# Patient Record
Sex: FEMALE | Race: WHITE | Employment: OTHER | URBAN - METROPOLITAN AREA
[De-identification: names, ages, dates, MRNs, and addresses within clinical notes are randomized per-mention and may not be internally consistent; named-entity substitution may affect disease eponyms.]

---

## 2017-03-23 ENCOUNTER — TELEPHONE (OUTPATIENT)
Dept: INTERNAL MEDICINE CLINIC | Facility: CLINIC | Age: 77
End: 2017-03-23

## 2017-03-23 NOTE — TELEPHONE ENCOUNTER
Actions Requested:   Requesting appointment. Situation/Background   Problem:  Sluggish and weak   Onset:   On and off all winter long. Worse since yesterday   Associated Symptoms:  Patient has not been able to sleep .   She wakes up in the morning sluggi

## 2017-03-23 NOTE — TELEPHONE ENCOUNTER
Pt states that her blood pressure is high. Per pt she is not feeling well. Transferred call to [employer].

## 2017-03-24 ENCOUNTER — OFFICE VISIT (OUTPATIENT)
Dept: INTERNAL MEDICINE CLINIC | Facility: CLINIC | Age: 77
End: 2017-03-24

## 2017-03-24 VITALS
HEART RATE: 65 BPM | BODY MASS INDEX: 24.05 KG/M2 | HEIGHT: 61 IN | SYSTOLIC BLOOD PRESSURE: 164 MMHG | DIASTOLIC BLOOD PRESSURE: 76 MMHG | RESPIRATION RATE: 18 BRPM | WEIGHT: 127.38 LBS

## 2017-03-24 DIAGNOSIS — R07.9 CHEST PAIN, UNSPECIFIED TYPE: ICD-10-CM

## 2017-03-24 DIAGNOSIS — G47.00 INSOMNIA, UNSPECIFIED TYPE: ICD-10-CM

## 2017-03-24 DIAGNOSIS — R53.83 MALAISE AND FATIGUE: ICD-10-CM

## 2017-03-24 DIAGNOSIS — I10 ESSENTIAL HYPERTENSION: Primary | ICD-10-CM

## 2017-03-24 DIAGNOSIS — R53.81 MALAISE AND FATIGUE: ICD-10-CM

## 2017-03-24 PROCEDURE — 99213 OFFICE O/P EST LOW 20 MIN: CPT | Performed by: INTERNAL MEDICINE

## 2017-03-24 PROCEDURE — G0463 HOSPITAL OUTPT CLINIC VISIT: HCPCS | Performed by: INTERNAL MEDICINE

## 2017-03-24 RX ORDER — LISINOPRIL AND HYDROCHLOROTHIAZIDE 25; 20 MG/1; MG/1
1 TABLET ORAL DAILY
Qty: 100 TABLET | Refills: 3 | Status: SHIPPED | OUTPATIENT
Start: 2017-03-24 | End: 2017-09-18

## 2017-03-24 RX ORDER — MIRTAZAPINE 7.5 MG/1
7.5 TABLET, FILM COATED ORAL NIGHTLY
Qty: 90 TABLET | Refills: 2 | Status: SHIPPED | OUTPATIENT
Start: 2017-03-24 | End: 2017-04-26

## 2017-03-24 NOTE — PROGRESS NOTES
Old aisha pt  Had stress echo nl 5 y ago  Has htn  On zetril 40mg,norvasc 5 lopressor 50mg  Bps high at home   03/24/17  1117   BP: 164/76   Pulse: 65   Resp: 18   Height: 5' 1\" (1.549 m)   Weight: 127 lb 6.4 oz (57.788 kg)         Body mass index is 24.08

## 2017-03-25 ENCOUNTER — TELEPHONE (OUTPATIENT)
Dept: INTERNAL MEDICINE CLINIC | Facility: CLINIC | Age: 77
End: 2017-03-25

## 2017-03-25 NOTE — TELEPHONE ENCOUNTER
Pharmacy called in stating they received an rx for Lisinopril-Hydrochlorothiazide 20-25, but have noted on file that pt has always taken lisinopril 40 mg?   Pharmacy wanting to clarify if pt should still be taking the lisinopril 40 mg along with this new rx

## 2017-03-25 NOTE — TELEPHONE ENCOUNTER
Per RF OV note from 3/24/17:    4. Chest pain, unspecified type    - Lipid Panel [E];  Future  - EKG 12 Lead to be performed at [facility]; Future  Change zestril to zestoretic 20/25 one/d        Pharmacy contacted and spoke to Camilla [place]

## 2017-03-27 ENCOUNTER — LAB ENCOUNTER (OUTPATIENT)
Dept: LAB | Age: 77
End: 2017-03-27
Attending: INTERNAL MEDICINE
Payer: MEDICARE

## 2017-03-27 ENCOUNTER — APPOINTMENT (OUTPATIENT)
Dept: LAB | Age: 77
End: 2017-03-27
Attending: INTERNAL MEDICINE
Payer: MEDICARE

## 2017-03-27 DIAGNOSIS — I10 ESSENTIAL HYPERTENSION: ICD-10-CM

## 2017-03-27 DIAGNOSIS — R07.9 CHEST PAIN, UNSPECIFIED TYPE: ICD-10-CM

## 2017-03-27 DIAGNOSIS — R53.83 MALAISE AND FATIGUE: ICD-10-CM

## 2017-03-27 DIAGNOSIS — R53.81 MALAISE AND FATIGUE: ICD-10-CM

## 2017-03-27 LAB
ALBUMIN SERPL BCP-MCNC: 3.7 G/DL (ref 3.5–4.8)
ALBUMIN/GLOB SERPL: 1.3 {RATIO} (ref 1–2)
ALP SERPL-CCNC: 63 U/L (ref 32–100)
ALT SERPL-CCNC: 22 U/L (ref 14–54)
ANION GAP SERPL CALC-SCNC: 9 MMOL/L (ref 0–18)
AST SERPL-CCNC: 21 U/L (ref 15–41)
BASOPHILS # BLD: 0.1 K/UL (ref 0–0.2)
BASOPHILS NFR BLD: 1 %
BILIRUB SERPL-MCNC: 0.8 MG/DL (ref 0.3–1.2)
BUN SERPL-MCNC: 11 MG/DL (ref 8–20)
BUN/CREAT SERPL: 12.1 (ref 10–20)
CALCIUM SERPL-MCNC: 9.3 MG/DL (ref 8.5–10.5)
CHLORIDE SERPL-SCNC: 103 MMOL/L (ref 95–110)
CHOLEST SERPL-MCNC: 178 MG/DL (ref 110–200)
CO2 SERPL-SCNC: 28 MMOL/L (ref 22–32)
CREAT SERPL-MCNC: 0.91 MG/DL (ref 0.5–1.5)
EOSINOPHIL # BLD: 0.3 K/UL (ref 0–0.7)
EOSINOPHIL NFR BLD: 5 %
ERYTHROCYTE [DISTWIDTH] IN BLOOD BY AUTOMATED COUNT: 14.2 % (ref 11–15)
GLOBULIN PLAS-MCNC: 2.9 G/DL (ref 2.5–3.7)
GLUCOSE SERPL-MCNC: 94 MG/DL (ref 70–99)
HCT VFR BLD AUTO: 39.7 % (ref 35–48)
HDLC SERPL-MCNC: 52 MG/DL
HGB BLD-MCNC: 13 G/DL (ref 12–16)
LDLC SERPL CALC-MCNC: 108 MG/DL (ref 0–99)
LYMPHOCYTES # BLD: 2.4 K/UL (ref 1–4)
LYMPHOCYTES NFR BLD: 37 %
MCH RBC QN AUTO: 28.3 PG (ref 27–32)
MCHC RBC AUTO-ENTMCNC: 32.7 G/DL (ref 32–37)
MCV RBC AUTO: 86.7 FL (ref 80–100)
MONOCYTES # BLD: 0.4 K/UL (ref 0–1)
MONOCYTES NFR BLD: 6 %
NEUTROPHILS # BLD AUTO: 3.3 K/UL (ref 1.8–7.7)
NEUTROPHILS NFR BLD: 51 %
NONHDLC SERPL-MCNC: 126 MG/DL
OSMOLALITY UR CALC.SUM OF ELEC: 289 MOSM/KG (ref 275–295)
PLATELET # BLD AUTO: 238 K/UL (ref 140–400)
PMV BLD AUTO: 9 FL (ref 7.4–10.3)
POTASSIUM SERPL-SCNC: 3.7 MMOL/L (ref 3.3–5.1)
PROT SERPL-MCNC: 6.6 G/DL (ref 5.9–8.4)
RBC # BLD AUTO: 4.58 M/UL (ref 3.7–5.4)
SODIUM SERPL-SCNC: 140 MMOL/L (ref 136–144)
TRIGL SERPL-MCNC: 88 MG/DL (ref 1–149)
TSH SERPL-ACNC: 1.7 UIU/ML (ref 0.34–5.6)
WBC # BLD AUTO: 6.4 K/UL (ref 4–11)

## 2017-03-27 PROCEDURE — 93005 ELECTROCARDIOGRAM TRACING: CPT

## 2017-03-27 PROCEDURE — 85025 COMPLETE CBC W/AUTO DIFF WBC: CPT

## 2017-03-27 PROCEDURE — 84443 ASSAY THYROID STIM HORMONE: CPT

## 2017-03-27 PROCEDURE — 93010 ELECTROCARDIOGRAM REPORT: CPT

## 2017-03-27 PROCEDURE — 36415 COLL VENOUS BLD VENIPUNCTURE: CPT

## 2017-03-27 PROCEDURE — 80053 COMPREHEN METABOLIC PANEL: CPT

## 2017-03-27 PROCEDURE — 80061 LIPID PANEL: CPT

## 2017-03-28 ENCOUNTER — TELEPHONE (OUTPATIENT)
Dept: INTERNAL MEDICINE CLINIC | Facility: CLINIC | Age: 77
End: 2017-03-28

## 2017-03-28 NOTE — TELEPHONE ENCOUNTER
----- Message from Corrine May MD sent at 3/27/2017  3:39 PM CDT -----  Call patient tell them results were normal

## 2017-03-28 NOTE — TELEPHONE ENCOUNTER
Spoke with patient (identified name and ), results reviewed and agrees with plan. Patient was very happy.

## 2017-04-19 ENCOUNTER — OFFICE VISIT (OUTPATIENT)
Dept: DERMATOLOGY CLINIC | Facility: CLINIC | Age: 77
End: 2017-04-19

## 2017-04-19 DIAGNOSIS — D23.5 BENIGN NEOPLASM OF SKIN OF TRUNK, EXCEPT SCROTUM: ICD-10-CM

## 2017-04-19 DIAGNOSIS — D48.5 NEOPLASM OF UNCERTAIN BEHAVIOR OF SKIN: Primary | ICD-10-CM

## 2017-04-19 DIAGNOSIS — D23.4 BENIGN NEOPLASM OF SCALP AND SKIN OF NECK: ICD-10-CM

## 2017-04-19 DIAGNOSIS — D23.70 BENIGN NEOPLASM OF SKIN OF LOWER LIMB, INCLUDING HIP, UNSPECIFIED LATERALITY: ICD-10-CM

## 2017-04-19 DIAGNOSIS — L82.1 SEBORRHEIC KERATOSES: ICD-10-CM

## 2017-04-19 DIAGNOSIS — D23.60 BENIGN NEOPLASM OF SKIN OF UPPER LIMB, INCLUDING SHOULDER, UNSPECIFIED LATERALITY: ICD-10-CM

## 2017-04-19 PROCEDURE — 11100 BIOPSY OF SKIN LESION: CPT | Performed by: DERMATOLOGY

## 2017-04-19 PROCEDURE — 99202 OFFICE O/P NEW SF 15 MIN: CPT | Performed by: DERMATOLOGY

## 2017-04-19 PROCEDURE — 11101 BIOPSY, EACH ADDED LESION: CPT | Performed by: DERMATOLOGY

## 2017-04-19 PROCEDURE — 88305 TISSUE EXAM BY PATHOLOGIST: CPT | Performed by: DERMATOLOGY

## 2017-04-19 RX ORDER — AMMONIUM LACTATE 12 G/100G
1 LOTION TOPICAL 2 TIMES DAILY
Qty: 500 G | Refills: 11 | Status: SHIPPED | OUTPATIENT
Start: 2017-04-19 | End: 2017-05-19

## 2017-04-19 NOTE — PROGRESS NOTES
Past Medical History   Diagnosis Date   • Lipid screening 6/25/14   • Onychia of toe of left foot 2009     Left great toe/Depbridement of at least 2/3 toenail   • Hypertension          Past Surgical History    APPENDECTOMY         Social History   Marital

## 2017-04-26 ENCOUNTER — OFFICE VISIT (OUTPATIENT)
Dept: INTERNAL MEDICINE CLINIC | Facility: CLINIC | Age: 77
End: 2017-04-26

## 2017-04-26 VITALS
WEIGHT: 129 LBS | BODY MASS INDEX: 24.35 KG/M2 | SYSTOLIC BLOOD PRESSURE: 125 MMHG | HEIGHT: 61 IN | HEART RATE: 56 BPM | DIASTOLIC BLOOD PRESSURE: 71 MMHG

## 2017-04-26 DIAGNOSIS — G47.00 INSOMNIA, UNSPECIFIED TYPE: ICD-10-CM

## 2017-04-26 DIAGNOSIS — R53.83 MALAISE AND FATIGUE: ICD-10-CM

## 2017-04-26 DIAGNOSIS — J34.89 NASAL LESION: ICD-10-CM

## 2017-04-26 DIAGNOSIS — I10 ESSENTIAL HYPERTENSION: Primary | ICD-10-CM

## 2017-04-26 DIAGNOSIS — R53.81 MALAISE AND FATIGUE: ICD-10-CM

## 2017-04-26 PROCEDURE — 99213 OFFICE O/P EST LOW 20 MIN: CPT | Performed by: INTERNAL MEDICINE

## 2017-04-26 PROCEDURE — G0463 HOSPITAL OUTPT CLINIC VISIT: HCPCS | Performed by: INTERNAL MEDICINE

## 2017-04-26 RX ORDER — ALPRAZOLAM 0.25 MG/1
TABLET ORAL
Qty: 30 TABLET | Refills: 1 | Status: SHIPPED | OUTPATIENT
Start: 2017-04-26 | End: 2020-12-14

## 2017-04-26 NOTE — PROGRESS NOTES
Still tired didn't get mirtazepinr dueto price  Had nasal lesion removed  beging  Results given to pt    Blood pressure 125/71, pulse 56, height 5' 1\" (1.549 m), weight 129 lb (58.514 kg).     HEENT-NC/AT PEERLA, eom intact, sclerae non icteric, oral exam

## 2017-04-26 NOTE — PROGRESS NOTES
Quick Note:    The pathology report from last visit showed benign inflamed keratosis at nose, Left cheek , sk . Please log in test results, send biopsy results letter.  Pt to rtc 1 year or prn.  ______

## 2017-05-08 NOTE — PROGRESS NOTES
Betito Alvarenga is a 68year old female. HPI:     CC:  Patient presents with:  Derm Problem: new patient. Pt here with concerns of multiple lesions all over body and is requesting a full body skin exam.  Pt denies personal and family hx of skin cancer. least 2/3 toenail   • Hypertension          Past Surgical History    APPENDECTOMY         Social History   Marital Status:    Spouse Name: N/A    Years of Education: N/A  Number of Children: N/A     Occupational History  None on file     Social Histo keratotic papules scattered, cherry-red vascular papules scattered. See map today's date for lesions noted . 10 m crusted plaque over nasal dorsum.   Erythematous scaling keratotic papules left upper lip    Dark brown black papule left lower cheek    Ot carolyn

## 2017-06-20 RX ORDER — METOPROLOL TARTRATE 50 MG/1
TABLET, FILM COATED ORAL
Qty: 90 TABLET | Refills: 0 | Status: SHIPPED | OUTPATIENT
Start: 2017-06-20 | End: 2017-09-15

## 2017-06-20 RX ORDER — AMLODIPINE BESYLATE 5 MG/1
TABLET ORAL
Qty: 90 TABLET | Refills: 0 | Status: SHIPPED | OUTPATIENT
Start: 2017-06-20 | End: 2017-09-15

## 2017-09-12 RX ORDER — METOPROLOL TARTRATE 50 MG/1
TABLET, FILM COATED ORAL
Qty: 90 TABLET | Refills: 0 | OUTPATIENT
Start: 2017-09-12

## 2017-09-12 RX ORDER — AMLODIPINE BESYLATE 5 MG/1
TABLET ORAL
Qty: 90 TABLET | Refills: 0 | OUTPATIENT
Start: 2017-09-12

## 2017-09-15 NOTE — TELEPHONE ENCOUNTER
Patient is calling regarding the status of the refills. Current Outpatient Prescriptions:  Lisinopril-Hydrochlorothiazide (ZESTORETIC) 20-25 MG Oral Tab Take 1 tablet by mouth daily.  Disp: 100 tablet Rfl: 3

## 2017-09-18 RX ORDER — LISINOPRIL AND HYDROCHLOROTHIAZIDE 25; 20 MG/1; MG/1
1 TABLET ORAL DAILY
Qty: 90 TABLET | Refills: 0 | Status: SHIPPED | OUTPATIENT
Start: 2017-09-18 | End: 2017-12-07

## 2017-09-18 RX ORDER — METOPROLOL TARTRATE 50 MG/1
TABLET, FILM COATED ORAL
Qty: 90 TABLET | Refills: 0 | Status: SHIPPED | OUTPATIENT
Start: 2017-09-18 | End: 2017-12-07

## 2017-09-18 RX ORDER — AMLODIPINE BESYLATE 5 MG/1
TABLET ORAL
Qty: 90 TABLET | Refills: 0 | Status: SHIPPED | OUTPATIENT
Start: 2017-09-18 | End: 2017-12-07

## 2017-09-18 NOTE — TELEPHONE ENCOUNTER
Patient contacted, will make f/u appt with Dr Kierra Martinez in Dec 2017  Medication refilled for 90 days per protocol.     Hypertensive Medications  Protocol Criteria:  · Appointment scheduled in the past 6 months or in the next 3 months  · BMP or CMP in the

## 2017-12-06 ENCOUNTER — TELEPHONE (OUTPATIENT)
Dept: INTERNAL MEDICINE CLINIC | Facility: CLINIC | Age: 77
End: 2017-12-06

## 2017-12-07 ENCOUNTER — OFFICE VISIT (OUTPATIENT)
Dept: INTERNAL MEDICINE CLINIC | Facility: CLINIC | Age: 77
End: 2017-12-07

## 2017-12-07 VITALS
HEART RATE: 66 BPM | DIASTOLIC BLOOD PRESSURE: 73 MMHG | HEIGHT: 61 IN | WEIGHT: 129 LBS | SYSTOLIC BLOOD PRESSURE: 133 MMHG | BODY MASS INDEX: 24.35 KG/M2 | TEMPERATURE: 97 F | RESPIRATION RATE: 18 BRPM

## 2017-12-07 DIAGNOSIS — E53.8 VITAMIN B12 DEFICIENCY: ICD-10-CM

## 2017-12-07 DIAGNOSIS — I10 ESSENTIAL HYPERTENSION: Primary | ICD-10-CM

## 2017-12-07 DIAGNOSIS — G25.81 RESTLESS LEGS: ICD-10-CM

## 2017-12-07 DIAGNOSIS — D64.9 ANEMIA, UNSPECIFIED TYPE: ICD-10-CM

## 2017-12-07 PROCEDURE — 99214 OFFICE O/P EST MOD 30 MIN: CPT | Performed by: INTERNAL MEDICINE

## 2017-12-07 PROCEDURE — G0463 HOSPITAL OUTPT CLINIC VISIT: HCPCS | Performed by: INTERNAL MEDICINE

## 2017-12-07 RX ORDER — METOPROLOL TARTRATE 50 MG/1
TABLET, FILM COATED ORAL
Qty: 90 TABLET | Refills: 1 | Status: SHIPPED | OUTPATIENT
Start: 2017-12-07 | End: 2018-06-11

## 2017-12-07 RX ORDER — AMLODIPINE BESYLATE 5 MG/1
TABLET ORAL
Qty: 90 TABLET | Refills: 1 | Status: SHIPPED | OUTPATIENT
Start: 2017-12-07 | End: 2018-06-11

## 2017-12-07 RX ORDER — LISINOPRIL AND HYDROCHLOROTHIAZIDE 25; 20 MG/1; MG/1
1 TABLET ORAL DAILY
Qty: 90 TABLET | Refills: 1 | Status: SHIPPED | OUTPATIENT
Start: 2017-12-07 | End: 2018-06-11

## 2017-12-07 NOTE — PROGRESS NOTES
HPI:    Patient ID: Henry Ragland is a 68year old female. Hypertension   This is a chronic (pt  state  feels   good  and  need  refills   ,  no  complains   except   sometimes restless legs ) problem. The current episode started more than 1 year ago.  The mouth daily.  Disp: 90 tablet Rfl: 1   AmLODIPine Besylate 5 MG Oral Tab TAKE ONE TABLET BY MOUTH ONCE DAILY Disp: 90 tablet Rfl: 1   ALPRAZolam 0.25 MG Oral Tab One at night as needed for sleep Disp: 30 tablet Rfl: 1   aspirin 81 MG Oral Tab Take 81 mg by temperature source Oral, resp. rate 18, height 5' 1\" (1.549 m), weight 129 lb (58.5 kg).            ASSESSMENT/PLAN:   Essential hypertension  (primary encounter diagnosis)  Take high blood pressure medication as perscribed   Low- sodium diet (2grams per d

## 2018-06-11 DIAGNOSIS — I10 ESSENTIAL HYPERTENSION: ICD-10-CM

## 2018-06-11 NOTE — TELEPHONE ENCOUNTER
Pending Prescriptions Disp Refills    Metoprolol Tartrate 50 MG Oral Tab 90 tablet 1     Sig: TAKE ONE TABLET BY MOUTH ONCE DAILY      Lisinopril-Hydrochlorothiazide (ZESTORETIC) 20-25 MG Oral Tab 90 tablet 1     Sig: Take 1 tablet by mouth daily.       A 03/27/2017   GLOBULIN 2.9 03/27/2017   AGRATIO 1.4 06/25/2015   ANIONGAP 9 03/27/2017   OSMOCALC 289 03/27/2017

## 2018-06-11 NOTE — TELEPHONE ENCOUNTER
Current Outpatient Prescriptions:   •  Metoprolol Tartrate 50 MG Oral Tab, TAKE ONE TABLET BY MOUTH ONCE DAILY, Disp: 90 tablet, Rfl: 1  •  Lisinopril-Hydrochlorothiazide (ZESTORETIC) 20-25 MG Oral Tab, Take 1 tablet by mouth daily. , Disp: 90 tablet, Rfl

## 2018-06-12 RX ORDER — LISINOPRIL AND HYDROCHLOROTHIAZIDE 25; 20 MG/1; MG/1
1 TABLET ORAL DAILY
Qty: 30 TABLET | Refills: 0 | Status: SHIPPED | OUTPATIENT
Start: 2018-06-12 | End: 2018-07-26

## 2018-06-12 RX ORDER — METOPROLOL TARTRATE 50 MG/1
TABLET, FILM COATED ORAL
Qty: 90 TABLET | Refills: 0 | Status: SHIPPED | OUTPATIENT
Start: 2018-06-12 | End: 2018-07-26

## 2018-06-12 RX ORDER — AMLODIPINE BESYLATE 5 MG/1
TABLET ORAL
Qty: 90 TABLET | Refills: 0 | Status: SHIPPED | OUTPATIENT
Start: 2018-06-12 | End: 2018-07-26

## 2018-07-26 ENCOUNTER — OFFICE VISIT (OUTPATIENT)
Dept: INTERNAL MEDICINE CLINIC | Facility: CLINIC | Age: 78
End: 2018-07-26
Payer: MEDICARE

## 2018-07-26 VITALS
DIASTOLIC BLOOD PRESSURE: 68 MMHG | HEART RATE: 61 BPM | SYSTOLIC BLOOD PRESSURE: 122 MMHG | WEIGHT: 123.81 LBS | RESPIRATION RATE: 18 BRPM | TEMPERATURE: 98 F | BODY MASS INDEX: 23.38 KG/M2 | HEIGHT: 61 IN

## 2018-07-26 DIAGNOSIS — Z23 NEED FOR VACCINATION: ICD-10-CM

## 2018-07-26 DIAGNOSIS — I10 ESSENTIAL HYPERTENSION: ICD-10-CM

## 2018-07-26 DIAGNOSIS — G25.81 RESTLESS LEGS SYNDROME: Primary | ICD-10-CM

## 2018-07-26 DIAGNOSIS — N81.10 BLADDER PROLAPSE, FEMALE, ACQUIRED: ICD-10-CM

## 2018-07-26 PROCEDURE — 99214 OFFICE O/P EST MOD 30 MIN: CPT | Performed by: INTERNAL MEDICINE

## 2018-07-26 PROCEDURE — G0463 HOSPITAL OUTPT CLINIC VISIT: HCPCS | Performed by: INTERNAL MEDICINE

## 2018-07-26 PROCEDURE — 90670 PCV13 VACCINE IM: CPT | Performed by: INTERNAL MEDICINE

## 2018-07-26 PROCEDURE — G0009 ADMIN PNEUMOCOCCAL VACCINE: HCPCS | Performed by: INTERNAL MEDICINE

## 2018-07-26 RX ORDER — METOPROLOL TARTRATE 50 MG/1
TABLET, FILM COATED ORAL
Qty: 90 TABLET | Refills: 1 | Status: SHIPPED | OUTPATIENT
Start: 2018-07-26 | End: 2019-01-28 | Stop reason: DRUGHIGH

## 2018-07-26 RX ORDER — AMLODIPINE BESYLATE 5 MG/1
TABLET ORAL
Qty: 90 TABLET | Refills: 1 | Status: SHIPPED | OUTPATIENT
Start: 2018-07-26 | End: 2019-01-07

## 2018-07-26 RX ORDER — LISINOPRIL AND HYDROCHLOROTHIAZIDE 25; 20 MG/1; MG/1
1 TABLET ORAL DAILY
Qty: 90 TABLET | Refills: 1 | Status: SHIPPED | OUTPATIENT
Start: 2018-07-26 | End: 2019-01-07

## 2018-07-26 NOTE — PROGRESS NOTES
HPI:    Patient ID: Federica Fine is a 66year old female. Patient in office today for follow up  bp check visit and exam. States feeling well. Denies chest pain, shortnesss of breath, palpitations, or abdominal pain, denies Uti symptoms fever or chills.   Dandy Samples nervous/anxious. Current Outpatient Prescriptions:  Metoprolol Tartrate 50 MG Oral Tab TAKE ONE TABLET BY MOUTH ONCE DAILY Disp: 90 tablet Rfl: 1   Lisinopril-Hydrochlorothiazide (ZESTORETIC) 20-25 MG Oral Tab Take 1 tablet by mouth daily.  Vinny Montague time.   Skin: Skin is warm and dry. Psychiatric: She has a normal mood and affect. Her behavior is normal.   Nursing note and vitals reviewed. Blood pressure 122/68, pulse 61, temperature 97.9 °F (36.6 °C), temperature source Oral, resp.  rate 18, heig

## 2018-07-27 ENCOUNTER — LAB ENCOUNTER (OUTPATIENT)
Dept: LAB | Age: 78
End: 2018-07-27
Attending: INTERNAL MEDICINE
Payer: MEDICARE

## 2018-07-27 DIAGNOSIS — I10 ESSENTIAL HYPERTENSION: ICD-10-CM

## 2018-07-27 DIAGNOSIS — G25.81 RESTLESS LEGS: ICD-10-CM

## 2018-07-27 DIAGNOSIS — E53.8 VITAMIN B12 DEFICIENCY: ICD-10-CM

## 2018-07-27 DIAGNOSIS — D64.9 ANEMIA, UNSPECIFIED TYPE: ICD-10-CM

## 2018-07-27 LAB
ALBUMIN SERPL BCP-MCNC: 3.6 G/DL (ref 3.5–4.8)
ALBUMIN/GLOB SERPL: 1.3 {RATIO} (ref 1–2)
ALP SERPL-CCNC: 61 U/L (ref 32–100)
ALT SERPL-CCNC: 22 U/L (ref 14–54)
ANION GAP SERPL CALC-SCNC: 10 MMOL/L (ref 0–18)
AST SERPL-CCNC: 25 U/L (ref 15–41)
BASOPHILS # BLD: 0.1 K/UL (ref 0–0.2)
BASOPHILS NFR BLD: 1 %
BILIRUB SERPL-MCNC: 0.7 MG/DL (ref 0.3–1.2)
BUN SERPL-MCNC: 28 MG/DL (ref 8–20)
BUN/CREAT SERPL: 28.6 (ref 10–20)
CALCIUM SERPL-MCNC: 9.1 MG/DL (ref 8.5–10.5)
CHLORIDE SERPL-SCNC: 103 MMOL/L (ref 95–110)
CHOLEST SERPL-MCNC: 162 MG/DL (ref 110–200)
CO2 SERPL-SCNC: 26 MMOL/L (ref 22–32)
CREAT SERPL-MCNC: 0.98 MG/DL (ref 0.5–1.5)
EOSINOPHIL # BLD: 0.6 K/UL (ref 0–0.7)
EOSINOPHIL NFR BLD: 8 %
ERYTHROCYTE [DISTWIDTH] IN BLOOD BY AUTOMATED COUNT: 13.7 % (ref 11–15)
FERRITIN SERPL IA-MCNC: 63 NG/ML (ref 11–307)
GLOBULIN PLAS-MCNC: 2.7 G/DL (ref 2.5–3.7)
GLUCOSE SERPL-MCNC: 88 MG/DL (ref 70–99)
HCT VFR BLD AUTO: 34.5 % (ref 35–48)
HDLC SERPL-MCNC: 51 MG/DL
HGB BLD-MCNC: 11.5 G/DL (ref 12–16)
LDLC SERPL CALC-MCNC: 99 MG/DL (ref 0–99)
LYMPHOCYTES # BLD: 2.5 K/UL (ref 1–4)
LYMPHOCYTES NFR BLD: 33 %
MCH RBC QN AUTO: 29.1 PG (ref 27–32)
MCHC RBC AUTO-ENTMCNC: 33.5 G/DL (ref 32–37)
MCV RBC AUTO: 87 FL (ref 80–100)
MONOCYTES # BLD: 0.6 K/UL (ref 0–1)
MONOCYTES NFR BLD: 8 %
NEUTROPHILS # BLD AUTO: 3.9 K/UL (ref 1.8–7.7)
NEUTROPHILS NFR BLD: 50 %
NONHDLC SERPL-MCNC: 111 MG/DL
OSMOLALITY UR CALC.SUM OF ELEC: 293 MOSM/KG (ref 275–295)
PATIENT FASTING: YES
PLATELET # BLD AUTO: 262 K/UL (ref 140–400)
PMV BLD AUTO: 9.1 FL (ref 7.4–10.3)
POTASSIUM SERPL-SCNC: 3.5 MMOL/L (ref 3.3–5.1)
PROT SERPL-MCNC: 6.3 G/DL (ref 5.9–8.4)
RBC # BLD AUTO: 3.96 M/UL (ref 3.7–5.4)
SODIUM SERPL-SCNC: 139 MMOL/L (ref 136–144)
TRIGL SERPL-MCNC: 62 MG/DL (ref 1–149)
TSH SERPL-ACNC: 1.64 UIU/ML (ref 0.45–5.33)
VIT B12 SERPL-MCNC: 526 PG/ML (ref 181–914)
WBC # BLD AUTO: 7.7 K/UL (ref 4–11)

## 2018-07-27 PROCEDURE — 85025 COMPLETE CBC W/AUTO DIFF WBC: CPT

## 2018-07-27 PROCEDURE — 80053 COMPREHEN METABOLIC PANEL: CPT

## 2018-07-27 PROCEDURE — 82728 ASSAY OF FERRITIN: CPT

## 2018-07-27 PROCEDURE — 80061 LIPID PANEL: CPT

## 2018-07-27 PROCEDURE — 84443 ASSAY THYROID STIM HORMONE: CPT

## 2018-07-27 PROCEDURE — 82607 VITAMIN B-12: CPT

## 2018-07-27 PROCEDURE — 36415 COLL VENOUS BLD VENIPUNCTURE: CPT

## 2018-07-28 ENCOUNTER — APPOINTMENT (OUTPATIENT)
Dept: LAB | Age: 78
End: 2018-07-28
Attending: INTERNAL MEDICINE
Payer: MEDICARE

## 2018-07-28 DIAGNOSIS — I10 ESSENTIAL HYPERTENSION: ICD-10-CM

## 2018-07-28 LAB
BACTERIA UR QL AUTO: NEGATIVE /HPF
RBC #/AREA URNS AUTO: 0 /HPF
WBC #/AREA URNS AUTO: 1 /HPF

## 2018-07-28 PROCEDURE — 81015 MICROSCOPIC EXAM OF URINE: CPT

## 2018-08-01 ENCOUNTER — TELEPHONE (OUTPATIENT)
Dept: OTHER | Age: 78
End: 2018-08-01

## 2018-08-01 NOTE — TELEPHONE ENCOUNTER
Advised patient on Dr. Damir Malagon information and recommendations. Patient verbalized understanding. Scheduled for follow-up 8/16/18 at 1 pm. Advised to call back if needed.       Notes recorded by Jamila Lowery MD on 7/30/2018 at 8:07 AM AMANDAT  Joshua

## 2018-08-16 ENCOUNTER — OFFICE VISIT (OUTPATIENT)
Dept: INTERNAL MEDICINE CLINIC | Facility: CLINIC | Age: 78
End: 2018-08-16
Payer: MEDICARE

## 2018-08-16 VITALS
HEART RATE: 59 BPM | RESPIRATION RATE: 18 BRPM | BODY MASS INDEX: 23.64 KG/M2 | TEMPERATURE: 98 F | DIASTOLIC BLOOD PRESSURE: 71 MMHG | HEIGHT: 61 IN | WEIGHT: 125.19 LBS | SYSTOLIC BLOOD PRESSURE: 133 MMHG

## 2018-08-16 DIAGNOSIS — E55.9 VITAMIN D DEFICIENCY: ICD-10-CM

## 2018-08-16 DIAGNOSIS — I10 ESSENTIAL HYPERTENSION: Primary | ICD-10-CM

## 2018-08-16 DIAGNOSIS — D64.9 ANEMIA, UNSPECIFIED TYPE: ICD-10-CM

## 2018-08-16 PROCEDURE — G0463 HOSPITAL OUTPT CLINIC VISIT: HCPCS | Performed by: INTERNAL MEDICINE

## 2018-08-16 PROCEDURE — 99214 OFFICE O/P EST MOD 30 MIN: CPT | Performed by: INTERNAL MEDICINE

## 2018-08-16 NOTE — PROGRESS NOTES
HPI:    Patient ID: Jayden Kenney is a 66year old female. Hypertension   This is a chronic problem. The current episode started more than 1 month ago. The problem has been gradually improving since onset. The problem is controlled.  Associated symptoms in with  a  lots  of garden  work  -otherwise  feels good -normal ). Negative for joint swelling, myalgias and neck pain. Skin: Negative for pallor and rash. Neurological: Negative for dizziness, weakness and headaches.    Psychiatric/Behavioral: Positive wheezes. She has no rales. Abdominal: Soft. She exhibits no mass. There is no hepatosplenomegaly. There is no tenderness. There is no CVA tenderness. Musculoskeletal: She exhibits no edema. Lymphadenopathy:     She has no cervical adenopathy.    Neuro prescriptions requested or ordered in this encounter    Imaging & Referrals:  None       #[id number]

## 2018-09-28 ENCOUNTER — LAB ENCOUNTER (OUTPATIENT)
Dept: LAB | Age: 78
End: 2018-09-28
Attending: INTERNAL MEDICINE
Payer: MEDICARE

## 2018-09-28 DIAGNOSIS — I10 ESSENTIAL HYPERTENSION: ICD-10-CM

## 2018-09-28 DIAGNOSIS — D64.9 ANEMIA, UNSPECIFIED TYPE: ICD-10-CM

## 2018-09-28 DIAGNOSIS — E55.9 VITAMIN D DEFICIENCY: ICD-10-CM

## 2018-09-28 LAB
ALBUMIN SERPL BCP-MCNC: 3.9 G/DL (ref 3.5–4.8)
ALBUMIN/GLOB SERPL: 1.4 {RATIO} (ref 1–2)
ALP SERPL-CCNC: 57 U/L (ref 32–100)
ALT SERPL-CCNC: 21 U/L (ref 14–54)
ANION GAP SERPL CALC-SCNC: 8 MMOL/L (ref 0–18)
AST SERPL-CCNC: 26 U/L (ref 15–41)
BASOPHILS # BLD: 0.1 K/UL (ref 0–0.2)
BASOPHILS NFR BLD: 1 %
BILIRUB SERPL-MCNC: 0.8 MG/DL (ref 0.3–1.2)
BUN SERPL-MCNC: 13 MG/DL (ref 8–20)
BUN/CREAT SERPL: 14 (ref 10–20)
CALCIUM SERPL-MCNC: 9.3 MG/DL (ref 8.5–10.5)
CHLORIDE SERPL-SCNC: 102 MMOL/L (ref 95–110)
CO2 SERPL-SCNC: 29 MMOL/L (ref 22–32)
CREAT SERPL-MCNC: 0.93 MG/DL (ref 0.5–1.5)
EOSINOPHIL # BLD: 0.2 K/UL (ref 0–0.7)
EOSINOPHIL NFR BLD: 3 %
ERYTHROCYTE [DISTWIDTH] IN BLOOD BY AUTOMATED COUNT: 14.1 % (ref 11–15)
FERRITIN SERPL IA-MCNC: 54 NG/ML (ref 11–307)
FOLATE SERPL-MCNC: >23.9 NG/ML
GLOBULIN PLAS-MCNC: 2.8 G/DL (ref 2.5–3.7)
GLUCOSE SERPL-MCNC: 98 MG/DL (ref 70–99)
HCT VFR BLD AUTO: 38.2 % (ref 35–48)
HGB BLD-MCNC: 12.5 G/DL (ref 12–16)
LYMPHOCYTES # BLD: 3 K/UL (ref 1–4)
LYMPHOCYTES NFR BLD: 38 %
MCH RBC QN AUTO: 28.9 PG (ref 27–32)
MCHC RBC AUTO-ENTMCNC: 32.8 G/DL (ref 32–37)
MCV RBC AUTO: 87.9 FL (ref 80–100)
MONOCYTES # BLD: 0.5 K/UL (ref 0–1)
MONOCYTES NFR BLD: 7 %
NEUTROPHILS # BLD AUTO: 4 K/UL (ref 1.8–7.7)
NEUTROPHILS NFR BLD: 51 %
OSMOLALITY UR CALC.SUM OF ELEC: 288 MOSM/KG (ref 275–295)
PATIENT FASTING: YES
PLATELET # BLD AUTO: 299 K/UL (ref 140–400)
PMV BLD AUTO: 8.7 FL (ref 7.4–10.3)
POTASSIUM SERPL-SCNC: 3.6 MMOL/L (ref 3.3–5.1)
PROT SERPL-MCNC: 6.7 G/DL (ref 5.9–8.4)
RBC # BLD AUTO: 4.35 M/UL (ref 3.7–5.4)
SODIUM SERPL-SCNC: 139 MMOL/L (ref 136–144)
VIT B12 SERPL-MCNC: 505 PG/ML (ref 181–914)
WBC # BLD AUTO: 7.9 K/UL (ref 4–11)

## 2018-09-28 PROCEDURE — 82728 ASSAY OF FERRITIN: CPT

## 2018-09-28 PROCEDURE — 82746 ASSAY OF FOLIC ACID SERUM: CPT

## 2018-09-28 PROCEDURE — 82306 VITAMIN D 25 HYDROXY: CPT

## 2018-09-28 PROCEDURE — 36415 COLL VENOUS BLD VENIPUNCTURE: CPT

## 2018-09-28 PROCEDURE — 85025 COMPLETE CBC W/AUTO DIFF WBC: CPT

## 2018-09-28 PROCEDURE — 82607 VITAMIN B-12: CPT

## 2018-09-28 PROCEDURE — 80053 COMPREHEN METABOLIC PANEL: CPT

## 2018-11-08 ENCOUNTER — OFFICE VISIT (OUTPATIENT)
Dept: INTERNAL MEDICINE CLINIC | Facility: CLINIC | Age: 78
End: 2018-11-08
Payer: MEDICARE

## 2018-11-08 VITALS
TEMPERATURE: 98 F | SYSTOLIC BLOOD PRESSURE: 124 MMHG | HEART RATE: 60 BPM | DIASTOLIC BLOOD PRESSURE: 72 MMHG | RESPIRATION RATE: 18 BRPM | WEIGHT: 126.38 LBS | HEIGHT: 61 IN | BODY MASS INDEX: 23.86 KG/M2

## 2018-11-08 DIAGNOSIS — Z23 INFLUENZA VACCINATION GIVEN: ICD-10-CM

## 2018-11-08 DIAGNOSIS — I10 ESSENTIAL HYPERTENSION: Primary | ICD-10-CM

## 2018-11-08 PROCEDURE — 99214 OFFICE O/P EST MOD 30 MIN: CPT | Performed by: INTERNAL MEDICINE

## 2018-11-08 PROCEDURE — G0463 HOSPITAL OUTPT CLINIC VISIT: HCPCS | Performed by: INTERNAL MEDICINE

## 2018-11-08 PROCEDURE — 90653 IIV ADJUVANT VACCINE IM: CPT | Performed by: INTERNAL MEDICINE

## 2018-11-08 PROCEDURE — G0008 ADMIN INFLUENZA VIRUS VAC: HCPCS | Performed by: INTERNAL MEDICINE

## 2018-11-08 NOTE — PROGRESS NOTES
HPI:    Patient ID: Betito Alvarenga is a 66year old female. Patient presents with:  Hypertension: Pt is f/u with her blood pressure  Imm/Inj: Flu shot      Hypertension   This is a chronic problem. The current episode started more than 1 year ago.  The proble MG Oral Tab Take 1 tablet by mouth daily.  Disp: 90 tablet Rfl: 1   AmLODIPine Besylate 5 MG Oral Tab TAKE ONE TABLET BY MOUTH ONCE DAILY Disp: 90 tablet Rfl: 1   ALPRAZolam 0.25 MG Oral Tab One at night as needed for sleep Disp: 30 tablet Rfl: 1   aspirin 18, height 5' 1\" (1.549 m), weight 126 lb 6.4 oz (57.3 kg).              ASSESSMENT/PLAN:   Essential hypertension  (primary encounter diagnosis)  Influenza vaccination given  Take high blood pressure medication as perscribed   Low- sodium diet (2grams per

## 2018-12-13 ENCOUNTER — TELEPHONE (OUTPATIENT)
Dept: OTHER | Age: 78
End: 2018-12-13

## 2018-12-13 NOTE — TELEPHONE ENCOUNTER
Pt calling to update Dr. Ladonna Kc regarding her BP readings. LOV 11/8/18. Pt takes medication daily in the morning, then takes her BP.     Pt's last bp readings:     /57   P 34 (Pt believes her machine was not working well, so she bought a new mach

## 2018-12-14 NOTE — TELEPHONE ENCOUNTER
Informed Dr. Giancarlo Velazquez instructions. Verbalized understanding.  No further questions and or concerns

## 2018-12-14 NOTE — TELEPHONE ENCOUNTER
Those are good blood pressure readings -but   only if heart rate and the blood pressure is decreased - bp systolic <683 blood pressure and heart rate less than 55  -  patient can cut down the amlodipine -to  half of the pill = 2.5  Mg

## 2019-01-07 ENCOUNTER — OFFICE VISIT (OUTPATIENT)
Dept: INTERNAL MEDICINE CLINIC | Facility: CLINIC | Age: 79
End: 2019-01-07
Payer: MEDICARE

## 2019-01-07 VITALS
WEIGHT: 128 LBS | TEMPERATURE: 98 F | HEART RATE: 52 BPM | RESPIRATION RATE: 18 BRPM | SYSTOLIC BLOOD PRESSURE: 127 MMHG | DIASTOLIC BLOOD PRESSURE: 60 MMHG | BODY MASS INDEX: 24.17 KG/M2 | HEIGHT: 61 IN

## 2019-01-07 DIAGNOSIS — I10 ESSENTIAL HYPERTENSION: ICD-10-CM

## 2019-01-07 PROCEDURE — 99214 OFFICE O/P EST MOD 30 MIN: CPT | Performed by: INTERNAL MEDICINE

## 2019-01-07 PROCEDURE — G0463 HOSPITAL OUTPT CLINIC VISIT: HCPCS | Performed by: INTERNAL MEDICINE

## 2019-01-07 RX ORDER — LISINOPRIL AND HYDROCHLOROTHIAZIDE 25; 20 MG/1; MG/1
1 TABLET ORAL DAILY
Qty: 90 TABLET | Refills: 1 | Status: SHIPPED | OUTPATIENT
Start: 2019-01-07 | End: 2019-07-22

## 2019-01-07 RX ORDER — ROPINIROLE 0.25 MG/1
0.25 TABLET, FILM COATED ORAL NIGHTLY
Qty: 30 TABLET | Refills: 0 | Status: SHIPPED | OUTPATIENT
Start: 2019-01-07 | End: 2019-02-05

## 2019-01-07 RX ORDER — AMLODIPINE BESYLATE 5 MG/1
TABLET ORAL
Qty: 90 TABLET | Refills: 1 | Status: SHIPPED | OUTPATIENT
Start: 2019-01-07 | End: 2019-08-27

## 2019-01-07 RX ORDER — METOPROLOL TARTRATE 50 MG/1
TABLET, FILM COATED ORAL
Qty: 90 TABLET | Refills: 1 | Status: CANCELLED | OUTPATIENT
Start: 2019-01-07

## 2019-01-07 NOTE — PROGRESS NOTES
HPI:    Patient ID: Kendall Nichols is a 66year old female. Patient presents with:  Hypertension: Pt is f/u with her blood presure  Leg Pain: Pt state that she has restless legs at night and would like to get some compression sock from the pharmacy.        HT metoprolol Tartrate 25 MG Oral Tab Take 1 tablet (25 mg total) by mouth daily. Disp: 90 tablet Rfl: 0   ROPINIRole HCl 0.25 MG Oral Tab Take 1 tablet (0.25 mg total) by mouth nightly.  2-3  Hr before  Bad time Disp: 30 tablet Rfl: 0   Metoprolol Tartrate pressure 127/60, pulse 52, temperature 98 °F (36.7 °C), temperature source Oral, resp. rate 18, height 5' 1\" (1.549 m), weight 128 lb (58.1 kg).              ASSESSMENT/PLAN:   Essential hypertension  Take high blood pressure medication as perscribed   Low

## 2019-01-28 ENCOUNTER — OFFICE VISIT (OUTPATIENT)
Dept: INTERNAL MEDICINE CLINIC | Facility: CLINIC | Age: 79
End: 2019-01-28
Payer: MEDICARE

## 2019-01-28 VITALS
SYSTOLIC BLOOD PRESSURE: 130 MMHG | RESPIRATION RATE: 18 BRPM | BODY MASS INDEX: 24.73 KG/M2 | DIASTOLIC BLOOD PRESSURE: 61 MMHG | HEART RATE: 98 BPM | TEMPERATURE: 98 F | HEIGHT: 61 IN | WEIGHT: 131 LBS

## 2019-01-28 DIAGNOSIS — R00.2 HEART PALPITATIONS: Primary | ICD-10-CM

## 2019-01-28 DIAGNOSIS — I10 ESSENTIAL HYPERTENSION: ICD-10-CM

## 2019-01-28 PROCEDURE — 99214 OFFICE O/P EST MOD 30 MIN: CPT | Performed by: INTERNAL MEDICINE

## 2019-01-28 PROCEDURE — 93005 ELECTROCARDIOGRAM TRACING: CPT | Performed by: INTERNAL MEDICINE

## 2019-01-28 PROCEDURE — 93010 ELECTROCARDIOGRAM REPORT: CPT | Performed by: INTERNAL MEDICINE

## 2019-01-28 PROCEDURE — G0463 HOSPITAL OUTPT CLINIC VISIT: HCPCS | Performed by: INTERNAL MEDICINE

## 2019-01-28 RX ORDER — AMLODIPINE BESYLATE 5 MG/1
TABLET ORAL
Qty: 90 TABLET | Refills: 1 | Status: CANCELLED | OUTPATIENT
Start: 2019-01-28

## 2019-01-28 RX ORDER — LISINOPRIL AND HYDROCHLOROTHIAZIDE 25; 20 MG/1; MG/1
1 TABLET ORAL DAILY
Qty: 90 TABLET | Refills: 1 | Status: CANCELLED | OUTPATIENT
Start: 2019-01-28

## 2019-01-28 NOTE — PROGRESS NOTES
HPI:    Patient ID: Sarath Bird is a 78year old female. Patient presents with:  Hypertension: Pt state that she is f/u after medication adjustment  Medication Request: Pend for refill  Patient in office today for  bp  follow up visit.  States feeling well 1   Lisinopril-Hydrochlorothiazide (ZESTORETIC) 20-25 MG Oral Tab Take 1 tablet by mouth daily. Disp: 90 tablet Rfl: 1   metoprolol Tartrate 25 MG Oral Tab Take 1 tablet (25 mg total) by mouth daily.  Disp: 90 tablet Rfl: 0   ROPINIRole HCl 0.25 MG Oral Tab Psychiatric: Her speech is normal and behavior is normal. Judgment and thought content normal. Her mood appears anxious. Cognition and memory are normal.   Nursing note and vitals reviewed.            Blood pressure 130/61, pulse 98, temperature 98.1 °F (

## 2019-02-05 RX ORDER — ROPINIROLE 0.25 MG/1
TABLET, FILM COATED ORAL
Qty: 30 TABLET | Refills: 0 | Status: SHIPPED | OUTPATIENT
Start: 2019-02-05 | End: 2019-03-07

## 2019-02-14 ENCOUNTER — MED REC SCAN ONLY (OUTPATIENT)
Dept: INTERNAL MEDICINE CLINIC | Facility: CLINIC | Age: 79
End: 2019-02-14

## 2019-02-27 ENCOUNTER — LAB ENCOUNTER (OUTPATIENT)
Dept: LAB | Age: 79
End: 2019-02-27
Attending: INTERNAL MEDICINE
Payer: MEDICARE

## 2019-02-27 DIAGNOSIS — I10 ESSENTIAL HYPERTENSION: ICD-10-CM

## 2019-02-27 LAB
ALBUMIN SERPL-MCNC: 3.6 G/DL (ref 3.4–5)
ALBUMIN/GLOB SERPL: 1.1 {RATIO} (ref 1–2)
ALP LIVER SERPL-CCNC: 66 U/L (ref 55–142)
ALT SERPL-CCNC: 25 U/L (ref 13–56)
ANION GAP SERPL CALC-SCNC: 8 MMOL/L (ref 0–18)
AST SERPL-CCNC: 19 U/L (ref 15–37)
BASOPHILS # BLD AUTO: 0.11 X10(3) UL (ref 0–0.2)
BASOPHILS NFR BLD AUTO: 1.4 %
BILIRUB SERPL-MCNC: 0.7 MG/DL (ref 0.1–2)
BUN BLD-MCNC: 26 MG/DL (ref 7–18)
BUN/CREAT SERPL: 25.7 (ref 10–20)
CALCIUM BLD-MCNC: 8.9 MG/DL (ref 8.5–10.1)
CHLORIDE SERPL-SCNC: 105 MMOL/L (ref 98–107)
CO2 SERPL-SCNC: 27 MMOL/L (ref 21–32)
CREAT BLD-MCNC: 1.01 MG/DL (ref 0.55–1.02)
DEPRECATED RDW RBC AUTO: 41.9 FL (ref 35.1–46.3)
EOSINOPHIL # BLD AUTO: 0.54 X10(3) UL (ref 0–0.7)
EOSINOPHIL NFR BLD AUTO: 6.8 %
ERYTHROCYTE [DISTWIDTH] IN BLOOD BY AUTOMATED COUNT: 13.2 % (ref 11–15)
GLOBULIN PLAS-MCNC: 3.4 G/DL (ref 2.8–4.4)
GLUCOSE BLD-MCNC: 97 MG/DL (ref 70–99)
HCT VFR BLD AUTO: 38.7 % (ref 35–48)
HGB BLD-MCNC: 12.9 G/DL (ref 12–16)
IMM GRANULOCYTES # BLD AUTO: 0.02 X10(3) UL (ref 0–1)
IMM GRANULOCYTES NFR BLD: 0.3 %
LIPASE SERPL-CCNC: 235 U/L (ref 73–393)
LYMPHOCYTES # BLD AUTO: 2.86 X10(3) UL (ref 1–4)
LYMPHOCYTES NFR BLD AUTO: 36 %
M PROTEIN MFR SERPL ELPH: 7 G/DL (ref 6.4–8.2)
MCH RBC QN AUTO: 29.2 PG (ref 26–34)
MCHC RBC AUTO-ENTMCNC: 33.3 G/DL (ref 31–37)
MCV RBC AUTO: 87.6 FL (ref 80–100)
MONOCYTES # BLD AUTO: 0.59 X10(3) UL (ref 0.1–1)
MONOCYTES NFR BLD AUTO: 7.4 %
NEUTROPHILS # BLD AUTO: 3.82 X10 (3) UL (ref 1.5–7.7)
NEUTROPHILS # BLD AUTO: 3.82 X10(3) UL (ref 1.5–7.7)
NEUTROPHILS NFR BLD AUTO: 48.1 %
OSMOLALITY SERPL CALC.SUM OF ELEC: 295 MOSM/KG (ref 275–295)
PLATELET # BLD AUTO: 266 10(3)UL (ref 150–450)
POTASSIUM SERPL-SCNC: 3.6 MMOL/L (ref 3.5–5.1)
RBC # BLD AUTO: 4.42 X10(6)UL (ref 3.8–5.3)
SODIUM SERPL-SCNC: 140 MMOL/L (ref 136–145)
TSI SER-ACNC: 1.97 MIU/ML (ref 0.36–3.74)
WBC # BLD AUTO: 7.9 X10(3) UL (ref 4–11)

## 2019-02-27 PROCEDURE — 80053 COMPREHEN METABOLIC PANEL: CPT

## 2019-02-27 PROCEDURE — 83690 ASSAY OF LIPASE: CPT

## 2019-02-27 PROCEDURE — 84443 ASSAY THYROID STIM HORMONE: CPT

## 2019-02-27 PROCEDURE — 36415 COLL VENOUS BLD VENIPUNCTURE: CPT

## 2019-02-27 PROCEDURE — 85025 COMPLETE CBC W/AUTO DIFF WBC: CPT

## 2019-02-28 ENCOUNTER — APPOINTMENT (OUTPATIENT)
Dept: LAB | Age: 79
End: 2019-02-28
Attending: INTERNAL MEDICINE
Payer: MEDICARE

## 2019-02-28 DIAGNOSIS — I10 ESSENTIAL HYPERTENSION: ICD-10-CM

## 2019-02-28 LAB
BILIRUB UR QL: NEGATIVE
CLARITY UR: CLEAR
COLOR UR: YELLOW
GLUCOSE UR-MCNC: NEGATIVE MG/DL
HGB UR QL STRIP.AUTO: NEGATIVE
HYALINE CASTS #/AREA URNS AUTO: 1 /LPF
KETONES UR-MCNC: NEGATIVE MG/DL
NITRITE UR QL STRIP.AUTO: NEGATIVE
PH UR: 6 [PH] (ref 5–8)
PROT UR-MCNC: NEGATIVE MG/DL
RBC #/AREA URNS AUTO: 1 /HPF
SP GR UR STRIP: 1.01 (ref 1–1.03)
UROBILINOGEN UR STRIP-ACNC: <2
VIT C UR-MCNC: 40 MG/DL
WBC #/AREA URNS AUTO: 2 /HPF

## 2019-02-28 PROCEDURE — 81001 URINALYSIS AUTO W/SCOPE: CPT

## 2019-03-04 NOTE — PROGRESS NOTES
Please call patient with blood test results. Kidney mildly decreased   Avoid ibuprofen Advil Aleve -pain medication due to fact can damage kidney function  Increase water intake    and liver function are normal, no anemia.      Lipase pancreatic enzyme i

## 2019-03-07 RX ORDER — ROPINIROLE 0.25 MG/1
TABLET, FILM COATED ORAL
Qty: 90 TABLET | Refills: 0 | Status: SHIPPED | OUTPATIENT
Start: 2019-03-07 | End: 2019-05-29

## 2019-03-07 NOTE — TELEPHONE ENCOUNTER
Pt called in to follow up on request. She states that she has 2 pills left. Please advise. She states that a VM can be left if we reach her machine.

## 2019-03-08 NOTE — TELEPHONE ENCOUNTER
Refill passed per [facility], [facility] protocol.     Neurology meds  Refill Protocol Appointment Criteria  · Appointment scheduled in the past 6 months or in the next 3 months  Recent Outpatient Visits            1 month ago Heart palpitations    [facility]

## 2019-03-25 ENCOUNTER — OFFICE VISIT (OUTPATIENT)
Dept: INTERNAL MEDICINE CLINIC | Facility: CLINIC | Age: 79
End: 2019-03-25
Payer: MEDICARE

## 2019-03-25 VITALS
SYSTOLIC BLOOD PRESSURE: 135 MMHG | RESPIRATION RATE: 18 BRPM | DIASTOLIC BLOOD PRESSURE: 70 MMHG | HEIGHT: 61 IN | WEIGHT: 127.63 LBS | TEMPERATURE: 98 F | HEART RATE: 72 BPM | BODY MASS INDEX: 24.1 KG/M2

## 2019-03-25 DIAGNOSIS — I10 ESSENTIAL HYPERTENSION: Primary | ICD-10-CM

## 2019-03-25 DIAGNOSIS — G25.81 RESTLESS LEGS SYNDROME: ICD-10-CM

## 2019-03-25 PROCEDURE — 99214 OFFICE O/P EST MOD 30 MIN: CPT | Performed by: INTERNAL MEDICINE

## 2019-03-25 PROCEDURE — G0463 HOSPITAL OUTPT CLINIC VISIT: HCPCS | Performed by: INTERNAL MEDICINE

## 2019-03-25 NOTE — PROGRESS NOTES
HPI:    Patient ID: Radha Minaya is a 78year old female. Patient presents with:  Hypertension: Pt is f/u with her blood pressure  Test Results: Pend for refill    Patient in office today for follow up visit. States feeling well otherwise.  Denies chest yvette metoprolol Tartrate 25 MG Oral Tab Take 1 tablet (25 mg total) by mouth daily. Disp: 90 tablet Rfl: 0   ALPRAZolam 0.25 MG Oral Tab One at night as needed for sleep Disp: 30 tablet Rfl: 1   aspirin 81 MG Oral Tab Take 81 mg by mouth daily.  Disp:  Rfl: on her daily basis patient laughing and said she will try  And let me know     Nursing note and vitals reviewed. Blood pressure 135/70, pulse 72, temperature 98.2 °F (36.8 °C), temperature source Oral, resp.  rate 18, height 5' 1\" (1.549 m), weight 127

## 2019-05-30 RX ORDER — ROPINIROLE 0.25 MG/1
TABLET, FILM COATED ORAL
Qty: 90 TABLET | Refills: 1 | Status: SHIPPED | OUTPATIENT
Start: 2019-05-30 | End: 2019-11-25

## 2019-06-10 ENCOUNTER — OFFICE VISIT (OUTPATIENT)
Dept: INTERNAL MEDICINE CLINIC | Facility: CLINIC | Age: 79
End: 2019-06-10
Payer: MEDICARE

## 2019-06-10 VITALS
HEART RATE: 66 BPM | DIASTOLIC BLOOD PRESSURE: 72 MMHG | SYSTOLIC BLOOD PRESSURE: 121 MMHG | HEIGHT: 61 IN | BODY MASS INDEX: 23.94 KG/M2 | RESPIRATION RATE: 18 BRPM | TEMPERATURE: 98 F | WEIGHT: 126.81 LBS

## 2019-06-10 DIAGNOSIS — R94.4 DECREASED GFR: ICD-10-CM

## 2019-06-10 DIAGNOSIS — G25.81 RESTLESS LEGS SYNDROME: ICD-10-CM

## 2019-06-10 DIAGNOSIS — I10 ESSENTIAL HYPERTENSION: Primary | ICD-10-CM

## 2019-06-10 PROCEDURE — G0463 HOSPITAL OUTPT CLINIC VISIT: HCPCS | Performed by: INTERNAL MEDICINE

## 2019-06-10 PROCEDURE — 99214 OFFICE O/P EST MOD 30 MIN: CPT | Performed by: INTERNAL MEDICINE

## 2019-06-10 NOTE — PROGRESS NOTES
HPI:    Patient ID: William Sun is a 78year old female. Patient presents with:  Hypertension: Pt is f/u with her blood pressure     Patient in office today for   BP  follow up visit.  States feeling well otherwise , but bumped head  Of her  Furniture 1  W METOPROLOL TARTRATE 25 MG Oral Tab TAKE 1 TABLET BY MOUTH ONCE DAILY Disp: 90 tablet Rfl: 0   AmLODIPine Besylate 5 MG Oral Tab TAKE ONE TABLET BY MOUTH ONCE DAILY Disp: 90 tablet Rfl: 1   Lisinopril-Hydrochlorothiazide (ZESTORETIC) 20-25 MG Oral Tab Take Small    Forehead   Healing    Bruise -    Very  Mild   Tenderness no   Erythema     Psychiatric: She has a normal mood and affect. Her behavior is normal.   Nursing note and vitals reviewed.       Blood pressure 121/72, pulse 66, temperature 98.2 °F (36.8

## 2019-07-01 NOTE — TELEPHONE ENCOUNTER
Refill passed per [facility] protocol.   Hypertensive Medications  Protocol Criteria:  · Appointment scheduled in the past 6 months or in the next 3 months  · BMP or CMP in the past 12 months  · Creatinine result < 2  Recent Outpatient Visits

## 2019-07-22 DIAGNOSIS — I10 ESSENTIAL HYPERTENSION: ICD-10-CM

## 2019-07-22 RX ORDER — LISINOPRIL AND HYDROCHLOROTHIAZIDE 25; 20 MG/1; MG/1
TABLET ORAL
Qty: 90 TABLET | Refills: 1 | Status: SHIPPED | OUTPATIENT
Start: 2019-07-22 | End: 2019-12-31

## 2019-07-23 ENCOUNTER — LAB ENCOUNTER (OUTPATIENT)
Dept: LAB | Age: 79
End: 2019-07-23
Attending: INTERNAL MEDICINE
Payer: MEDICARE

## 2019-07-23 DIAGNOSIS — I10 ESSENTIAL HYPERTENSION: ICD-10-CM

## 2019-07-23 DIAGNOSIS — R94.4 DECREASED GFR: ICD-10-CM

## 2019-07-23 LAB
ALBUMIN SERPL-MCNC: 3.7 G/DL (ref 3.4–5)
ALBUMIN/GLOB SERPL: 1.1 {RATIO} (ref 1–2)
ALP LIVER SERPL-CCNC: 73 U/L (ref 55–142)
ALT SERPL-CCNC: 26 U/L (ref 13–56)
ANION GAP SERPL CALC-SCNC: 6 MMOL/L (ref 0–18)
AST SERPL-CCNC: 23 U/L (ref 15–37)
BILIRUB SERPL-MCNC: 0.6 MG/DL (ref 0.1–2)
BUN BLD-MCNC: 18 MG/DL (ref 7–18)
BUN/CREAT SERPL: 18 (ref 10–20)
CALCIUM BLD-MCNC: 9.4 MG/DL (ref 8.5–10.1)
CHLORIDE SERPL-SCNC: 103 MMOL/L (ref 98–112)
CO2 SERPL-SCNC: 28 MMOL/L (ref 21–32)
CREAT BLD-MCNC: 1 MG/DL (ref 0.55–1.02)
GLOBULIN PLAS-MCNC: 3.3 G/DL (ref 2.8–4.4)
GLUCOSE BLD-MCNC: 94 MG/DL (ref 70–99)
M PROTEIN MFR SERPL ELPH: 7 G/DL (ref 6.4–8.2)
OSMOLALITY SERPL CALC.SUM OF ELEC: 286 MOSM/KG (ref 275–295)
PATIENT FASTING: YES
POTASSIUM SERPL-SCNC: 3.8 MMOL/L (ref 3.5–5.1)
SODIUM SERPL-SCNC: 137 MMOL/L (ref 136–145)

## 2019-07-23 PROCEDURE — 80053 COMPREHEN METABOLIC PANEL: CPT

## 2019-07-23 PROCEDURE — 36415 COLL VENOUS BLD VENIPUNCTURE: CPT

## 2019-07-23 NOTE — TELEPHONE ENCOUNTER
Refill passed per [facility] protocol.   Hypertensive Medications  Protocol Criteria:  · Appointment scheduled in the past 6 months or in the next 3 months  · BMP or CMP in the past 12 months  · Creatinine result < 2  Recent Outpatient Visits

## 2019-07-29 NOTE — PROGRESS NOTES
Please call patient with blood test results.     Kidney function decreased mildly and stable-avoid an aspirin-based medicine ibuprofen Advil or Aleve keep with good hydration   and liver function are normal    Electrolytes sodium potassium normal    sugar i

## 2019-08-12 ENCOUNTER — OFFICE VISIT (OUTPATIENT)
Dept: INTERNAL MEDICINE CLINIC | Facility: CLINIC | Age: 79
End: 2019-08-12
Payer: MEDICARE

## 2019-08-12 VITALS
BODY MASS INDEX: 24.13 KG/M2 | HEART RATE: 65 BPM | DIASTOLIC BLOOD PRESSURE: 69 MMHG | WEIGHT: 127.81 LBS | TEMPERATURE: 98 F | SYSTOLIC BLOOD PRESSURE: 137 MMHG | HEIGHT: 61 IN | RESPIRATION RATE: 18 BRPM

## 2019-08-12 DIAGNOSIS — R94.4 DECREASED GFR: ICD-10-CM

## 2019-08-12 DIAGNOSIS — G25.81 RESTLESS LEGS SYNDROME: ICD-10-CM

## 2019-08-12 DIAGNOSIS — I10 ESSENTIAL HYPERTENSION: Primary | ICD-10-CM

## 2019-08-12 PROCEDURE — 99214 OFFICE O/P EST MOD 30 MIN: CPT | Performed by: INTERNAL MEDICINE

## 2019-08-12 PROCEDURE — G0463 HOSPITAL OUTPT CLINIC VISIT: HCPCS | Performed by: INTERNAL MEDICINE

## 2019-08-12 NOTE — PROGRESS NOTES
HPI:    Patient ID: Arelis Monaco is a 78year old female. Patient presents with:  Hypertension: Pt state that she is f/u with her blood pressure and recent labs  Patient in office today for    bp   Check   Patient states feeling well otherwise.  Denies ches BY MOUTH AT NIGHT 2-3  HOURS  BEFORE  BEDTIME Disp: 90 tablet Rfl: 1   AmLODIPine Besylate 5 MG Oral Tab TAKE ONE TABLET BY MOUTH ONCE DAILY Disp: 90 tablet Rfl: 1   aspirin 81 MG Oral Tab Take 81 mg by mouth daily.  Disp:  Rfl:    ALPRAZolam 0.25 MG Oral T blood pressure medication as perscribed   Low- sodium diet   Maintain walking - as tolerated   Track and record blood pressure and heart rate at home   The side effects of medication discussed with patient   Patient verbalizes understanding and compliance

## 2019-08-27 DIAGNOSIS — I10 ESSENTIAL HYPERTENSION: ICD-10-CM

## 2019-08-28 RX ORDER — AMLODIPINE BESYLATE 5 MG/1
TABLET ORAL
Qty: 90 TABLET | Refills: 1 | Status: SHIPPED | OUTPATIENT
Start: 2019-08-28 | End: 2020-02-25

## 2019-08-29 NOTE — TELEPHONE ENCOUNTER
Refill passed per [facility], [facility] protocol.   Hypertensive Medications  Protocol Criteria:  · Appointment scheduled in the past 6 months or in the next 3 months  · BMP or CMP in the past 12 months  · Creatinine result < 2  Recent Outpatient Visits

## 2019-10-23 ENCOUNTER — OFFICE VISIT (OUTPATIENT)
Dept: INTERNAL MEDICINE CLINIC | Facility: CLINIC | Age: 79
End: 2019-10-23
Payer: MEDICARE

## 2019-10-23 VITALS
TEMPERATURE: 98 F | DIASTOLIC BLOOD PRESSURE: 68 MMHG | HEIGHT: 61 IN | HEART RATE: 67 BPM | WEIGHT: 129.19 LBS | BODY MASS INDEX: 24.39 KG/M2 | SYSTOLIC BLOOD PRESSURE: 124 MMHG

## 2019-10-23 DIAGNOSIS — H92.01 RIGHT EAR PAIN: Primary | ICD-10-CM

## 2019-10-23 PROCEDURE — 99213 OFFICE O/P EST LOW 20 MIN: CPT | Performed by: PHYSICIAN ASSISTANT

## 2019-10-23 PROCEDURE — G0463 HOSPITAL OUTPT CLINIC VISIT: HCPCS | Performed by: PHYSICIAN ASSISTANT

## 2019-10-23 RX ORDER — AZITHROMYCIN 250 MG/1
TABLET, FILM COATED ORAL
Qty: 6 TABLET | Refills: 0 | Status: SHIPPED | OUTPATIENT
Start: 2019-10-23 | End: 2019-11-04 | Stop reason: ALTCHOICE

## 2019-10-23 NOTE — PROGRESS NOTES
HPI:    Patient ID: Daniela Tadeo is a 78year old female. HPI   Patient presents with right ear pain since Sunday, has tried tylenol for it with no relief. Denies any other sx at his time. Review of Systems   Constitutional: Negative.     HENT: Jose Antonio Tran 3.2 oz (58.6 kg)   BMI 24.41 kg/m²   Body mass index is 24.41 kg/m². Physical Exam    Constitutional: She is oriented to person, place, and time and thin. She appears well-developed and well-nourished. HENT:   Head: Normocephalic and atraumatic.    Right

## 2019-11-04 ENCOUNTER — OFFICE VISIT (OUTPATIENT)
Dept: INTERNAL MEDICINE CLINIC | Facility: CLINIC | Age: 79
End: 2019-11-04
Payer: MEDICARE

## 2019-11-04 VITALS
HEART RATE: 60 BPM | DIASTOLIC BLOOD PRESSURE: 69 MMHG | SYSTOLIC BLOOD PRESSURE: 135 MMHG | WEIGHT: 129 LBS | BODY MASS INDEX: 24.35 KG/M2 | RESPIRATION RATE: 18 BRPM | TEMPERATURE: 98 F | HEIGHT: 61 IN | OXYGEN SATURATION: 98 %

## 2019-11-04 DIAGNOSIS — I10 ESSENTIAL HYPERTENSION: Primary | ICD-10-CM

## 2019-11-04 DIAGNOSIS — R94.4 DECREASED GFR: ICD-10-CM

## 2019-11-04 DIAGNOSIS — Z23 INFLUENZA VACCINATION GIVEN: ICD-10-CM

## 2019-11-04 DIAGNOSIS — E55.9 VITAMIN D DEFICIENCY: ICD-10-CM

## 2019-11-04 DIAGNOSIS — G25.81 RESTLESS LEGS SYNDROME: ICD-10-CM

## 2019-11-04 PROCEDURE — 90662 IIV NO PRSV INCREASED AG IM: CPT | Performed by: INTERNAL MEDICINE

## 2019-11-04 PROCEDURE — 99214 OFFICE O/P EST MOD 30 MIN: CPT | Performed by: INTERNAL MEDICINE

## 2019-11-04 PROCEDURE — G0463 HOSPITAL OUTPT CLINIC VISIT: HCPCS | Performed by: INTERNAL MEDICINE

## 2019-11-04 PROCEDURE — G0008 ADMIN INFLUENZA VIRUS VAC: HCPCS | Performed by: INTERNAL MEDICINE

## 2019-11-04 NOTE — PROGRESS NOTES
HPI:    Patient ID: Bong Castillo is a 78year old female.   Patient presents with:  Hypertension: Pt is f/u with her blood pressure    Patient in office today for    HTN    Patient states feeling well otherwise    Denies  Any complains today  Denies  chest p HCL 0.25 MG Oral Tab TAKE 1 TABLET BY MOUTH AT NIGHT 2-3  HOURS  BEFORE  BEDTIME 90 tablet 1   • ALPRAZolam 0.25 MG Oral Tab One at night as needed for sleep 30 tablet 1   • aspirin 81 MG Oral Tab Take 81 mg by mouth daily.        Allergies:Not on File   [facility] diagnosis)    Take medication as perscribed   Low- sodium diet   Maintain walking - as tolerated   Track and record blood pressure and heart rate at home   The side effects of medication discussed with patient   Patient verbalizes understanding and complia

## 2019-11-25 RX ORDER — ROPINIROLE 0.25 MG/1
TABLET, FILM COATED ORAL
Qty: 90 TABLET | Refills: 1 | Status: SHIPPED | OUTPATIENT
Start: 2019-11-25 | End: 2020-05-22

## 2019-11-26 NOTE — TELEPHONE ENCOUNTER
Refill passed per [facility], [facility] protocol.   Refill Protocol Appointment Criteria  · Appointment scheduled in the past 6 months or in the next 3 months  Recent Outpatient Visits            3 weeks ago Essential hypertension    [address]

## 2019-12-30 DIAGNOSIS — I10 ESSENTIAL HYPERTENSION: ICD-10-CM

## 2019-12-31 RX ORDER — LISINOPRIL AND HYDROCHLOROTHIAZIDE 25; 20 MG/1; MG/1
TABLET ORAL
Qty: 90 TABLET | Refills: 1 | Status: SHIPPED | OUTPATIENT
Start: 2019-12-31 | End: 2020-07-16

## 2020-02-24 DIAGNOSIS — I10 ESSENTIAL HYPERTENSION: ICD-10-CM

## 2020-02-25 RX ORDER — AMLODIPINE BESYLATE 5 MG/1
TABLET ORAL
Qty: 90 TABLET | Refills: 1 | Status: SHIPPED | OUTPATIENT
Start: 2020-02-25 | End: 2020-08-26

## 2020-03-09 ENCOUNTER — OFFICE VISIT (OUTPATIENT)
Dept: INTERNAL MEDICINE CLINIC | Facility: CLINIC | Age: 80
End: 2020-03-09
Payer: MEDICARE

## 2020-03-09 VITALS
DIASTOLIC BLOOD PRESSURE: 70 MMHG | HEART RATE: 65 BPM | RESPIRATION RATE: 17 BRPM | HEIGHT: 61 IN | SYSTOLIC BLOOD PRESSURE: 128 MMHG | WEIGHT: 129 LBS | BODY MASS INDEX: 24.35 KG/M2

## 2020-03-09 DIAGNOSIS — G25.81 RESTLESS LEGS SYNDROME: ICD-10-CM

## 2020-03-09 DIAGNOSIS — I10 ESSENTIAL HYPERTENSION: Primary | ICD-10-CM

## 2020-03-09 DIAGNOSIS — R94.4 DECREASED GFR: ICD-10-CM

## 2020-03-09 PROCEDURE — 99214 OFFICE O/P EST MOD 30 MIN: CPT | Performed by: INTERNAL MEDICINE

## 2020-03-09 PROCEDURE — G0463 HOSPITAL OUTPT CLINIC VISIT: HCPCS | Performed by: INTERNAL MEDICINE

## 2020-03-09 NOTE — PROGRESS NOTES
William Sun   HPI:    Patient ID: William Sun is a [de-identified]year old female.   Patient presents with:  Medication Follow-Up: 3 m.o    Patient in office today for    HTN    Patient states feeling well otherwise    Denies  Any complains today  Denies  chest pain, s 0.25 MG Oral Tab TAKE 1 TABLET BY MOUTH AT NIGHT 2-3  HOURS  BEFORE  BEDTIME 90 tablet 1   • ALPRAZolam 0.25 MG Oral Tab One at night as needed for sleep 30 tablet 1   • aspirin 81 MG Oral Tab Take 81 mg by mouth daily.        Allergies:No Known Allergies Maintain walking - as tolerated   Track and record blood pressure and heart rate at home   The side effects of medication discussed with patient   Patient verbalizes understanding and compliance  Lisinopril / hctz  29/25  Mg qd    amlodipine 5 mg  Qd  me

## 2020-03-11 ENCOUNTER — LAB ENCOUNTER (OUTPATIENT)
Dept: LAB | Age: 80
End: 2020-03-11
Attending: INTERNAL MEDICINE
Payer: MEDICARE

## 2020-03-11 DIAGNOSIS — E55.9 VITAMIN D DEFICIENCY: ICD-10-CM

## 2020-03-11 DIAGNOSIS — G25.81 RESTLESS LEGS SYNDROME: ICD-10-CM

## 2020-03-11 DIAGNOSIS — I10 ESSENTIAL HYPERTENSION: ICD-10-CM

## 2020-03-11 DIAGNOSIS — R94.4 DECREASED GFR: ICD-10-CM

## 2020-03-11 LAB
ALBUMIN SERPL-MCNC: 3.4 G/DL (ref 3.4–5)
ALBUMIN/GLOB SERPL: 1 {RATIO} (ref 1–2)
ALP LIVER SERPL-CCNC: 73 U/L (ref 55–142)
ALT SERPL-CCNC: 41 U/L (ref 13–56)
ANION GAP SERPL CALC-SCNC: 9 MMOL/L (ref 0–18)
AST SERPL-CCNC: 37 U/L (ref 15–37)
BASOPHILS # BLD AUTO: 0.06 X10(3) UL (ref 0–0.2)
BASOPHILS NFR BLD AUTO: 1.2 %
BILIRUB SERPL-MCNC: 0.7 MG/DL (ref 0.1–2)
BILIRUB UR QL: NEGATIVE
BUN BLD-MCNC: 20 MG/DL (ref 7–18)
BUN/CREAT SERPL: 19 (ref 10–20)
CALCIUM BLD-MCNC: 9.4 MG/DL (ref 8.5–10.1)
CHLORIDE SERPL-SCNC: 98 MMOL/L (ref 98–112)
CHOLEST SMN-MCNC: 177 MG/DL (ref ?–200)
CLARITY UR: CLEAR
CO2 SERPL-SCNC: 27 MMOL/L (ref 21–32)
COLOR UR: YELLOW
CREAT BLD-MCNC: 1.05 MG/DL (ref 0.55–1.02)
DEPRECATED RDW RBC AUTO: 42 FL (ref 35.1–46.3)
EOSINOPHIL # BLD AUTO: 0.23 X10(3) UL (ref 0–0.7)
EOSINOPHIL NFR BLD AUTO: 4.4 %
ERYTHROCYTE [DISTWIDTH] IN BLOOD BY AUTOMATED COUNT: 13.2 % (ref 11–15)
GLOBULIN PLAS-MCNC: 3.4 G/DL (ref 2.8–4.4)
GLUCOSE BLD-MCNC: 95 MG/DL (ref 70–99)
GLUCOSE UR-MCNC: NEGATIVE MG/DL
HCT VFR BLD AUTO: 37.1 % (ref 35–48)
HDLC SERPL-MCNC: 57 MG/DL (ref 40–59)
HGB BLD-MCNC: 12.5 G/DL (ref 12–16)
HGB UR QL STRIP.AUTO: NEGATIVE
IMM GRANULOCYTES # BLD AUTO: 0 X10(3) UL (ref 0–1)
IMM GRANULOCYTES NFR BLD: 0 %
KETONES UR-MCNC: NEGATIVE MG/DL
LDLC SERPL CALC-MCNC: 101 MG/DL (ref ?–100)
LYMPHOCYTES # BLD AUTO: 2.14 X10(3) UL (ref 1–4)
LYMPHOCYTES NFR BLD AUTO: 41.4 %
M PROTEIN MFR SERPL ELPH: 6.8 G/DL (ref 6.4–8.2)
MCH RBC QN AUTO: 29 PG (ref 26–34)
MCHC RBC AUTO-ENTMCNC: 33.7 G/DL (ref 31–37)
MCV RBC AUTO: 86.1 FL (ref 80–100)
MONOCYTES # BLD AUTO: 0.49 X10(3) UL (ref 0.1–1)
MONOCYTES NFR BLD AUTO: 9.5 %
NEUTROPHILS # BLD AUTO: 2.25 X10 (3) UL (ref 1.5–7.7)
NEUTROPHILS # BLD AUTO: 2.25 X10(3) UL (ref 1.5–7.7)
NEUTROPHILS NFR BLD AUTO: 43.5 %
NITRITE UR QL STRIP.AUTO: NEGATIVE
NONHDLC SERPL-MCNC: 120 MG/DL (ref ?–130)
OSMOLALITY SERPL CALC.SUM OF ELEC: 280 MOSM/KG (ref 275–295)
PATIENT FASTING Y/N/NP: YES
PATIENT FASTING Y/N/NP: YES
PH UR: 6 [PH] (ref 5–8)
PLATELET # BLD AUTO: 274 10(3)UL (ref 150–450)
POTASSIUM SERPL-SCNC: 3.6 MMOL/L (ref 3.5–5.1)
PROT UR-MCNC: NEGATIVE MG/DL
RBC # BLD AUTO: 4.31 X10(6)UL (ref 3.8–5.3)
RBC #/AREA URNS AUTO: 0 /HPF
SODIUM SERPL-SCNC: 134 MMOL/L (ref 136–145)
SP GR UR STRIP: 1.01 (ref 1–1.03)
TRIGL SERPL-MCNC: 96 MG/DL (ref 30–149)
TSI SER-ACNC: 1.64 MIU/ML (ref 0.36–3.74)
UROBILINOGEN UR STRIP-ACNC: <2
VLDLC SERPL CALC-MCNC: 19 MG/DL (ref 0–30)
WBC # BLD AUTO: 5.2 X10(3) UL (ref 4–11)
WBC #/AREA URNS AUTO: 1 /HPF

## 2020-03-11 PROCEDURE — 85025 COMPLETE CBC W/AUTO DIFF WBC: CPT

## 2020-03-11 PROCEDURE — 36415 COLL VENOUS BLD VENIPUNCTURE: CPT

## 2020-03-11 PROCEDURE — 80053 COMPREHEN METABOLIC PANEL: CPT

## 2020-03-11 PROCEDURE — 84443 ASSAY THYROID STIM HORMONE: CPT

## 2020-03-11 PROCEDURE — 82306 VITAMIN D 25 HYDROXY: CPT

## 2020-03-11 PROCEDURE — 81001 URINALYSIS AUTO W/SCOPE: CPT

## 2020-03-11 PROCEDURE — 80061 LIPID PANEL: CPT

## 2020-03-13 LAB — 25(OH)D3 SERPL-MCNC: 40.7 NG/ML (ref 30–100)

## 2020-03-15 NOTE — PROGRESS NOTES
Please call patient with blood test results.     Kidney function mildly decreased -avoid ibuprofen Advil Aleve pain medication that can hurt the kidney function keep with good hydration -can use Tylenol for any aches and pains  Sodium mildly decreased likel

## 2020-05-22 RX ORDER — ROPINIROLE 0.25 MG/1
TABLET, FILM COATED ORAL
Qty: 90 TABLET | Refills: 0 | Status: SHIPPED | OUTPATIENT
Start: 2020-05-22 | End: 2020-08-26

## 2020-07-15 DIAGNOSIS — I10 ESSENTIAL HYPERTENSION: ICD-10-CM

## 2020-07-16 RX ORDER — LISINOPRIL AND HYDROCHLOROTHIAZIDE 25; 20 MG/1; MG/1
TABLET ORAL
Qty: 90 TABLET | Refills: 0 | Status: SHIPPED | OUTPATIENT
Start: 2020-07-16 | End: 2020-10-13

## 2020-07-30 ENCOUNTER — OFFICE VISIT (OUTPATIENT)
Dept: INTERNAL MEDICINE CLINIC | Facility: CLINIC | Age: 80
End: 2020-07-30
Payer: MEDICARE

## 2020-07-30 VITALS
HEIGHT: 61 IN | HEART RATE: 73 BPM | WEIGHT: 132 LBS | TEMPERATURE: 97 F | DIASTOLIC BLOOD PRESSURE: 76 MMHG | SYSTOLIC BLOOD PRESSURE: 119 MMHG | BODY MASS INDEX: 24.92 KG/M2

## 2020-07-30 DIAGNOSIS — I10 ESSENTIAL HYPERTENSION: Primary | ICD-10-CM

## 2020-07-30 DIAGNOSIS — G25.81 RESTLESS LEGS SYNDROME: ICD-10-CM

## 2020-07-30 DIAGNOSIS — R94.4 DECREASED GFR: ICD-10-CM

## 2020-07-30 PROCEDURE — 99214 OFFICE O/P EST MOD 30 MIN: CPT | Performed by: INTERNAL MEDICINE

## 2020-07-30 PROCEDURE — G0463 HOSPITAL OUTPT CLINIC VISIT: HCPCS | Performed by: INTERNAL MEDICINE

## 2020-07-30 RX ORDER — PHENOL 1.4 %
1 AEROSOL, SPRAY (ML) MUCOUS MEMBRANE DAILY
COMMUNITY

## 2020-07-30 RX ORDER — RIBOFLAVIN (VITAMIN B2) 100 MG
100 TABLET ORAL DAILY
COMMUNITY

## 2020-07-30 RX ORDER — BIOTIN 1 MG
1 TABLET ORAL DAILY
COMMUNITY

## 2020-07-30 NOTE — PROGRESS NOTES
Bong Castillo   HPI:    Patient ID: Bong Castillo is a [de-identified]year old female.   Patient presents with:  Hypertension: follow up    Patient in office today for    HTN    Patient states feeling well otherwise    Denies  Any complains today  Denies  chest pain, short LISINOPRIL-HYDROCHLOROTHIAZIDE 20-25 MG Oral Tab Take 1 tablet by mouth once daily 90 tablet 0   • METOPROLOL TARTRATE 25 MG Oral Tab Take 1 tablet by mouth once daily 90 tablet 0   • ROPINIROLE HCL 0.25 MG Oral Tab TAKE 1 TABLET BY MOUTH AT NIGHT 2-3  YONY dry.   Psychiatric: She has a normal mood and affect. Her behavior is normal.   Nursing note and vitals reviewed.       Blood pressure 119/76, pulse 73, temperature 97 °F (36.1 °C), temperature source Temporal, height 5' 1\" (1.549 m), weight 132 lb (59.9 k

## 2020-08-25 DIAGNOSIS — I10 ESSENTIAL HYPERTENSION: ICD-10-CM

## 2020-08-26 RX ORDER — ROPINIROLE 0.25 MG/1
TABLET, FILM COATED ORAL
Qty: 90 TABLET | Refills: 3 | Status: SHIPPED | OUTPATIENT
Start: 2020-08-26 | End: 2021-08-25

## 2020-08-26 RX ORDER — AMLODIPINE BESYLATE 5 MG/1
TABLET ORAL
Qty: 90 TABLET | Refills: 3 | Status: SHIPPED | OUTPATIENT
Start: 2020-08-26 | End: 2021-08-19

## 2020-10-12 DIAGNOSIS — I10 ESSENTIAL HYPERTENSION: ICD-10-CM

## 2020-10-13 RX ORDER — LISINOPRIL AND HYDROCHLOROTHIAZIDE 25; 20 MG/1; MG/1
TABLET ORAL
Qty: 90 TABLET | Refills: 0 | Status: SHIPPED | OUTPATIENT
Start: 2020-10-13 | End: 2021-01-07

## 2020-12-14 ENCOUNTER — OFFICE VISIT (OUTPATIENT)
Dept: INTERNAL MEDICINE CLINIC | Facility: CLINIC | Age: 80
End: 2020-12-14
Payer: MEDICARE

## 2020-12-14 VITALS
SYSTOLIC BLOOD PRESSURE: 137 MMHG | OXYGEN SATURATION: 99 % | WEIGHT: 130 LBS | DIASTOLIC BLOOD PRESSURE: 74 MMHG | HEIGHT: 61 IN | HEART RATE: 66 BPM | BODY MASS INDEX: 24.55 KG/M2

## 2020-12-14 DIAGNOSIS — G25.81 RESTLESS LEGS SYNDROME: ICD-10-CM

## 2020-12-14 DIAGNOSIS — I10 ESSENTIAL HYPERTENSION: Primary | ICD-10-CM

## 2020-12-14 DIAGNOSIS — R94.4 DECREASED GFR: ICD-10-CM

## 2020-12-14 PROCEDURE — 99214 OFFICE O/P EST MOD 30 MIN: CPT | Performed by: INTERNAL MEDICINE

## 2020-12-14 PROCEDURE — G0463 HOSPITAL OUTPT CLINIC VISIT: HCPCS | Performed by: INTERNAL MEDICINE

## 2020-12-14 NOTE — PROGRESS NOTES
HPI:    Patient ID: Jaye Palomares is a [de-identified]year old female. Patient presents with:  Hypertension    Patient in office today for  HTN   Patient states feeling well otherwise.  Denies chest pain, shortnesss of breath, palpitations, or abdominal pain, denies UTI UT) Oral Cap Take 1 tablet by mouth daily. • aspirin 81 MG Oral Tab Take 81 mg by mouth daily. Allergies:No Known Allergies   PHYSICAL EXAM:   Physical Exam   Constitutional: She is oriented to person, place, and time.  She appears well-developed effects of medication discussed with patient   Patient verbalizes understanding and compliance  stable cpm   Amlodipine 5 mg qd   Lisinopril /hctz 10/25 mg qd   Metoprolol 25 mg qd   Labs  To complete     Decreased  GFR    Avoiding  NSAIDs    Fluids   Labs

## 2021-01-07 DIAGNOSIS — I10 ESSENTIAL HYPERTENSION: ICD-10-CM

## 2021-01-07 RX ORDER — LISINOPRIL AND HYDROCHLOROTHIAZIDE 25; 20 MG/1; MG/1
TABLET ORAL
Qty: 90 TABLET | Refills: 0 | Status: SHIPPED | OUTPATIENT
Start: 2021-01-07 | End: 2021-04-08

## 2021-01-15 ENCOUNTER — LAB ENCOUNTER (OUTPATIENT)
Dept: LAB | Facility: HOSPITAL | Age: 81
End: 2021-01-15
Attending: INTERNAL MEDICINE
Payer: MEDICARE

## 2021-01-15 DIAGNOSIS — I10 ESSENTIAL HYPERTENSION: ICD-10-CM

## 2021-01-15 DIAGNOSIS — R94.4 DECREASED GFR: ICD-10-CM

## 2021-01-15 LAB
ALBUMIN SERPL-MCNC: 3.8 G/DL (ref 3.4–5)
ALBUMIN/GLOB SERPL: 1.2 {RATIO} (ref 1–2)
ALP LIVER SERPL-CCNC: 81 U/L
ALT SERPL-CCNC: 26 U/L
ANION GAP SERPL CALC-SCNC: 7 MMOL/L (ref 0–18)
AST SERPL-CCNC: 27 U/L (ref 15–37)
BASOPHILS # BLD AUTO: 0.08 X10(3) UL (ref 0–0.2)
BASOPHILS NFR BLD AUTO: 0.9 %
BILIRUB SERPL-MCNC: 0.8 MG/DL (ref 0.1–2)
BILIRUB UR QL: NEGATIVE
BUN BLD-MCNC: 13 MG/DL (ref 7–18)
BUN/CREAT SERPL: 12.9 (ref 10–20)
CALCIUM BLD-MCNC: 9.5 MG/DL (ref 8.5–10.1)
CHLORIDE SERPL-SCNC: 102 MMOL/L (ref 98–112)
CHOLEST SMN-MCNC: 169 MG/DL (ref ?–200)
CO2 SERPL-SCNC: 28 MMOL/L (ref 21–32)
COLOR UR: YELLOW
CREAT BLD-MCNC: 1.01 MG/DL
DEPRECATED RDW RBC AUTO: 41.4 FL (ref 35.1–46.3)
EOSINOPHIL # BLD AUTO: 0.35 X10(3) UL (ref 0–0.7)
EOSINOPHIL NFR BLD AUTO: 4.1 %
ERYTHROCYTE [DISTWIDTH] IN BLOOD BY AUTOMATED COUNT: 13.2 % (ref 11–15)
GLOBULIN PLAS-MCNC: 3.3 G/DL (ref 2.8–4.4)
GLUCOSE BLD-MCNC: 96 MG/DL (ref 70–99)
GLUCOSE UR-MCNC: NEGATIVE MG/DL
HCT VFR BLD AUTO: 40 %
HDLC SERPL-MCNC: 59 MG/DL (ref 40–59)
HGB BLD-MCNC: 13.3 G/DL
HGB UR QL STRIP.AUTO: NEGATIVE
IMM GRANULOCYTES # BLD AUTO: 0.03 X10(3) UL (ref 0–1)
IMM GRANULOCYTES NFR BLD: 0.4 %
KETONES UR-MCNC: NEGATIVE MG/DL
LDLC SERPL CALC-MCNC: 91 MG/DL (ref ?–100)
LYMPHOCYTES # BLD AUTO: 2.63 X10(3) UL (ref 1–4)
LYMPHOCYTES NFR BLD AUTO: 30.7 %
M PROTEIN MFR SERPL ELPH: 7.1 G/DL (ref 6.4–8.2)
MCH RBC QN AUTO: 28.8 PG (ref 26–34)
MCHC RBC AUTO-ENTMCNC: 33.3 G/DL (ref 31–37)
MCV RBC AUTO: 86.6 FL
MONOCYTES # BLD AUTO: 0.59 X10(3) UL (ref 0.1–1)
MONOCYTES NFR BLD AUTO: 6.9 %
NEUTROPHILS # BLD AUTO: 4.88 X10 (3) UL (ref 1.5–7.7)
NEUTROPHILS # BLD AUTO: 4.88 X10(3) UL (ref 1.5–7.7)
NEUTROPHILS NFR BLD AUTO: 57 %
NITRITE UR QL STRIP.AUTO: NEGATIVE
NONHDLC SERPL-MCNC: 110 MG/DL (ref ?–130)
OSMOLALITY SERPL CALC.SUM OF ELEC: 284 MOSM/KG (ref 275–295)
PATIENT FASTING Y/N/NP: YES
PATIENT FASTING Y/N/NP: YES
PH UR: 7 [PH] (ref 5–8)
PLATELET # BLD AUTO: 297 10(3)UL (ref 150–450)
POTASSIUM SERPL-SCNC: 3.6 MMOL/L (ref 3.5–5.1)
PROT UR-MCNC: NEGATIVE MG/DL
RBC # BLD AUTO: 4.62 X10(6)UL
RBC #/AREA URNS AUTO: 6 /HPF
SODIUM SERPL-SCNC: 137 MMOL/L (ref 136–145)
SP GR UR STRIP: 1.01 (ref 1–1.03)
TRIGL SERPL-MCNC: 95 MG/DL (ref 30–149)
TSI SER-ACNC: 2.08 MIU/ML (ref 0.36–3.74)
UROBILINOGEN UR STRIP-ACNC: <2
VLDLC SERPL CALC-MCNC: 19 MG/DL (ref 0–30)
WBC # BLD AUTO: 8.6 X10(3) UL (ref 4–11)
WBC #/AREA URNS AUTO: 202 /HPF

## 2021-01-15 PROCEDURE — 80061 LIPID PANEL: CPT

## 2021-01-15 PROCEDURE — 85025 COMPLETE CBC W/AUTO DIFF WBC: CPT

## 2021-01-15 PROCEDURE — 81001 URINALYSIS AUTO W/SCOPE: CPT

## 2021-01-15 PROCEDURE — 80053 COMPREHEN METABOLIC PANEL: CPT

## 2021-01-15 PROCEDURE — 36415 COLL VENOUS BLD VENIPUNCTURE: CPT

## 2021-01-15 PROCEDURE — 84443 ASSAY THYROID STIM HORMONE: CPT

## 2021-01-21 NOTE — PROGRESS NOTES
Please call patient with blood test results. Kidney function mildly decreased and stable   and liver function are normal, no anemia. Cholesterol is normal   sugar is normal,  Thyroid hormone is normal as well.    Urine is unclear -if any urinary sympto

## 2021-01-25 ENCOUNTER — TELEPHONE (OUTPATIENT)
Dept: INTERNAL MEDICINE CLINIC | Facility: CLINIC | Age: 81
End: 2021-01-25

## 2021-01-25 NOTE — TELEPHONE ENCOUNTER
Advised pt of providers note regarding recent labs. Pt verbalized understanding and no further questions or concerns.

## 2021-03-08 ENCOUNTER — OFFICE VISIT (OUTPATIENT)
Dept: INTERNAL MEDICINE CLINIC | Facility: CLINIC | Age: 81
End: 2021-03-08
Payer: MEDICARE

## 2021-03-08 VITALS
BODY MASS INDEX: 24.17 KG/M2 | OXYGEN SATURATION: 96 % | HEART RATE: 75 BPM | WEIGHT: 128 LBS | HEIGHT: 61 IN | DIASTOLIC BLOOD PRESSURE: 70 MMHG | SYSTOLIC BLOOD PRESSURE: 120 MMHG

## 2021-03-08 DIAGNOSIS — E55.9 VITAMIN D DEFICIENCY: ICD-10-CM

## 2021-03-08 DIAGNOSIS — I10 ESSENTIAL HYPERTENSION: Primary | ICD-10-CM

## 2021-03-08 DIAGNOSIS — R94.4 DECREASED GFR: ICD-10-CM

## 2021-03-08 DIAGNOSIS — G25.81 RESTLESS LEGS SYNDROME: ICD-10-CM

## 2021-03-08 PROCEDURE — 99214 OFFICE O/P EST MOD 30 MIN: CPT | Performed by: INTERNAL MEDICINE

## 2021-03-08 NOTE — PROGRESS NOTES
HPI:    Patient ID: Glendale Gilford is a 80year old female. Patient presents with:  Hypertension: Recent labs completed on 1/15/21    Patient in office today for  HTN   Patient states feeling well otherwise.  Denies chest pain, shortnesss of breath, palpitati Cholecalciferol (VITAMIN D3) 25 MCG (1000 UT) Oral Cap Take 1 tablet by mouth daily. • aspirin 81 MG Oral Tab Take 81 mg by mouth daily. Allergies:No Known Allergies   PHYSICAL EXAM:   Physical Exam  Vitals and nursing note reviewed.    Seth tolerated   Track and record blood pressure and heart rate at home   The side effects of medication discussed with patient   Patient verbalizes understanding and compliance  stable cpm   Amlodipine 5 mg qd   Lisinopril /hctz 10/25 mg qd   Metoprolol 25 mg

## 2021-04-08 DIAGNOSIS — I10 ESSENTIAL HYPERTENSION: ICD-10-CM

## 2021-04-08 RX ORDER — LISINOPRIL AND HYDROCHLOROTHIAZIDE 25; 20 MG/1; MG/1
TABLET ORAL
Qty: 90 TABLET | Refills: 0 | Status: SHIPPED | OUTPATIENT
Start: 2021-04-08 | End: 2021-06-10

## 2021-06-09 DIAGNOSIS — I10 ESSENTIAL HYPERTENSION: ICD-10-CM

## 2021-06-10 RX ORDER — LISINOPRIL AND HYDROCHLOROTHIAZIDE 25; 20 MG/1; MG/1
TABLET ORAL
Qty: 90 TABLET | Refills: 0 | Status: SHIPPED | OUTPATIENT
Start: 2021-06-10 | End: 2021-10-08

## 2021-06-21 ENCOUNTER — OFFICE VISIT (OUTPATIENT)
Dept: INTERNAL MEDICINE CLINIC | Facility: CLINIC | Age: 81
End: 2021-06-21
Payer: MEDICARE

## 2021-06-21 VITALS
DIASTOLIC BLOOD PRESSURE: 57 MMHG | BODY MASS INDEX: 23.98 KG/M2 | OXYGEN SATURATION: 98 % | HEIGHT: 61 IN | WEIGHT: 127 LBS | SYSTOLIC BLOOD PRESSURE: 133 MMHG | HEART RATE: 56 BPM

## 2021-06-21 DIAGNOSIS — I10 ESSENTIAL HYPERTENSION: Primary | ICD-10-CM

## 2021-06-21 DIAGNOSIS — R94.4 DECREASED GFR: ICD-10-CM

## 2021-06-21 DIAGNOSIS — L98.9 SKIN LESIONS: ICD-10-CM

## 2021-06-21 DIAGNOSIS — G25.81 RESTLESS LEGS SYNDROME: ICD-10-CM

## 2021-06-21 PROCEDURE — 99214 OFFICE O/P EST MOD 30 MIN: CPT | Performed by: INTERNAL MEDICINE

## 2021-06-21 NOTE — PROGRESS NOTES
HPI:    Patient ID: Glendale Gilford is a 80year old female. Patient presents with:  Hypertension    Patient in office today for  HTN , state feels  better than ever . Patient states feeling well otherwise.  Denies chest pain, shortnesss of breath, palpitatio AMLODIPINE BESYLATE 5 MG Oral Tab Take 1 tablet by mouth once daily 90 tablet 3   • ROPINIROLE HCL 0.25 MG Oral Tab TAKE 1 TABLET BY MOUTH ONCE DAILY AT NIGHT  2-3 HOURS BEFORE BEDTIME 90 tablet 3   • Ascorbic Acid (VITAMIN C) 100 MG Oral Tab Take 100 mg b height 5' 1\" (1.549 m), weight 127 lb (57.6 kg), SpO2 98 %.              ASSESSMENT/PLAN:   Essential hypertension  (primary encounter diagnosis)  Take high blood pressure medication as perscribed   Low- sodium diet   Maintain walking - as tolerated   Trac

## 2021-06-30 ENCOUNTER — OFFICE VISIT (OUTPATIENT)
Dept: DERMATOLOGY CLINIC | Facility: CLINIC | Age: 81
End: 2021-06-30
Payer: MEDICARE

## 2021-06-30 DIAGNOSIS — D18.01 HEMANGIOMA OF SKIN: ICD-10-CM

## 2021-06-30 DIAGNOSIS — L85.3 DRY SKIN: ICD-10-CM

## 2021-06-30 DIAGNOSIS — L82.1 SEBORRHEIC KERATOSES: Primary | ICD-10-CM

## 2021-06-30 PROCEDURE — 99213 OFFICE O/P EST LOW 20 MIN: CPT | Performed by: PHYSICIAN ASSISTANT

## 2021-06-30 NOTE — PROGRESS NOTES
HPI:    Patient ID: Kendall Nichols is a 80year old female. Patient presents for full body skin exam. She admits to not wearing sunscreen. Has lesions of concern. Had dry itchy skin. Does drink water throughout the day, but also drinks tea and coffee.  She throughout the body as well. Rough to palpation. Neurological:      Mental Status: She is alert and oriented to person, place, and time. ASSESSMENT/PLAN:   1.  Seborrheic keratoses  -After discussion with patient, advised the following:  -

## 2021-08-17 DIAGNOSIS — I10 ESSENTIAL HYPERTENSION: ICD-10-CM

## 2021-08-19 RX ORDER — AMLODIPINE BESYLATE 5 MG/1
TABLET ORAL
Qty: 90 TABLET | Refills: 3 | Status: SHIPPED | OUTPATIENT
Start: 2021-08-19 | End: 2021-10-02

## 2021-08-25 RX ORDER — ROPINIROLE 0.25 MG/1
0.25 TABLET, FILM COATED ORAL DAILY
Qty: 90 TABLET | Refills: 0 | Status: SHIPPED | OUTPATIENT
Start: 2021-08-25 | End: 2021-11-22

## 2021-08-25 NOTE — TELEPHONE ENCOUNTER
Pt is calling for status of her medication refill request. Patient, states that she is out of medication and can be reached at [phone].

## 2021-08-25 NOTE — TELEPHONE ENCOUNTER
Refill passed per [place] [employer] [facility] protocol.     Requested Prescriptions   Pending Prescriptions Disp Refills    ROPINIROLE HCL 0.25 MG Oral Tab [Pharmacy Med Name: rOPINIRole HCl 0.25 MG Oral Tablet] 90 tablet 0     Sig: TAKE 1 TABLET BY MOUTH ONCE DAILY AT NIGHT  2-3 HOURS BEFORE BEDTIME        Neurology Medications Passed - 8/25/2021  2:41 PM        Passed - Appointment in the past 6 or next 3 months              Recent Outpatient Visits              1 month ago Seborrheic keratoses    [facility] Panchito Franks PA-C    Office Visit    2 months ago Essential hypertension    Harmony Roach MD    Office Visit    5 months ago Essential hypertension    Prosper Najera MD    Office Visit    8 months ago Essential hypertension    Prosper Najera MD    Office Visit    1 year ago Essential hypertension    Donis Najera Pamila Jo, MD    Office Visit

## 2021-09-30 ENCOUNTER — HOSPITAL ENCOUNTER (OUTPATIENT)
Facility: HOSPITAL | Age: 81
Setting detail: OBSERVATION
Discharge: HOME | End: 2021-10-02
Attending: EMERGENCY MEDICINE
Payer: MEDICARE

## 2021-09-30 ENCOUNTER — TELEPHONE (OUTPATIENT)
Dept: INTERNAL MEDICINE CLINIC | Facility: CLINIC | Age: 81
End: 2021-09-30

## 2021-09-30 ENCOUNTER — APPOINTMENT (OUTPATIENT)
Dept: GENERAL RADIOLOGY | Facility: HOSPITAL | Age: 81
End: 2021-09-30
Attending: EMERGENCY MEDICINE
Payer: MEDICARE

## 2021-09-30 ENCOUNTER — OFFICE VISIT (OUTPATIENT)
Dept: INTERNAL MEDICINE CLINIC | Facility: CLINIC | Age: 81
End: 2021-09-30
Payer: MEDICARE

## 2021-09-30 ENCOUNTER — LAB ENCOUNTER (OUTPATIENT)
Dept: LAB | Age: 81
End: 2021-09-30
Attending: INTERNAL MEDICINE
Payer: MEDICARE

## 2021-09-30 VITALS
WEIGHT: 125 LBS | OXYGEN SATURATION: 98 % | SYSTOLIC BLOOD PRESSURE: 126 MMHG | DIASTOLIC BLOOD PRESSURE: 82 MMHG | HEART RATE: 92 BPM | BODY MASS INDEX: 23.6 KG/M2 | HEIGHT: 61 IN

## 2021-09-30 DIAGNOSIS — R55 SYNCOPE AND COLLAPSE: ICD-10-CM

## 2021-09-30 DIAGNOSIS — R00.2 HEART PALPITATIONS: Primary | ICD-10-CM

## 2021-09-30 DIAGNOSIS — R94.4 DECREASED GFR: ICD-10-CM

## 2021-09-30 DIAGNOSIS — H53.8 BLURRY VISION, LEFT EYE: ICD-10-CM
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
## 2021-09-30 DIAGNOSIS — I48.91 NEW ONSET A-FIB (HCC): Primary | ICD-10-CM

## 2021-09-30 DIAGNOSIS — I48.91 ATRIAL FIBRILLATION, UNSPECIFIED TYPE (HCC): Primary | ICD-10-CM

## 2021-09-30 DIAGNOSIS — I10 ESSENTIAL HYPERTENSION: ICD-10-CM

## 2021-09-30 DIAGNOSIS — R00.2 HEART PALPITATIONS: ICD-10-CM

## 2021-09-30 PROCEDURE — 93005 ELECTROCARDIOGRAM TRACING: CPT

## 2021-09-30 PROCEDURE — 93010 ELECTROCARDIOGRAM REPORT: CPT | Performed by: INTERNAL MEDICINE

## 2021-09-30 PROCEDURE — 99214 OFFICE O/P EST MOD 30 MIN: CPT | Performed by: INTERNAL MEDICINE

## 2021-09-30 PROCEDURE — 71045 X-RAY EXAM CHEST 1 VIEW: CPT | Performed by: EMERGENCY MEDICINE

## 2021-09-30 PROCEDURE — 99220 INITIAL OBSERVATION CARE,LEVL III: CPT | Performed by: HOSPITALIST

## 2021-09-30 RX ORDER — POTASSIUM CHLORIDE 20 MEQ/1
40 TABLET, EXTENDED RELEASE ORAL ONCE
Status: COMPLETED | OUTPATIENT
Start: 2021-09-30 | End: 2021-09-30

## 2021-10-01 ENCOUNTER — APPOINTMENT (OUTPATIENT)
Dept: CV DIAGNOSTICS | Facility: HOSPITAL | Age: 81
End: 2021-10-01
Attending: HOSPITALIST
Payer: MEDICARE

## 2021-10-01 ENCOUNTER — APPOINTMENT (OUTPATIENT)
Dept: ULTRASOUND IMAGING | Facility: HOSPITAL | Age: 81
End: 2021-10-01
Attending: HOSPITALIST
Payer: MEDICARE

## 2021-10-01 PROCEDURE — 99226 SUBSEQUENT OBSERVATION CARE: CPT | Performed by: HOSPITALIST

## 2021-10-01 PROCEDURE — 93306 TTE W/DOPPLER COMPLETE: CPT | Performed by: HOSPITALIST

## 2021-10-01 PROCEDURE — 93880 EXTRACRANIAL BILAT STUDY: CPT | Performed by: HOSPITALIST

## 2021-10-01 RX ORDER — ASPIRIN 81 MG/1
81 TABLET ORAL DAILY
Status: DISCONTINUED | OUTPATIENT
Start: 2021-10-01 | End: 2021-10-02

## 2021-10-01 RX ORDER — ROPINIROLE 0.25 MG/1
0.25 TABLET, FILM COATED ORAL DAILY
Status: DISCONTINUED | OUTPATIENT
Start: 2021-10-01 | End: 2021-10-02

## 2021-10-01 RX ORDER — METOPROLOL TARTRATE 50 MG/1
50 TABLET, FILM COATED ORAL
Qty: 60 TABLET | Refills: 5 | Status: SHIPPED | OUTPATIENT
Start: 2021-10-01

## 2021-10-01 RX ORDER — ACETAMINOPHEN 325 MG/1
650 TABLET ORAL EVERY 6 HOURS PRN
Status: DISCONTINUED | OUTPATIENT
Start: 2021-10-01 | End: 2021-10-02

## 2021-10-01 RX ORDER — ZOLPIDEM TARTRATE 5 MG/1
5 TABLET ORAL NIGHTLY PRN
Status: DISCONTINUED | OUTPATIENT
Start: 2021-10-01 | End: 2021-10-02

## 2021-10-01 RX ORDER — ONDANSETRON 2 MG/ML
4 INJECTION INTRAMUSCULAR; INTRAVENOUS EVERY 6 HOURS PRN
Status: DISCONTINUED | OUTPATIENT
Start: 2021-10-01 | End: 2021-10-02

## 2021-10-01 RX ORDER — METOPROLOL TARTRATE 50 MG/1
50 TABLET, FILM COATED ORAL
Status: DISCONTINUED | OUTPATIENT
Start: 2021-10-01 | End: 2021-10-02

## 2021-10-01 RX ORDER — HYDRALAZINE HYDROCHLORIDE 20 MG/ML
10 INJECTION INTRAMUSCULAR; INTRAVENOUS EVERY 4 HOURS PRN
Status: DISCONTINUED | OUTPATIENT
Start: 2021-10-01 | End: 2021-10-02

## 2021-10-01 RX ORDER — 0.9 % SODIUM CHLORIDE 0.9 %
1000 INTRAVENOUS SOLUTION INTRAVENOUS ONCE
Status: COMPLETED | OUTPATIENT
Start: 2021-10-01 | End: 2021-10-01

## 2021-10-01 RX ORDER — LISINOPRIL AND HYDROCHLOROTHIAZIDE 25; 20 MG/1; MG/1
1 TABLET ORAL DAILY
Status: DISCONTINUED | OUTPATIENT
Start: 2021-10-01 | End: 2021-10-01 | Stop reason: SDUPTHER

## 2021-10-01 RX ORDER — HEPARIN SODIUM 5000 [USP'U]/ML
5000 INJECTION, SOLUTION INTRAVENOUS; SUBCUTANEOUS EVERY 12 HOURS
Status: DISCONTINUED | OUTPATIENT
Start: 2021-10-01 | End: 2021-10-02

## 2021-10-01 NOTE — CM/SW NOTE
CM called the pharmacy below to inquire about the copay of Eliquis before patient discharges. apixaban 5 MG Oral Tab    Take 1 tablet (5 mg total) by mouth 2 (two) times daily. , Normal, Disp-60 tablet, R-5        I spoke with  Sebastian Hart who report

## 2021-10-01 NOTE — PROGRESS NOTES
Cardiology addendum    Does not have coverage for Eliquis. [facility] provides Xarelto for $85 which I prescribed for discharge.     Guerline Roberts NP

## 2021-10-01 NOTE — ED INITIAL ASSESSMENT (HPI)
Pt states her doctor wanted her to be seen in the ER for an abnormal EKG. Pt denies chest pain, shortness of breath, or dizziness. She states she is anxious.

## 2021-10-01 NOTE — H&P
[address] Patient Status:  Observation    1940 MRN [id number]   Location [facility] 3W/SW Attending Davon Brown MD   Hosp Day # 0 PCP Ashley Urena MD     Date:  10/1/2021  Date Reported? Taking? Ascorbic Acid (VITAMIN C) 100 MG Oral Tab   Yes No   Sig: Take 100 mg by mouth daily. Cholecalciferol (VITAMIN D3) 25 MCG (1000 UT) Oral Cap   Yes No   Sig: Take 1 tablet by mouth daily.    LISINOPRIL-HYDROCHLOROTHIAZIDE 20-25 MG Oral equal, symmetrical chest wall expansion. Cardiovascular:  Normal rate, regular rhythm, no murmur, no edema. Gastrointestinal:  Soft, non-tender, non-distended, normal bowel sounds, no organomegaly. Lymphatics:  No lymphadenopathy neck, axilla, groin.   Dandy Samples

## 2021-10-01 NOTE — ED QUICK NOTES
Orders for admission, patient is aware of plan and ready to go upstairs. Any questions, please call ED RN Pradip Freedman  at extension [phone].    Type of COVID test sent:Rapid  COVID Suspicion level: Low    Titratable drug(s) infusing:n/a  Rate:    LOC at time of tr

## 2021-10-01 NOTE — CONSULTS
[facility]  Cardiology Consultation    Norma Obrien Patient Status:  Observation    1940 MRN [id number]   Location [facility] 3W/SW Attending Windy Moya MD   Hosp Day # 0 PCP Rae Christian MD     Reason for Consultat Daily  •  metoprolol tartrate (LOPRESSOR) tab 50 mg, 50 mg, Oral, 2x Daily(Beta Blocker)    Review of Systems:  A comprehensive review of systems was negative if not otherwise mention in above HPI.     /75 (BP Location: Right arm)   Pulse 72   Temp 98 fibrillation  TIA/CVA  Hypertension    Recommendations:  Increase metoprolol to 50 mg twice daily.   Continue lisinopril/HCTZ  2D echocardiogram reviewed normal EF  Risk benefits alternatives of Coumadin versus Eliquis/Xarelto discussed with patient at [facility]

## 2021-10-01 NOTE — PLAN OF CARE
Patient had echo and ultrasound done this morning. EF 58%. Ultrasound clear. MRI to be completed today - if clear, patient will be discharged (probable discharge tomorrow). Social work to consult on pricing of Eliquis versus Xaralto versus Coumadin.  Huan of decreased coronary artery perfusion - ex.  Angina  - Evaluate fluid balance, assess for edema, trend weights  Outcome: Progressing  Goal: Absence of cardiac arrhythmias or at baseline  Description: INTERVENTIONS:  - Continuous cardiac monitoring, monitor

## 2021-10-01 NOTE — PLAN OF CARE
Patient is alert, RA, NS on tele, and ambulates independently. C/o blurry vision in left eye and double vision at times. SubQ hep for DVT prophylaxis. CXR results pending. Potassium replaced in ER. No complaints of chest pain or shortness of breath.  Plan

## 2021-10-01 NOTE — CM/SW NOTE
Patient does not have per scription drug coverage ( Medicare Part D) and has been paying cash for all her medications.)  Information provided to patient and daughter at the bedside for how to apply for  Medicar Part D.      SW/CM to remain available for sup

## 2021-10-01 NOTE — ED PROVIDER NOTES
Patient Seen in: [facility] AND [facility] Emergency Department      History   No chief complaint on file.     Stated Complaint: abnormal EKG in the office, needs labs    Subjective:   HPI  Patient is an 70-year-old female history of hypertension presenting with is not in acute distress. Appearance: Normal appearance. She is not toxic-appearing. HENT:      Head: Normocephalic. Mouth/Throat:      Mouth: Mucous membranes are moist.   Eyes:      Extraocular Movements: Extraocular movements intact.       Con ---------                               -----------         ------                     CBC W/ DIFFERENTIAL[[id number]]                              Final result                 Please view results for these tests on the individual orders.    JUSTIN MCKENZIE pressure.       Medications Prescribed:  Current Discharge Medication List                          Hospital Problems             Present on Admission  Date Reviewed: 9/30/2021          ICD-10-CM Noted POA    * (Principal) New onset a-fib [facility]) I48.91 9/30/

## 2021-10-01 NOTE — PROGRESS NOTES
HPI:    Patient ID: Federica Fine is a 80year old female. Patient presents with:  Eye Problem: C/o double and blurry vision for about 2 weeks.   Hypertension:  webp check,  Denies any recent COVID exposure, nausea/vomiting, cough, or fever/chills    Patient peripheral edema, PND, shortness of breath or sweats. Risk factors for coronary artery disease include post-menopausal state and stress. Past treatments include lifestyle changes, ACE inhibitors, beta blockers, calcium channel blockers and diuretics.  The nathan tablet by mouth once daily 90 tablet 0   • METOPROLOL TARTRATE 25 MG Oral Tab Take 1 tablet by mouth once daily (Patient taking differently: Take 12.5 mg by mouth daily.) 90 tablet 0   • Ascorbic Acid (VITAMIN C) 100 MG Oral Tab Take 100 mg by mouth daily. pressure 126/82, pulse 92, height 5' 1\" (1.549 m), weight 125 lb (56.7 kg), SpO2 98 %.              ASSESSMENT/PLAN:   Essential hypertension  (primary encounter diagnosis)      Take high blood pressure medication as perscribed   Low- sodium diet   Ruth possibly had pneumonia shots with [facility] or [place] in the past she believes she also got the shingles shot   Shingrix she will discuss with pharmacist bring records next visit time  Orders Placed This Encounter      CBC With Differential With Platelet

## 2021-10-01 NOTE — PROGRESS NOTES
[facility]    Progress Note    [facility] Meghan Patient Status:  Observation    1940 MRN [id number]   Location [facility] 3W/SW Attending Roger Clarke MD   Hosp Day # 0 PCP Bhavana Vora MD     Chief Complaint: Afib    S rhythm is noted.    Dictated by (CST): Jayy Fonseca MD on 10/01/2021 at 8:50 AM     Finalized by (DEREK): Jayy Fonseca MD on 10/01/2021 at 8:56 AM            Meds:   rOPINIRole (REQUIP) tab 0.25 mg, 0.25 mg, Oral, Daily  aspirin EC tab 81 mg, 81

## 2021-10-01 NOTE — TELEPHONE ENCOUNTER
Message # [phone]         2021 07:32p   [DANTEM]  To:  From:  NACHO Jaffe MD:  Phone#:  ----------------------------------------------------------------------  [address],  40, RE; PT IS RETURNING CALL    Paged at number :  PA

## 2021-10-02 ENCOUNTER — APPOINTMENT (OUTPATIENT)
Dept: MRI IMAGING | Facility: HOSPITAL | Age: 81
End: 2021-10-02
Attending: INTERNAL MEDICINE
Payer: MEDICARE

## 2021-10-02 VITALS
HEIGHT: 61 IN | OXYGEN SATURATION: 99 % | RESPIRATION RATE: 16 BRPM | WEIGHT: 120.81 LBS | DIASTOLIC BLOOD PRESSURE: 86 MMHG | SYSTOLIC BLOOD PRESSURE: 125 MMHG | HEART RATE: 70 BPM | TEMPERATURE: 98 F | BODY MASS INDEX: 22.81 KG/M2

## 2021-10-02 PROCEDURE — 99217 OBSERVATION CARE DISCHARGE: CPT | Performed by: HOSPITALIST

## 2021-10-02 PROCEDURE — 70553 MRI BRAIN STEM W/O & W/DYE: CPT | Performed by: INTERNAL MEDICINE

## 2021-10-02 NOTE — PLAN OF CARE
NSR to SB overnight. Lowest HR 34 - asymptomatic. Patient states she slept much better with Tylenol. Plan is to go for MRI. Will DC home with Xarelto.     Problem: Patient Centered Care  Goal: Patient preferences are identified and integrated in the patient cardiac arrhythmias or at baseline  Description: INTERVENTIONS:  - Continuous cardiac monitoring, monitor vital signs, obtain 12 lead EKG if indicated  - Evaluate effectiveness of antiarrhythmic and heart rate control medications as ordered  - Initiate haja

## 2021-10-02 NOTE — DISCHARGE SUMMARY
[facility] - [facility]    Discharge Summary    Ree Donahue Patient Status:  Observation    1940 MRN [id number]   Location [facility] 3W/SW Attending Julissa Mckeon MD   [facility] Day # 0 PCP Marquis Coy MD     Date of Admission: 9/3 lightheaded usually when she gets up out of bed early in the morning improved somewhat after drinking a few glasses of juice and water however claims she was feeling more lightheaded than usual and later on found herself on the floor without any injuries, by mouth daily. Quantity: 90 tablet  Refills: 0     vitamin C 100 MG Tabs      Take 100 mg by mouth daily. Refills: 0     Vitamin D3 25 MCG (1000 UT) Caps      Take 1 tablet by mouth daily.    Refills: 0        STOP taking these medications    amLODIPin

## 2021-10-02 NOTE — PLAN OF CARE
VSS. Denies chest pain at this time. [id number] Simran Obando for discharge. Discharge instructions given.   Problem: CARDIOVASCULAR - ADULT  Goal: Maintains optimal cardiac output and hemodynamic stability  Description: INTERVENTIONS:  - Monitor vital signs, rhythm, and trends

## 2021-10-04 ENCOUNTER — TELEPHONE (OUTPATIENT)
Dept: INTERNAL MEDICINE UNIT | Facility: HOSPITAL | Age: 81
End: 2021-10-04

## 2021-10-04 ENCOUNTER — TELEPHONE (OUTPATIENT)
Dept: OPHTHALMOLOGY | Facility: CLINIC | Age: 81
End: 2021-10-04

## 2021-10-04 ENCOUNTER — PATIENT OUTREACH (OUTPATIENT)
Dept: CASE MANAGEMENT | Age: 81
End: 2021-10-04

## 2021-10-04 DIAGNOSIS — Z02.9 ENCOUNTERS FOR UNSPECIFIED ADMINISTRATIVE PURPOSE: ICD-10-CM

## 2021-10-04 PROCEDURE — 1111F DSCHRG MED/CURRENT MED MERGE: CPT

## 2021-10-04 NOTE — TELEPHONE ENCOUNTER
Call made to [address] to follow up on status of Xarelto, Pharmacisit states Coupon was not working over the weekend, she was unsure why, but coupon was working now and patient could  the medication at no charge.  Prescription would be ready f

## 2021-10-04 NOTE — PROGRESS NOTES
Initial Post Discharge Follow Up   Discharge Date: 10/2/21  Contact Date: 10/4/2021    Consent Verification:  Assessment Completed With: Patient  HIPAA Verified?   Yes    Discharge Dx:     New onset atrial fibrillation  Syncope; carotid Dopplers with no these medications      amLODIPine 5 MG Tabs  Commonly known as: NORVASC       Current Outpatient Medications   Medication Sig Dispense Refill   • metoprolol tartrate 50 MG Oral Tab Take 1 tablet (50 mg total) by mouth 2x Daily(Beta Blocker).  60 tablet 5 Referrals/orders at D/C:  Home Health/Services ordered at D/C? No     Except for [facility] mentioned above, have you scheduled these other services?    no        DME ordered at D/C? No         SDOH:  Transportation:  Transportation Needs: All medications were reviewed and educated on the importance of taking the medications as directed. Patient symptoms were assessed, education was completed for signs/symptoms to monitor, and reviewed when to contact providers vs go to the ED/call 911.  Abdulaziz

## 2021-10-04 NOTE — TELEPHONE ENCOUNTER
Per daughter pt has been having blurred vision in the left eye for the past 2 weeks, asking for appt soon, states MRI was done to rule out vision due to stroke, but was negative. Please call thank you.

## 2021-10-04 NOTE — TELEPHONE ENCOUNTER
Spoke to pt's daughter Steve Slice) and daughter states mom has been complaining of blurry vision in the right eye that could also be \"double vision\".  Daughter states pt had an MRI done which came back negative and denies pt having had any trauma to the head

## 2021-10-08 DIAGNOSIS — I10 ESSENTIAL HYPERTENSION: ICD-10-CM

## 2021-10-08 RX ORDER — LISINOPRIL AND HYDROCHLOROTHIAZIDE 25; 20 MG/1; MG/1
1 TABLET ORAL DAILY
Qty: 90 TABLET | Refills: 1 | Status: SHIPPED | OUTPATIENT
Start: 2021-10-08

## 2021-10-08 NOTE — TELEPHONE ENCOUNTER
Refill passed per [facility], [facility] protocol.   Requested Prescriptions   Pending Prescriptions Disp Refills    LISINOPRIL-HYDROCHLOROTHIAZIDE 20-25 MG Oral Tab [Pharmacy Med Name: Lisinopril-hydroCHLOROthiazide 20-25 MG Oral Tablet] 90 tablet 0     Sig: Take

## 2021-10-12 ENCOUNTER — OFFICE VISIT (OUTPATIENT)
Dept: OPHTHALMOLOGY | Facility: CLINIC | Age: 81
End: 2021-10-12
Payer: MEDICARE

## 2021-10-12 DIAGNOSIS — H25.13 AGE-RELATED NUCLEAR CATARACT OF BOTH EYES: Primary | ICD-10-CM

## 2021-10-12 DIAGNOSIS — H53.2 DOUBLE VISION: ICD-10-CM

## 2021-10-12 DIAGNOSIS — H43.393 FLOATERS IN VISUAL FIELD, BILATERAL: ICD-10-CM

## 2021-10-12 PROCEDURE — 92015 DETERMINE REFRACTIVE STATE: CPT | Performed by: OPHTHALMOLOGY

## 2021-10-12 PROCEDURE — 92004 COMPRE OPH EXAM NEW PT 1/>: CPT | Performed by: OPHTHALMOLOGY

## 2021-10-12 NOTE — PROGRESS NOTES
Matt Ceron is a 80year old female. HPI:     HPI     Consult      Additional comments: Per Dr. Jose Mcgill              Comments     NP.  Pt in today for a complete eye exam. Pt's last eye exam was over 30 years ago and occasionally uses reading glasses Lisinopril-hydroCHLOROthiazide 20-25 MG Oral Tab Take 1 tablet by mouth daily. 90 tablet 1   • metoprolol tartrate 50 MG Oral Tab Take 1 tablet (50 mg total) by mouth 2x Daily(Beta Blocker).  60 tablet 5   • Rivaroxaban 20 MG Oral Tab Take 1 tablet (20 mg t dysfunction    Conjunctiva/Sclera Temp pinguecula Temp pinguecula    Cornea Clear Clear    Anterior Chamber Deep and quiet Deep and quiet    Iris Normal Normal    Lens 1+ Nuclear sclerosis 1+ Nuclear sclerosis    Vitreous Vitreous floaters Vitreous floater Venkata. .    10/12/2021  Scribed by: Jerome Scott MD

## 2021-10-12 NOTE — PATIENT INSTRUCTIONS
Age-related nuclear cataract of both eyes  Discussed early cataracts with patient. Told patient that cataracts are age appropriate and they are not surgical at this time. No treatment recommended at this time.      Double vision  Follow up with Dr. Adrienne Mann

## 2021-10-21 ENCOUNTER — OFFICE VISIT (OUTPATIENT)
Dept: INTERNAL MEDICINE CLINIC | Facility: CLINIC | Age: 81
End: 2021-10-21
Payer: MEDICARE

## 2021-10-21 VITALS
HEART RATE: 61 BPM | SYSTOLIC BLOOD PRESSURE: 139 MMHG | BODY MASS INDEX: 23.11 KG/M2 | DIASTOLIC BLOOD PRESSURE: 74 MMHG | WEIGHT: 122.38 LBS | HEIGHT: 61 IN

## 2021-10-21 DIAGNOSIS — G25.81 RESTLESS LEGS SYNDROME: ICD-10-CM

## 2021-10-21 DIAGNOSIS — R94.4 DECREASED GFR: ICD-10-CM

## 2021-10-21 DIAGNOSIS — I48.91 NEW ONSET A-FIB (HCC): ICD-10-CM

## 2021-10-21 DIAGNOSIS — I10 ESSENTIAL HYPERTENSION: Primary | ICD-10-CM

## 2021-10-21 PROCEDURE — G0008 ADMIN INFLUENZA VIRUS VAC: HCPCS | Performed by: INTERNAL MEDICINE

## 2021-10-21 PROCEDURE — 99214 OFFICE O/P EST MOD 30 MIN: CPT | Performed by: INTERNAL MEDICINE

## 2021-10-21 PROCEDURE — 90662 IIV NO PRSV INCREASED AG IM: CPT | Performed by: INTERNAL MEDICINE

## 2021-10-21 RX ORDER — FAMOTIDINE 20 MG/1
20 TABLET ORAL 2 TIMES DAILY
Qty: 60 TABLET | Refills: 2 | Status: SHIPPED | OUTPATIENT
Start: 2021-10-21 | End: 2022-01-21

## 2021-10-21 NOTE — PROGRESS NOTES
HPI:    Patient ID: Artemio Tolentino is a 80year old female. Patient presents with:  Hospital F/U    Patient in office today for post hospital visit  due to new  A fib .   Patient states she is feeling much better denies any palpitations or chest pain shortnes swelling and PND. Gastrointestinal: Negative for abdominal pain, constipation, diarrhea, nausea and vomiting. Genitourinary: Negative for dysuria and frequency. Musculoskeletal: Negative for neck pain. Skin: Negative for pallor.    Neurological: Neg No discharge. No erythema, no tenderness   Neck:      Thyroid: No thyromegaly. Vascular: No JVD. Cardiovascular:      Rate and Rhythm: irregular rhythm. Heart sounds: Normal heart sounds. No murmur heard.       Pulmonary:      Effort: Pulmonary indigestion  Recommend to avoid NSAIDs take baby aspirin with food    Pepcid 20 mg twice daily 30 minutes before breakfast and dinner  Patient agrees with plan verbalized understanding compliance      Decreased  GFR-resolved now we will monitor      Avoidi

## 2021-11-01 RX ORDER — RIVAROXABAN 20 MG/1
TABLET, FILM COATED ORAL
Qty: 30 TABLET | Refills: 0 | OUTPATIENT
Start: 2021-11-01

## 2021-11-03 ENCOUNTER — TELEPHONE (OUTPATIENT)
Dept: CARDIOLOGY CLINIC | Facility: CLINIC | Age: 81
End: 2021-11-03

## 2021-11-03 NOTE — TELEPHONE ENCOUNTER
Current Outpatient Medications:   •  •  Rivaroxaban 20 MG Oral Tab, Take 1 tablet (20 mg total) by mouth nightly., Disp: 30 tablet, Rfl: 0  •

## 2021-11-04 ENCOUNTER — TELEPHONE (OUTPATIENT)
Dept: INTERNAL MEDICINE CLINIC | Facility: CLINIC | Age: 81
End: 2021-11-04

## 2021-11-04 NOTE — TELEPHONE ENCOUNTER
Rec'd [address] form and obtained MD's signature. Faxed to [employer] at [phone]. Rec'd fax confirmation.

## 2021-11-10 ENCOUNTER — MED REC SCAN ONLY (OUTPATIENT)
Dept: INTERNAL MEDICINE CLINIC | Facility: CLINIC | Age: 81
End: 2021-11-10

## 2021-11-15 ENCOUNTER — OFFICE VISIT (OUTPATIENT)
Dept: OPHTHALMOLOGY | Facility: CLINIC | Age: 81
End: 2021-11-15
Payer: MEDICARE

## 2021-11-15 DIAGNOSIS — H52.4 PRESBYOPIA OF BOTH EYES: ICD-10-CM

## 2021-11-15 DIAGNOSIS — H49.12 LEFT SUPERIOR OBLIQUE PALSY: Primary | ICD-10-CM

## 2021-11-15 DIAGNOSIS — H49.12 FOURTH (TROCHLEAR) NERVE PALSY, LEFT EYE: ICD-10-CM

## 2021-11-15 DIAGNOSIS — H52.03 HYPEROPIA OF BOTH EYES WITH ASTIGMATISM: ICD-10-CM

## 2021-11-15 DIAGNOSIS — H52.203 HYPEROPIA OF BOTH EYES WITH ASTIGMATISM: ICD-10-CM

## 2021-11-15 DIAGNOSIS — H53.2 VERTICAL DIPLOPIA: ICD-10-CM

## 2021-11-15 DIAGNOSIS — H25.13 AGE-RELATED NUCLEAR CATARACT OF BOTH EYES: ICD-10-CM

## 2021-11-15 PROCEDURE — 92060 SENSORIMOTOR EXAMINATION: CPT | Performed by: OPHTHALMOLOGY

## 2021-11-15 PROCEDURE — 99214 OFFICE O/P EST MOD 30 MIN: CPT | Performed by: OPHTHALMOLOGY

## 2021-11-15 NOTE — PATIENT INSTRUCTIONS
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 We will treat with fresnel prism and glasses. We added 6 base down left eye fresnel prism applied to glasses   Return back in 4-6 weeks. Pt had an MRI on 10/2/21   CONCLUSION:   1.  Cerebral an

## 2021-11-15 NOTE — ASSESSMENT & PLAN NOTE
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 History of vertical diplopia x last 2 months. MRI showed microvascular ischemic changes. Patient has history of Hypertension. We will treat with fresnel prism applied glasses.  We added 6 base down left eye fresnel prism

## 2021-11-16 NOTE — ASSESSMENT & PLAN NOTE
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 MRI showed microvascular ischemic changes. History of Hypertension and recent atrial fib.

## 2021-11-16 NOTE — PROGRESS NOTES
William Sun is a 80year old female. HPI:     HPI     NP to [facility]. LDE 10/12/21 with [facility]; Hx of cataracts, floaters and vertical double vision at distance and near x 3 months all day long.  Pt was Rxd new glasses from [facility] and updated them last month in [place]c Daily(Beta Blocker). 60 tablet 5   • Rivaroxaban 20 MG Oral Tab Take 1 tablet (20 mg total) by mouth nightly.  30 tablet 0   • Omega-3 Fatty Acids (FISH OIL OR) Take 1 teaspoon by mouth daily     • rOPINIRole 0.25 MG Oral Tab Take 1 tablet (0.25 mg total) b Exam       Right Left    Lids/Lashes Dermatochalasis, Meibomian gland dysfunction Dermatochalasis, Meibomian gland dysfunction    Conjunctiva/Sclera Temp pinguecula Temp pinguecula    Cornea Clear Clear    Anterior Chamber Deep and quiet Deep and quiet cataract of both eyes  Mild, no treatment. Presbyopia of both eyes  Continue new glasses. No orders of the defined types were placed in this encounter.       Meds This Visit:  Requested Prescriptions      No prescriptions requested or ordered in thi

## 2021-11-22 RX ORDER — ROPINIROLE 0.25 MG/1
0.25 TABLET, FILM COATED ORAL DAILY
Qty: 90 TABLET | Refills: 0 | Status: SHIPPED | OUTPATIENT
Start: 2021-11-22 | End: 2022-02-17

## 2021-11-22 NOTE — TELEPHONE ENCOUNTER
Refill passed per [facility], [facility] protocol.     Requested Prescriptions   Pending Prescriptions Disp Refills    ROPINIROLE 0.25 MG Oral Tab [Pharmacy Med Name: rOPINIRole HCl 0.25 MG Oral Tablet] 90 tablet 0     Sig: Take 1 tablet by mouth once daily        Neurology Medications Passed - 11/22/2021  2:51 PM        Passed - Appointment in the past 6 or next 3 months              Future Appointments         Provider Department Appt Notes    In 1 month Linda Peralta, [address] [place] [address] Ophthalmology recheck prism             Recent Outpatient Visits              1 week ago Left superior oblique palsy    [facility] Ped Ophthalmology Linda Peralta MD    Office Visit    1 month ago Essential hypertension    [facility], [facility], Donis BROOKE Seltjarnarnes, MD    Office Visit    1 month ago Age-related nuclear cataract of both eyes    [facility] Ophthalmology Mer Blevins MD    Office Visit    1 month ago Heart palpitations    Randall Kline MD    Office Visit    4 months ago Seborrheic keratoses    [facility] Dermatology Donna Campbell [employer]    Office Visit

## 2021-12-22 ENCOUNTER — TELEPHONE (OUTPATIENT)
Dept: INTERNAL MEDICINE CLINIC | Facility: CLINIC | Age: 81
End: 2021-12-22

## 2021-12-22 NOTE — TELEPHONE ENCOUNTER
LAURA.  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  When pt calls back pls schedule her for a medicare physical (40 min appt).

## 2022-01-03 ENCOUNTER — OFFICE VISIT (OUTPATIENT)
Dept: OPHTHALMOLOGY | Facility: CLINIC | Age: 82
End: 2022-01-03
Payer: MEDICARE

## 2022-01-03 DIAGNOSIS — H52.4 PRESBYOPIA OF BOTH EYES: ICD-10-CM

## 2022-01-03 DIAGNOSIS — H49.12 LEFT SUPERIOR OBLIQUE PALSY: Primary | ICD-10-CM

## 2022-01-03 DIAGNOSIS — H43.393 FLOATERS IN VISUAL FIELD, BILATERAL: ICD-10-CM

## 2022-01-03 DIAGNOSIS — H25.13 AGE-RELATED NUCLEAR CATARACT OF BOTH EYES: ICD-10-CM

## 2022-01-03 DIAGNOSIS — H53.2 VERTICAL DIPLOPIA: ICD-10-CM

## 2022-01-03 PROCEDURE — 99213 OFFICE O/P EST LOW 20 MIN: CPT | Performed by: OPHTHALMOLOGY

## 2022-01-03 PROCEDURE — 92060 SENSORIMOTOR EXAMINATION: CPT | Performed by: OPHTHALMOLOGY

## 2022-01-03 NOTE — ASSESSMENT & PLAN NOTE
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 History of vertical diplopia (onset September 2021) MRI showed microvascular ischemic changes. Patient has history of Hypertension. We will treat with fresnel prism applied glasses.    We increased fresnel prism from 6 to

## 2022-01-03 NOTE — PATIENT INSTRUCTIONS
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 Patient has history of Hypertension. We will treat with fresnel prism applied glasses.    We incr

## 2022-01-03 NOTE — PROGRESS NOTES
Rivera Stone is a 80year old female. HPI:     HPI     EP/ 80year old here for a recheck prism.    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

## 2022-01-04 ENCOUNTER — LAB ENCOUNTER (OUTPATIENT)
Dept: LAB | Age: 82
End: 2022-01-04
Attending: INTERNAL MEDICINE
Payer: MEDICARE

## 2022-01-04 DIAGNOSIS — I10 ESSENTIAL HYPERTENSION: ICD-10-CM

## 2022-01-04 DIAGNOSIS — R94.4 DECREASED GFR: ICD-10-CM

## 2022-01-04 LAB
ALBUMIN SERPL-MCNC: 3.5 G/DL (ref 3.4–5)
ALBUMIN/GLOB SERPL: 1.2 {RATIO} (ref 1–2)
ALP LIVER SERPL-CCNC: 79 U/L
ALT SERPL-CCNC: 28 U/L
ANION GAP SERPL CALC-SCNC: 8 MMOL/L (ref 0–18)
AST SERPL-CCNC: 20 U/L (ref 15–37)
BASOPHILS # BLD AUTO: 0.09 X10(3) UL (ref 0–0.2)
BASOPHILS NFR BLD AUTO: 1.2 %
BILIRUB SERPL-MCNC: 0.8 MG/DL (ref 0.1–2)
BUN BLD-MCNC: 16 MG/DL (ref 7–18)
BUN/CREAT SERPL: 17.6 (ref 10–20)
CALCIUM BLD-MCNC: 9.6 MG/DL (ref 8.5–10.1)
CHLORIDE SERPL-SCNC: 101 MMOL/L (ref 98–112)
CHOLEST SERPL-MCNC: 161 MG/DL (ref ?–200)
CO2 SERPL-SCNC: 30 MMOL/L (ref 21–32)
CREAT BLD-MCNC: 0.91 MG/DL
DEPRECATED RDW RBC AUTO: 43.3 FL (ref 35.1–46.3)
EOSINOPHIL # BLD AUTO: 0.33 X10(3) UL (ref 0–0.7)
EOSINOPHIL NFR BLD AUTO: 4.2 %
ERYTHROCYTE [DISTWIDTH] IN BLOOD BY AUTOMATED COUNT: 13.5 % (ref 11–15)
FASTING PATIENT LIPID ANSWER: YES
FASTING STATUS PATIENT QL REPORTED: YES
GLOBULIN PLAS-MCNC: 3 G/DL (ref 2.8–4.4)
GLUCOSE BLD-MCNC: 102 MG/DL (ref 70–99)
HCT VFR BLD AUTO: 39.7 %
HDLC SERPL-MCNC: 51 MG/DL (ref 40–59)
HGB BLD-MCNC: 13 G/DL
IMM GRANULOCYTES # BLD AUTO: 0.02 X10(3) UL (ref 0–1)
IMM GRANULOCYTES NFR BLD: 0.3 %
LDLC SERPL CALC-MCNC: 94 MG/DL (ref ?–100)
LYMPHOCYTES # BLD AUTO: 2.76 X10(3) UL (ref 1–4)
LYMPHOCYTES NFR BLD AUTO: 35.5 %
MCH RBC QN AUTO: 28.7 PG (ref 26–34)
MCHC RBC AUTO-ENTMCNC: 32.7 G/DL (ref 31–37)
MCV RBC AUTO: 87.6 FL
MONOCYTES # BLD AUTO: 0.54 X10(3) UL (ref 0.1–1)
MONOCYTES NFR BLD AUTO: 6.9 %
NEUTROPHILS # BLD AUTO: 4.03 X10 (3) UL (ref 1.5–7.7)
NEUTROPHILS # BLD AUTO: 4.03 X10(3) UL (ref 1.5–7.7)
NEUTROPHILS NFR BLD AUTO: 51.9 %
NONHDLC SERPL-MCNC: 110 MG/DL (ref ?–130)
OSMOLALITY SERPL CALC.SUM OF ELEC: 289 MOSM/KG (ref 275–295)
PLATELET # BLD AUTO: 278 10(3)UL (ref 150–450)
POTASSIUM SERPL-SCNC: 3.6 MMOL/L (ref 3.5–5.1)
PROT SERPL-MCNC: 6.5 G/DL (ref 6.4–8.2)
RBC # BLD AUTO: 4.53 X10(6)UL
SODIUM SERPL-SCNC: 139 MMOL/L (ref 136–145)
TRIGL SERPL-MCNC: 86 MG/DL (ref 30–149)
VLDLC SERPL CALC-MCNC: 14 MG/DL (ref 0–30)
WBC # BLD AUTO: 7.8 X10(3) UL (ref 4–11)

## 2022-01-04 PROCEDURE — 85025 COMPLETE CBC W/AUTO DIFF WBC: CPT

## 2022-01-04 PROCEDURE — 80061 LIPID PANEL: CPT

## 2022-01-04 PROCEDURE — 36415 COLL VENOUS BLD VENIPUNCTURE: CPT

## 2022-01-04 PROCEDURE — 80053 COMPREHEN METABOLIC PANEL: CPT

## 2022-01-13 ENCOUNTER — OFFICE VISIT (OUTPATIENT)
Dept: INTERNAL MEDICINE CLINIC | Facility: CLINIC | Age: 82
End: 2022-01-13
Payer: MEDICARE

## 2022-01-13 VITALS
OXYGEN SATURATION: 97 % | HEART RATE: 59 BPM | WEIGHT: 124 LBS | DIASTOLIC BLOOD PRESSURE: 74 MMHG | BODY MASS INDEX: 23.41 KG/M2 | SYSTOLIC BLOOD PRESSURE: 148 MMHG | HEIGHT: 61 IN

## 2022-01-13 DIAGNOSIS — R20.2 TINGLING OF SKIN: ICD-10-CM

## 2022-01-13 DIAGNOSIS — I48.91 NEW ONSET A-FIB (HCC): ICD-10-CM

## 2022-01-13 DIAGNOSIS — M79.2 NEURALGIA: ICD-10-CM

## 2022-01-13 DIAGNOSIS — I10 ESSENTIAL HYPERTENSION WITH GOAL BLOOD PRESSURE LESS THAN 130/80: Primary | ICD-10-CM

## 2022-01-13 DIAGNOSIS — R94.4 DECREASED GFR: ICD-10-CM

## 2022-01-13 PROCEDURE — 99214 OFFICE O/P EST MOD 30 MIN: CPT | Performed by: INTERNAL MEDICINE

## 2022-01-13 PROCEDURE — 3008F BODY MASS INDEX DOCD: CPT | Performed by: INTERNAL MEDICINE

## 2022-01-13 PROCEDURE — 3078F DIAST BP <80 MM HG: CPT | Performed by: INTERNAL MEDICINE

## 2022-01-13 PROCEDURE — 3077F SYST BP >= 140 MM HG: CPT | Performed by: INTERNAL MEDICINE

## 2022-01-13 NOTE — PROGRESS NOTES
HPI:    Patient ID: Celina Mcmullen is a 80year old female. Patient presents with:  Hypertension  Patient presents with:  Hypertension: Recent labs completed on 1/4/22  Eye Problem: C/o left eye sensation  for a long time.     Patient in office today HTN  , A PND.   Gastrointestinal: Negative for abdominal pain, constipation, diarrhea, nausea and vomiting. Genitourinary: Negative for dysuria and frequency. Musculoskeletal: Negative for neck pain. Skin: Negative for pallor.    Neurological: Negative for diz thyromegaly. Vascular: No JVD. Cardiovascular:      Rate and Rhythm: irregular rhythm. Heart sounds: Normal heart sounds. No murmur heard. Pulmonary:      Effort: Pulmonary effort is normal. No respiratory distress.       Breath sounds: Nor very quick  Patient seen ophthalmologist with diagnosis below  Pt prefers to see Neurologist         Peptic ulcer disease prophylaxis   patient has some mild indigestion  Recommend to avoid NSAIDs take baby aspirin with food  Famotidine  20 mg twice daily Referrals:  NEURO - INTERNAL       [id number]

## 2022-01-17 ENCOUNTER — TELEPHONE (OUTPATIENT)
Dept: INTERNAL MEDICINE CLINIC | Facility: CLINIC | Age: 82
End: 2022-01-17

## 2022-01-17 NOTE — TELEPHONE ENCOUNTER
Pt. States that  was supposed to send a Rx for a new medication for pain in L eye and above the eye, but [facility] in [facility] on [address], has not received Rx from our office. Pt. Would like to get a call back to discuss.

## 2022-01-17 NOTE — TELEPHONE ENCOUNTER
Patient states she does not need medicine for her eye. Janina Gonzalez gave her a referral to neurologist for her eye. She now found the paper work and she will call for an appointment.

## 2022-01-21 RX ORDER — FAMOTIDINE 20 MG/1
20 TABLET, FILM COATED ORAL
Qty: 180 TABLET | Refills: 1 | Status: SHIPPED | OUTPATIENT
Start: 2022-01-21 | End: 2022-07-16

## 2022-01-21 NOTE — TELEPHONE ENCOUNTER
Refill passed per [facility] protocol.      Requested Prescriptions   Pending Prescriptions Disp Refills    FAMOTIDINE 20 MG Oral Tab [Pharmacy Med Name: FAMOTIDINE 20MG     TAB] 60 tablet 0     Sig: TAKE 1 TABLET BY MOUTH TWICE DAILY 30  MINUTES  BEFORE  MEALS  IN  THE  MORNING  AND  IN  THE  EVENING        Gastrointestional Medication Protocol Passed - 1/21/2022  2:14 PM        Passed - Appointment in past 12 or next 3 months                Recent Outpatient Visits              1 week ago Essential hypertension with goal blood pressure less than 130/80    [facility], [address], [place], Seltjarnarnes, MD    Office Visit    2 weeks ago Left superior oblique palsy    [facility] Ped Ophthalmology Gracie Ward MD    Office Visit    2 months ago Left superior oblique palsy    [facility] Ped Ophthalmology Gracie Ward MD    Office Visit    3 months ago Essential hypertension    [facility], [address] MD    Office Visit    3 months ago Age-related nuclear cataract of both eyes    [facility] Ophthalmology Radha Saleh MD    Office Visit             Future Appointments         Provider Department Appt Notes    In 1 week MD Safia Melendez by Dr. Darell Terrell and Head pain(Neuralgia)-Medicare    In 2 months Gracie Ward, [address] [place] [address] Ped Ophthalmology Return in about 3 months (around 4/3/2022) for recheck prism

## 2022-02-03 ENCOUNTER — LAB ENCOUNTER (OUTPATIENT)
Dept: LAB | Facility: HOSPITAL | Age: 82
End: 2022-02-03
Attending: Other
Payer: MEDICARE

## 2022-02-03 ENCOUNTER — OFFICE VISIT (OUTPATIENT)
Dept: NEUROLOGY | Facility: CLINIC | Age: 82
End: 2022-02-03
Payer: MEDICARE

## 2022-02-03 VITALS
DIASTOLIC BLOOD PRESSURE: 70 MMHG | SYSTOLIC BLOOD PRESSURE: 164 MMHG | WEIGHT: 124 LBS | BODY MASS INDEX: 23.41 KG/M2 | HEIGHT: 61 IN | HEART RATE: 62 BPM

## 2022-02-03 DIAGNOSIS — G25.81 RLS (RESTLESS LEGS SYNDROME): Primary | ICD-10-CM

## 2022-02-03 DIAGNOSIS — H53.2 DIPLOPIA: ICD-10-CM

## 2022-02-03 DIAGNOSIS — R20.2 PARESTHESIA: ICD-10-CM

## 2022-02-03 PROCEDURE — 3078F DIAST BP <80 MM HG: CPT | Performed by: OTHER

## 2022-02-03 PROCEDURE — 84238 ASSAY NONENDOCRINE RECEPTOR: CPT

## 2022-02-03 PROCEDURE — 99204 OFFICE O/P NEW MOD 45 MIN: CPT | Performed by: OTHER

## 2022-02-03 PROCEDURE — 83516 IMMUNOASSAY NONANTIBODY: CPT

## 2022-02-03 PROCEDURE — 36415 COLL VENOUS BLD VENIPUNCTURE: CPT

## 2022-02-03 PROCEDURE — 83519 RIA NONANTIBODY: CPT

## 2022-02-03 PROCEDURE — 3077F SYST BP >= 140 MM HG: CPT | Performed by: OTHER

## 2022-02-03 PROCEDURE — 86255 FLUORESCENT ANTIBODY SCREEN: CPT

## 2022-02-03 PROCEDURE — 3008F BODY MASS INDEX DOCD: CPT | Performed by: OTHER

## 2022-02-03 RX ORDER — GABAPENTIN 100 MG/1
CAPSULE ORAL
Qty: 210 CAPSULE | Refills: 1 | Status: SHIPPED | OUTPATIENT
Start: 2022-02-03

## 2022-02-08 ENCOUNTER — TELEPHONE (OUTPATIENT)
Dept: NEUROLOGY | Facility: CLINIC | Age: 82
End: 2022-02-08

## 2022-02-08 LAB
ACETYLCHOLINE BINDING AB: 0 NMOL/L
ACETYLCHOLINE BLOCKING AB: 12 %
TITIN ANTIBODY: <0.09 IV

## 2022-02-08 NOTE — TELEPHONE ENCOUNTER
----- Message from Chelo Reveles MD sent at 2/8/2022  8:24 AM CST -----  Please let the patient know that results of these particular lab tests so far were normal.    Thank you

## 2022-02-17 ENCOUNTER — TELEPHONE (OUTPATIENT)
Dept: NEUROLOGY | Facility: CLINIC | Age: 82
End: 2022-02-17

## 2022-02-17 ENCOUNTER — OFFICE VISIT (OUTPATIENT)
Dept: NEUROLOGY | Facility: CLINIC | Age: 82
End: 2022-02-17
Payer: MEDICARE

## 2022-02-17 VITALS
BODY MASS INDEX: 22.82 KG/M2 | HEIGHT: 62 IN | DIASTOLIC BLOOD PRESSURE: 74 MMHG | WEIGHT: 124 LBS | SYSTOLIC BLOOD PRESSURE: 142 MMHG

## 2022-02-17 DIAGNOSIS — G25.81 RLS (RESTLESS LEGS SYNDROME): Primary | ICD-10-CM

## 2022-02-17 DIAGNOSIS — G50.0 SUPRAORBITAL NEURALGIA: ICD-10-CM

## 2022-02-17 PROCEDURE — 64400 NJX AA&/STRD TRIGEMINAL NRV: CPT | Performed by: OTHER

## 2022-02-17 PROCEDURE — 3077F SYST BP >= 140 MM HG: CPT | Performed by: OTHER

## 2022-02-17 PROCEDURE — 3078F DIAST BP <80 MM HG: CPT | Performed by: OTHER

## 2022-02-17 PROCEDURE — 3008F BODY MASS INDEX DOCD: CPT | Performed by: OTHER

## 2022-02-17 PROCEDURE — 99213 OFFICE O/P EST LOW 20 MIN: CPT | Performed by: OTHER

## 2022-02-17 RX ORDER — TRIAMCINOLONE ACETONIDE 40 MG/ML
40 INJECTION, SUSPENSION INTRA-ARTICULAR; INTRAMUSCULAR ONCE
Status: COMPLETED | OUTPATIENT
Start: 2022-02-17 | End: 2022-02-17

## 2022-02-17 RX ORDER — LIDOCAINE HYDROCHLORIDE 10 MG/ML
2 INJECTION, SOLUTION INFILTRATION; PERINEURAL ONCE
Status: COMPLETED | OUTPATIENT
Start: 2022-02-17 | End: 2022-02-17

## 2022-02-17 NOTE — PROCEDURES
Procedure Note: Left supraorbital nerve Block    Indications:  Severe left supraorbital pain    Informed consent was obtained (explaining the procedure and risks and benefits of procedure) from patient:  the signed consent form was placed in the medical record. A time out was completed, verifying correct patient, procedure,site, positioning, and implants or special equipment. Patient's left supraorbital area was palpated to identify location of supraorbital nerve. Alcohol was applied topically to the skin. Using a 27 gauge needle (aspirating during insertion), 1 cc of a mixture of Kenalog, 2 cc of 1% lidocaine was injected on the left side (directing needle to center, left and right of painful focus). Pressure with a gauze pad was held briefly upon the site of puncture to minimize bleeding and to further spread anaesthetic subcutaneously. There were no complications. Patient was comfortable and left without complaint.

## 2022-02-17 NOTE — TELEPHONE ENCOUNTER
Supraorbital nerve block CPT CODE 43828    Status: APPROVED. Called [facility], spoke to [facility] who states authorization is required.   Request is approved     Authorization # [id number] valid 02/17/22 thru 04/17/22     Notified Approved referrals for scheduling

## 2022-03-30 RX ORDER — LISINOPRIL AND HYDROCHLOROTHIAZIDE 25; 20 MG/1; MG/1
TABLET ORAL
Qty: 90 TABLET | Refills: 0 | Status: SHIPPED | OUTPATIENT
Start: 2022-03-30

## 2022-04-06 RX ORDER — GABAPENTIN 100 MG/1
CAPSULE ORAL
Qty: 210 CAPSULE | Refills: 0 | Status: SHIPPED | OUTPATIENT
Start: 2022-04-06 | End: 2022-04-07

## 2022-04-06 NOTE — TELEPHONE ENCOUNTER
Medication: Gabapentin 100mg Cap    LOV: 2/17/22  NOV: 4/6/22    Last [place]: 2/3/22 (#210/1RF)

## 2022-04-07 ENCOUNTER — OFFICE VISIT (OUTPATIENT)
Dept: NEUROLOGY | Facility: CLINIC | Age: 82
End: 2022-04-07
Payer: MEDICARE

## 2022-04-07 VITALS — HEIGHT: 62 IN | BODY MASS INDEX: 22.82 KG/M2 | WEIGHT: 124 LBS

## 2022-04-07 DIAGNOSIS — R20.2 PARESTHESIA: ICD-10-CM

## 2022-04-07 DIAGNOSIS — G25.81 RLS (RESTLESS LEGS SYNDROME): ICD-10-CM

## 2022-04-07 DIAGNOSIS — G50.0 SUPRAORBITAL NEURALGIA: ICD-10-CM

## 2022-04-07 PROCEDURE — 3008F BODY MASS INDEX DOCD: CPT | Performed by: OTHER

## 2022-04-07 PROCEDURE — 99213 OFFICE O/P EST LOW 20 MIN: CPT | Performed by: OTHER

## 2022-04-07 RX ORDER — GABAPENTIN 300 MG/1
CAPSULE ORAL
Qty: 270 CAPSULE | Refills: 3 | Status: SHIPPED | OUTPATIENT
Start: 2022-04-07

## 2022-04-11 ENCOUNTER — OFFICE VISIT (OUTPATIENT)
Dept: OPHTHALMOLOGY | Facility: CLINIC | Age: 82
End: 2022-04-11
Payer: MEDICARE

## 2022-04-11 DIAGNOSIS — H52.03 HYPEROPIA OF BOTH EYES WITH ASTIGMATISM: ICD-10-CM

## 2022-04-11 DIAGNOSIS — H52.203 HYPEROPIA OF BOTH EYES WITH ASTIGMATISM: ICD-10-CM

## 2022-04-11 DIAGNOSIS — H52.4 PRESBYOPIA OF BOTH EYES: ICD-10-CM

## 2022-04-11 DIAGNOSIS — H53.2 VERTICAL DIPLOPIA: ICD-10-CM

## 2022-04-11 DIAGNOSIS — H49.12 LEFT SUPERIOR OBLIQUE PALSY: Primary | ICD-10-CM

## 2022-04-11 PROCEDURE — 92014 COMPRE OPH EXAM EST PT 1/>: CPT | Performed by: OPHTHALMOLOGY

## 2022-04-11 PROCEDURE — 92015 DETERMINE REFRACTIVE STATE: CPT | Performed by: OPHTHALMOLOGY

## 2022-04-11 PROCEDURE — 92060 SENSORIMOTOR EXAMINATION: CPT | Performed by: OPHTHALMOLOGY

## 2022-04-11 NOTE — PATIENT INSTRUCTIONS
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 Patient has history of Hypertension. New glasses given with ground in prism. 4.0 up ground in right lens   3.0 down ground in the left lens    Vertical diplopia  New glasses given with ground in prism.

## 2022-04-11 NOTE — ASSESSMENT & PLAN NOTE
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 History of vertical diplopia (onset September 2021) MRI showed microvascular ischemic changes. Patient has history of Hypertension. New glasses given with ground in prism.      4.0 base up ground in right lens   3.0 base down ground in the left lens

## 2022-04-27 ENCOUNTER — OFFICE VISIT (OUTPATIENT)
Dept: INTERNAL MEDICINE CLINIC | Facility: CLINIC | Age: 82
End: 2022-04-27
Payer: MEDICARE

## 2022-04-27 VITALS
WEIGHT: 122 LBS | RESPIRATION RATE: 16 BRPM | BODY MASS INDEX: 24.6 KG/M2 | DIASTOLIC BLOOD PRESSURE: 76 MMHG | SYSTOLIC BLOOD PRESSURE: 160 MMHG | HEIGHT: 59 IN | HEART RATE: 48 BPM

## 2022-04-27 DIAGNOSIS — G25.81 RESTLESS LEGS SYNDROME: ICD-10-CM

## 2022-04-27 DIAGNOSIS — Z12.11 COLON CANCER SCREENING: ICD-10-CM

## 2022-04-27 DIAGNOSIS — H52.4 PRESBYOPIA OF BOTH EYES: ICD-10-CM

## 2022-04-27 DIAGNOSIS — Z13.820 SCREENING FOR OSTEOPOROSIS: ICD-10-CM

## 2022-04-27 DIAGNOSIS — H53.2 VERTICAL DIPLOPIA: ICD-10-CM

## 2022-04-27 DIAGNOSIS — H52.03 HYPEROPIA OF BOTH EYES WITH ASTIGMATISM: ICD-10-CM

## 2022-04-27 DIAGNOSIS — H25.13 AGE-RELATED NUCLEAR CATARACT OF BOTH EYES: ICD-10-CM

## 2022-04-27 DIAGNOSIS — I48.91 ATRIAL FIBRILLATION, UNSPECIFIED TYPE (HCC): ICD-10-CM

## 2022-04-27 DIAGNOSIS — H49.12 LEFT SUPERIOR OBLIQUE PALSY: ICD-10-CM

## 2022-04-27 DIAGNOSIS — H52.203 HYPEROPIA OF BOTH EYES WITH ASTIGMATISM: ICD-10-CM

## 2022-04-27 DIAGNOSIS — I10 ESSENTIAL HYPERTENSION: Primary | ICD-10-CM

## 2022-04-27 RX ORDER — CHLORHEXIDINE GLUCONATE 0.12 MG/ML
RINSE ORAL
COMMUNITY
Start: 2022-04-25

## 2022-05-08 PROBLEM — R53.81 MALAISE AND FATIGUE: Status: RESOLVED | Noted: 2017-03-24 | Resolved: 2022-05-08

## 2022-05-08 PROBLEM — R55 SYNCOPE AND COLLAPSE: Status: RESOLVED | Noted: 2021-09-30 | Resolved: 2022-05-08

## 2022-05-08 PROBLEM — R00.2 HEART PALPITATIONS: Status: RESOLVED | Noted: 2021-09-30 | Resolved: 2022-05-08

## 2022-05-08 PROBLEM — G47.00 INSOMNIA: Status: RESOLVED | Noted: 2017-03-24 | Resolved: 2022-05-08

## 2022-05-08 PROBLEM — R94.4 DECREASED GFR: Status: RESOLVED | Noted: 2019-08-12 | Resolved: 2022-05-08

## 2022-05-08 PROBLEM — R53.83 MALAISE AND FATIGUE: Status: RESOLVED | Noted: 2017-03-24 | Resolved: 2022-05-08

## 2022-05-08 PROBLEM — H53.8 BLURRY VISION, LEFT EYE: Status: RESOLVED | Noted: 2021-09-30 | Resolved: 2022-05-08

## 2022-05-08 PROBLEM — L98.9 SKIN LESIONS: Status: RESOLVED | Noted: 2021-06-21 | Resolved: 2022-05-08

## 2022-06-28 DIAGNOSIS — I10 ESSENTIAL HYPERTENSION: ICD-10-CM

## 2022-06-30 RX ORDER — LISINOPRIL AND HYDROCHLOROTHIAZIDE 25; 20 MG/1; MG/1
TABLET ORAL
Qty: 90 TABLET | Refills: 0 | Status: SHIPPED | OUTPATIENT
Start: 2022-06-30

## 2022-07-16 RX ORDER — FAMOTIDINE 20 MG/1
TABLET, FILM COATED ORAL
Qty: 180 TABLET | Refills: 0 | Status: SHIPPED | OUTPATIENT
Start: 2022-07-16

## 2022-07-21 ENCOUNTER — MED REC SCAN ONLY (OUTPATIENT)
Dept: INTERNAL MEDICINE CLINIC | Facility: CLINIC | Age: 82
End: 2022-07-21

## 2022-07-25 ENCOUNTER — OFFICE VISIT (OUTPATIENT)
Dept: INTERNAL MEDICINE CLINIC | Facility: CLINIC | Age: 82
End: 2022-07-25
Payer: MEDICARE

## 2022-07-25 VITALS
WEIGHT: 123 LBS | SYSTOLIC BLOOD PRESSURE: 148 MMHG | HEART RATE: 56 BPM | BODY MASS INDEX: 24.8 KG/M2 | OXYGEN SATURATION: 97 % | DIASTOLIC BLOOD PRESSURE: 77 MMHG | HEIGHT: 59 IN

## 2022-07-25 DIAGNOSIS — I10 ESSENTIAL HYPERTENSION: Primary | ICD-10-CM

## 2022-07-25 DIAGNOSIS — R94.4 DECREASED GFR: ICD-10-CM

## 2022-07-25 DIAGNOSIS — I48.91 ATRIAL FIBRILLATION, UNSPECIFIED TYPE (HCC): ICD-10-CM

## 2022-07-25 DIAGNOSIS — H49.12 LEFT SUPERIOR OBLIQUE PALSY: ICD-10-CM

## 2022-07-25 PROCEDURE — 99214 OFFICE O/P EST MOD 30 MIN: CPT | Performed by: INTERNAL MEDICINE

## 2022-07-25 PROCEDURE — 3008F BODY MASS INDEX DOCD: CPT | Performed by: INTERNAL MEDICINE

## 2022-07-25 PROCEDURE — 1126F AMNT PAIN NOTED NONE PRSNT: CPT | Performed by: INTERNAL MEDICINE

## 2022-07-25 PROCEDURE — 3078F DIAST BP <80 MM HG: CPT | Performed by: INTERNAL MEDICINE

## 2022-07-25 PROCEDURE — 3077F SYST BP >= 140 MM HG: CPT | Performed by: INTERNAL MEDICINE

## 2022-07-25 RX ORDER — LISINOPRIL 10 MG/1
10 TABLET ORAL DAILY
Qty: 90 TABLET | Refills: 0 | Status: SHIPPED | OUTPATIENT
Start: 2022-07-25

## 2022-08-09 ENCOUNTER — OFFICE VISIT (OUTPATIENT)
Dept: NEUROLOGY | Facility: CLINIC | Age: 82
End: 2022-08-09
Payer: MEDICARE

## 2022-08-09 VITALS
BODY MASS INDEX: 25 KG/M2 | WEIGHT: 124 LBS | SYSTOLIC BLOOD PRESSURE: 128 MMHG | DIASTOLIC BLOOD PRESSURE: 74 MMHG | HEIGHT: 59 IN

## 2022-08-09 DIAGNOSIS — R20.2 PARESTHESIA: ICD-10-CM

## 2022-08-09 DIAGNOSIS — G50.0 SUPRAORBITAL NEURALGIA: ICD-10-CM

## 2022-08-09 DIAGNOSIS — G25.81 RLS (RESTLESS LEGS SYNDROME): Primary | ICD-10-CM

## 2022-08-09 DIAGNOSIS — H53.2 DIPLOPIA: ICD-10-CM

## 2022-08-09 PROCEDURE — 3008F BODY MASS INDEX DOCD: CPT | Performed by: OTHER

## 2022-08-09 PROCEDURE — 3074F SYST BP LT 130 MM HG: CPT | Performed by: OTHER

## 2022-08-09 PROCEDURE — 3078F DIAST BP <80 MM HG: CPT | Performed by: OTHER

## 2022-08-09 PROCEDURE — 99213 OFFICE O/P EST LOW 20 MIN: CPT | Performed by: OTHER

## 2022-08-09 RX ORDER — GABAPENTIN 300 MG/1
CAPSULE ORAL
Qty: 270 CAPSULE | Refills: 3 | Status: SHIPPED | OUTPATIENT
Start: 2022-08-09

## 2022-08-18 ENCOUNTER — OFFICE VISIT (OUTPATIENT)
Dept: INTERNAL MEDICINE CLINIC | Facility: CLINIC | Age: 82
End: 2022-08-18
Payer: MEDICARE

## 2022-08-18 ENCOUNTER — MED REC SCAN ONLY (OUTPATIENT)
Dept: INTERNAL MEDICINE CLINIC | Facility: CLINIC | Age: 82
End: 2022-08-18

## 2022-08-18 VITALS
SYSTOLIC BLOOD PRESSURE: 138 MMHG | OXYGEN SATURATION: 97 % | WEIGHT: 122 LBS | HEIGHT: 59 IN | DIASTOLIC BLOOD PRESSURE: 80 MMHG | BODY MASS INDEX: 24.6 KG/M2 | HEART RATE: 58 BPM

## 2022-08-18 DIAGNOSIS — I10 ESSENTIAL HYPERTENSION: Primary | ICD-10-CM

## 2022-08-18 DIAGNOSIS — I48.91 NEW ONSET A-FIB (HCC): ICD-10-CM

## 2022-08-18 PROCEDURE — 99214 OFFICE O/P EST MOD 30 MIN: CPT | Performed by: INTERNAL MEDICINE

## 2022-08-18 PROCEDURE — 3075F SYST BP GE 130 - 139MM HG: CPT | Performed by: INTERNAL MEDICINE

## 2022-08-18 PROCEDURE — 3008F BODY MASS INDEX DOCD: CPT | Performed by: INTERNAL MEDICINE

## 2022-08-18 PROCEDURE — 3079F DIAST BP 80-89 MM HG: CPT | Performed by: INTERNAL MEDICINE

## 2022-08-18 PROCEDURE — 1126F AMNT PAIN NOTED NONE PRSNT: CPT | Performed by: INTERNAL MEDICINE

## 2022-09-12 ENCOUNTER — LAB ENCOUNTER (OUTPATIENT)
Dept: LAB | Age: 82
End: 2022-09-12
Attending: INTERNAL MEDICINE
Payer: MEDICARE

## 2022-09-12 DIAGNOSIS — R94.4 DECREASED GFR: ICD-10-CM

## 2022-09-12 DIAGNOSIS — I10 ESSENTIAL HYPERTENSION: ICD-10-CM

## 2022-09-12 LAB
ALBUMIN SERPL-MCNC: 3.5 G/DL (ref 3.4–5)
ALBUMIN/GLOB SERPL: 1 {RATIO} (ref 1–2)
ALP LIVER SERPL-CCNC: 70 U/L
ALT SERPL-CCNC: 32 U/L
ANION GAP SERPL CALC-SCNC: 7 MMOL/L (ref 0–18)
AST SERPL-CCNC: 30 U/L (ref 15–37)
BASOPHILS # BLD AUTO: 0.06 X10(3) UL (ref 0–0.2)
BASOPHILS NFR BLD AUTO: 1 %
BILIRUB SERPL-MCNC: 0.6 MG/DL (ref 0.1–2)
BUN BLD-MCNC: 18 MG/DL (ref 7–18)
BUN/CREAT SERPL: 19.6 (ref 10–20)
CALCIUM BLD-MCNC: 9.3 MG/DL (ref 8.5–10.1)
CHLORIDE SERPL-SCNC: 103 MMOL/L (ref 98–112)
CHOLEST SERPL-MCNC: 172 MG/DL (ref ?–200)
CO2 SERPL-SCNC: 29 MMOL/L (ref 21–32)
CREAT BLD-MCNC: 0.92 MG/DL
DEPRECATED RDW RBC AUTO: 44.2 FL (ref 35.1–46.3)
EOSINOPHIL # BLD AUTO: 0.26 X10(3) UL (ref 0–0.7)
EOSINOPHIL NFR BLD AUTO: 4.3 %
ERYTHROCYTE [DISTWIDTH] IN BLOOD BY AUTOMATED COUNT: 13.6 % (ref 11–15)
FASTING PATIENT LIPID ANSWER: YES
FASTING STATUS PATIENT QL REPORTED: YES
GFR SERPLBLD BASED ON 1.73 SQ M-ARVRAT: 62 ML/MIN/1.73M2 (ref 60–?)
GLOBULIN PLAS-MCNC: 3.4 G/DL (ref 2.8–4.4)
GLUCOSE BLD-MCNC: 96 MG/DL (ref 70–99)
HCT VFR BLD AUTO: 40 %
HDLC SERPL-MCNC: 55 MG/DL (ref 40–59)
HGB BLD-MCNC: 13 G/DL
IMM GRANULOCYTES # BLD AUTO: 0.01 X10(3) UL (ref 0–1)
IMM GRANULOCYTES NFR BLD: 0.2 %
LDLC SERPL CALC-MCNC: 102 MG/DL (ref ?–100)
LYMPHOCYTES # BLD AUTO: 2.12 X10(3) UL (ref 1–4)
LYMPHOCYTES NFR BLD AUTO: 35 %
MCH RBC QN AUTO: 28.8 PG (ref 26–34)
MCHC RBC AUTO-ENTMCNC: 32.5 G/DL (ref 31–37)
MCV RBC AUTO: 88.5 FL
MONOCYTES # BLD AUTO: 0.45 X10(3) UL (ref 0.1–1)
MONOCYTES NFR BLD AUTO: 7.4 %
NEUTROPHILS # BLD AUTO: 3.16 X10 (3) UL (ref 1.5–7.7)
NEUTROPHILS # BLD AUTO: 3.16 X10(3) UL (ref 1.5–7.7)
NEUTROPHILS NFR BLD AUTO: 52.1 %
NONHDLC SERPL-MCNC: 117 MG/DL (ref ?–130)
OSMOLALITY SERPL CALC.SUM OF ELEC: 290 MOSM/KG (ref 275–295)
PLATELET # BLD AUTO: 284 10(3)UL (ref 150–450)
POTASSIUM SERPL-SCNC: 3.8 MMOL/L (ref 3.5–5.1)
PROT SERPL-MCNC: 6.9 G/DL (ref 6.4–8.2)
RBC # BLD AUTO: 4.52 X10(6)UL
SODIUM SERPL-SCNC: 139 MMOL/L (ref 136–145)
TRIGL SERPL-MCNC: 79 MG/DL (ref 30–149)
TSI SER-ACNC: 1.57 MIU/ML (ref 0.36–3.74)
VLDLC SERPL CALC-MCNC: 13 MG/DL (ref 0–30)
WBC # BLD AUTO: 6.1 X10(3) UL (ref 4–11)

## 2022-09-12 PROCEDURE — 84443 ASSAY THYROID STIM HORMONE: CPT

## 2022-09-12 PROCEDURE — 36415 COLL VENOUS BLD VENIPUNCTURE: CPT

## 2022-09-12 PROCEDURE — 80053 COMPREHEN METABOLIC PANEL: CPT

## 2022-09-12 PROCEDURE — 85025 COMPLETE CBC W/AUTO DIFF WBC: CPT

## 2022-09-12 PROCEDURE — 80061 LIPID PANEL: CPT

## 2022-09-26 DIAGNOSIS — I10 ESSENTIAL HYPERTENSION: ICD-10-CM

## 2022-09-26 RX ORDER — LISINOPRIL AND HYDROCHLOROTHIAZIDE 25; 20 MG/1; MG/1
1 TABLET ORAL DAILY
Qty: 90 TABLET | Refills: 1 | Status: SHIPPED | OUTPATIENT
Start: 2022-09-26

## 2022-09-26 NOTE — TELEPHONE ENCOUNTER
[facility]. Chart reviewed. Pt to take in addition to lisinopril 10mg    Requested Prescriptions   Pending Prescriptions Disp Refills    LISINOPRIL-HYDROCHLOROTHIAZIDE 20-25 MG Oral Tab [Pharmacy Med Name: Lisinopril-hydroCHLOROthiazide 20-25 MG Oral Tablet] 90 tablet 0     Sig: Take 1 tablet by mouth once daily        Hypertensive Medications Protocol Passed - 9/26/2022 12:06 PM        Passed - In person appointment in the past 12 or next 3 months       Recent Outpatient Visits              1 month ago Essential hypertension    [facility], [address], [place], [place], [place]    Office Visit    1 month ago RLS (restless legs syndrome)    DEIRDRE Nicole MD    Office Visit    2 months ago Essential hypertension    [facility], [address] MD    Office Visit    5 months ago Essential hypertension    [facility], [address], [place], [facility] [address]    Office Visit    5 months ago Left superior oblique palsy    [facility] Shikha Fregoso MD    Office Visit     Future Appointments         Provider Department Appt Notes    In 2 weeks Antolin Daniels MD [facility] Return in 6 months (on 10/11/2022) for recheck prism and then dilated EE with Dr. Jose Collins in October 2022.     In 3 weeks Shikha Fregoso MD [facility] 6m follow     In 4 weeks Estefani Bingham MD [facility], [facility] f/u on meds - policy informed                Passed - Last BP reading less than 140/90     BP Readings from Last 1 Encounters:  08/18/22 : 138/80                Passed - CMP or BMP in past 6 months     Recent Results (from the past 4392 hour(s))   COMP METABOLIC PANEL (14)    Collection Time: 09/12/22 10:27 AM   Result Value Ref Range    Glucose 96 70 - 99 mg/dL    Sodium 139 136 - 145 mmol/L    Potassium 3.8 3.5 - 5.1 mmol/L    Chloride 103 98 - 112 mmol/L    CO2 29.0 21.0 - 32.0 mmol/L    Anion Gap 7 0 - 18 mmol/L    BUN 18 7 - 18 mg/dL    Creatinine 0.92 0.55 - 1.02 mg/dL    BUN/CREA Ratio 19.6 10.0 - 20.0    Calcium, Total 9.3 8.5 - 10.1 mg/dL    Calculated Osmolality 290 275 - 295 mOsm/kg    eGFR-Cr 62 >=60 mL/min/1.73m2    ALT 32 13 - 56 U/L    AST 30 15 - 37 U/L    Alkaline Phosphatase 70 55 - 142 U/L    Bilirubin, Total 0.6 0.1 - 2.0 mg/dL    Total Protein 6.9 6.4 - 8.2 g/dL    Albumin 3.5 3.4 - 5.0 g/dL    Globulin  3.4 2.8 - 4.4 g/dL    A/G Ratio 1.0 1.0 - 2.0    Patient Fasting for CMP? Yes      *Note: Due to a large number of results and/or encounters for the requested time period, some results have not been displayed. A complete set of results can be found in Results Review. Passed - In person appointment or virtual visit in the past 6 months       Recent Outpatient Visits              1 month ago Essential hypertension    [address] [place], [place], [place]    Office Visit    1 month ago RLS (restless legs syndrome)    DEIRDRE Kenny MD    Office Visit    2 months ago Essential hypertension    [address] [place] Judye Daniels, MD    Office Visit    5 months ago Essential hypertension    [address] [place], [facility] [address]    Office Visit    5 months ago Left superior oblique palsy    [facility] Jimmy Pascual MD    Office Visit     Future Appointments         Provider Department Appt Notes    In 2 weeks Ky Preciado MD [facility] Return in 6 months (on 10/11/2022) for recheck prism and then dilated EE with Dr. Jocelynn Fine in October 2022.     In 3 weeks Jimmy Pascual MD [facility] 6m follow     In 4 weeks Rebecca Donato MD [place] [email or web address], [address] [place] [address] f/u on meds - policy informed                Passed - EGFRCR or GFRNAA > 50     GFR Evaluation  EGFRCR: 62 , resulted on 9/12/2022                  Recent Outpatient Visits              1 month ago Essential hypertension    [place] [facility], [address], [place], [place], [place]    Office Visit    1 month ago RLS (restless legs syndrome)    [facility] Fiona Washington MD    Office Visit    2 months ago Essential hypertension    [place] [facility], [address] MD    Office Visit    5 months ago Essential hypertension    [facility], [address] [place], [facility] [address]    Office Visit    5 months ago Left superior oblique palsy    [facility] Sivan Hernandez MD    Office Visit            Future Appointments         Provider Department Appt Notes    In 2 weeks Sharyn Ivan MD [facility] Return in 6 months (on 10/11/2022) for recheck prism and then dilated EE with Dr. Ruddy Smiley in October 2022.     In 3 weeks Sivan Hernandez MD [facility] 6m follow     In 4 weeks Rebecca Donato MD [place] [facility], [facility] f/u on meds - policy informed

## 2022-09-27 ENCOUNTER — TELEPHONE (OUTPATIENT)
Dept: INTERNAL MEDICINE CLINIC | Facility: CLINIC | Age: 82
End: 2022-09-27

## 2022-09-27 NOTE — TELEPHONE ENCOUNTER
I called the patient. She is requesting a refill of the Lisinopril 20 mg /hydrochlorothiazide 25 mg.  I informed was sent yesterday to the [facility] with understanding.

## 2022-09-27 NOTE — TELEPHONE ENCOUNTER
Patient called stating she has questions regarding recent refill of lisinopril-hydroCHLOROthiazide 20-25 MG Oral Tab.

## 2022-10-04 ENCOUNTER — TELEPHONE (OUTPATIENT)
Dept: INTERNAL MEDICINE CLINIC | Facility: CLINIC | Age: 82
End: 2022-10-04

## 2022-10-04 NOTE — TELEPHONE ENCOUNTER
Called and s/w pt. Informed her that I see no documentation from our office that we called or tried to get a hold of her. I informed her also that we would usually leave a voicemail saying it was our office. She thanked me and verbalized understanding.

## 2022-10-04 NOTE — TELEPHONE ENCOUNTER
Pt on the phone stating she missed a call from Dr. Dc Stinson. Unfortunately I do not see an open encounter with a message.  Please advise

## 2022-10-11 ENCOUNTER — OFFICE VISIT (OUTPATIENT)
Dept: OPHTHALMOLOGY | Facility: CLINIC | Age: 82
End: 2022-10-11
Payer: MEDICARE

## 2022-10-11 DIAGNOSIS — H43.393 VITREOUS FLOATERS OF BOTH EYES: ICD-10-CM

## 2022-10-11 DIAGNOSIS — H25.13 AGE-RELATED NUCLEAR CATARACT OF BOTH EYES: Primary | ICD-10-CM

## 2022-10-11 DIAGNOSIS — H49.12 LEFT SUPERIOR OBLIQUE PALSY: ICD-10-CM

## 2022-10-11 DIAGNOSIS — H57.12 LEFT EYE PAIN: ICD-10-CM

## 2022-10-11 PROCEDURE — 1126F AMNT PAIN NOTED NONE PRSNT: CPT | Performed by: OPHTHALMOLOGY

## 2022-10-11 PROCEDURE — 92014 COMPRE OPH EXAM EST PT 1/>: CPT | Performed by: OPHTHALMOLOGY

## 2022-10-11 NOTE — ASSESSMENT & PLAN NOTE
No cause found for left eye pain. Reassured patient that there is no infection, inflammation, foreign body, abrasion or increased pressure in the eyes. Continue to follow up with Dr. Rolly Donovan and Dr. Noe Riley for pain.

## 2022-10-11 NOTE — PATIENT INSTRUCTIONS
Age-related nuclear cataract of both eyes  Discussed mild cataracts in both eyes that are not affecting vision and are not surgical at this time. Vitreous floaters of both eyes  No treatment. Left superior oblique palsy  Patient does not have any double vision with ground in prism. Patient has appointment scheduled on 10/17/22 with Dr. Doreatha Prader. Left eye pain  No cause found for left eye pain. Reassured patient that there is no infection, inflammation, foreign body, abrasion or increased pressure in the eyes. Continue to follow up with Dr. Stallworth Staff and Dr. Yvonne Grimes for pain.

## 2022-10-11 NOTE — ASSESSMENT & PLAN NOTE
Patient does not have any double vision with ground in prism. Patient has appointment scheduled on 10/17/22 with Dr. Magan Coleman.

## 2022-10-17 ENCOUNTER — OFFICE VISIT (OUTPATIENT)
Dept: OPHTHALMOLOGY | Facility: CLINIC | Age: 82
End: 2022-10-17
Payer: MEDICARE

## 2022-10-17 DIAGNOSIS — H49.12 LEFT SUPERIOR OBLIQUE PALSY: Primary | ICD-10-CM

## 2022-10-17 DIAGNOSIS — H53.2 VERTICAL DIPLOPIA: ICD-10-CM

## 2022-10-17 DIAGNOSIS — H25.13 AGE-RELATED NUCLEAR CATARACT OF BOTH EYES: ICD-10-CM

## 2022-10-17 PROCEDURE — 92014 COMPRE OPH EXAM EST PT 1/>: CPT | Performed by: OPHTHALMOLOGY

## 2022-10-17 PROCEDURE — 92060 SENSORIMOTOR EXAMINATION: CPT | Performed by: OPHTHALMOLOGY

## 2022-10-17 NOTE — ASSESSMENT & PLAN NOTE
Stable; slight improvement. Continue with same ground in prism glasses. 4.0 base up ground in right lens   3.0 base down ground in the left lens    Patient prefers 1 less prism when measured in the office- pt see's single with 6 or 7 prisms. She does not want to remake glasses at this time and will keep her prism glasses at total of 7. We will recheck prism in 6 months.

## 2022-10-17 NOTE — PATIENT INSTRUCTIONS
Left superior oblique palsy  Stable; slight improvement. Continue with same ground in prism glasses. 4.0 base up ground in right lens   3.0 base down ground in the left lens    Patient prefers 1 less prism when measured in the office- pt see's single with 6 or 7 prisms. She does not want to remake glasses at this time and will keep her prism glasses at total of 7. We will recheck prism in 6 months. Vertical diplopia  Continue with same prism glasses. Age-related nuclear cataract of both eyes  Patient will follow up with Dr. Butch Barcenas once a year for cataract evaluation.

## 2022-10-24 ENCOUNTER — OFFICE VISIT (OUTPATIENT)
Dept: INTERNAL MEDICINE CLINIC | Facility: CLINIC | Age: 82
End: 2022-10-24
Payer: MEDICARE

## 2022-10-24 VITALS
WEIGHT: 126 LBS | OXYGEN SATURATION: 99 % | SYSTOLIC BLOOD PRESSURE: 158 MMHG | DIASTOLIC BLOOD PRESSURE: 76 MMHG | HEART RATE: 55 BPM | BODY MASS INDEX: 25 KG/M2

## 2022-10-24 DIAGNOSIS — I48.19 PERSISTENT ATRIAL FIBRILLATION (HCC): ICD-10-CM

## 2022-10-24 DIAGNOSIS — I10 ESSENTIAL HYPERTENSION: Primary | ICD-10-CM

## 2022-10-24 PROCEDURE — 99214 OFFICE O/P EST MOD 30 MIN: CPT | Performed by: INTERNAL MEDICINE

## 2022-10-24 PROCEDURE — 3078F DIAST BP <80 MM HG: CPT | Performed by: INTERNAL MEDICINE

## 2022-10-24 PROCEDURE — 90662 IIV NO PRSV INCREASED AG IM: CPT | Performed by: INTERNAL MEDICINE

## 2022-10-24 PROCEDURE — 1126F AMNT PAIN NOTED NONE PRSNT: CPT | Performed by: INTERNAL MEDICINE

## 2022-10-24 PROCEDURE — G0008 ADMIN INFLUENZA VIRUS VAC: HCPCS | Performed by: INTERNAL MEDICINE

## 2022-10-24 PROCEDURE — 3077F SYST BP >= 140 MM HG: CPT | Performed by: INTERNAL MEDICINE

## 2022-11-17 ENCOUNTER — OFFICE VISIT (OUTPATIENT)
Dept: INTERNAL MEDICINE CLINIC | Facility: CLINIC | Age: 82
End: 2022-11-17
Payer: MEDICARE

## 2022-11-17 DIAGNOSIS — H49.12 LEFT SUPERIOR OBLIQUE PALSY: ICD-10-CM

## 2022-11-17 DIAGNOSIS — I48.20 ATRIAL FIBRILLATION, CHRONIC (HCC): ICD-10-CM

## 2022-11-17 DIAGNOSIS — I10 ESSENTIAL HYPERTENSION: Primary | ICD-10-CM

## 2022-11-17 PROCEDURE — 99214 OFFICE O/P EST MOD 30 MIN: CPT | Performed by: INTERNAL MEDICINE

## 2022-11-17 PROCEDURE — 1126F AMNT PAIN NOTED NONE PRSNT: CPT | Performed by: INTERNAL MEDICINE

## 2022-11-17 PROCEDURE — 3008F BODY MASS INDEX DOCD: CPT | Performed by: INTERNAL MEDICINE

## 2022-11-17 PROCEDURE — 3078F DIAST BP <80 MM HG: CPT | Performed by: INTERNAL MEDICINE

## 2022-11-17 PROCEDURE — 3075F SYST BP GE 130 - 139MM HG: CPT | Performed by: INTERNAL MEDICINE

## 2022-11-21 VITALS
OXYGEN SATURATION: 99 % | SYSTOLIC BLOOD PRESSURE: 139 MMHG | WEIGHT: 124 LBS | HEART RATE: 64 BPM | HEIGHT: 59 IN | BODY MASS INDEX: 25 KG/M2 | DIASTOLIC BLOOD PRESSURE: 74 MMHG

## 2022-11-21 NOTE — TELEPHONE ENCOUNTER
Please review. Protocol failed/ No protocol.     Requested Prescriptions   Pending Prescriptions Disp Refills    LISINOPRIL 10 MG Oral Tab [Pharmacy Med Name: Lisinopril 10 MG Oral Tablet] 90 tablet 0     Sig: TAKE 1 TABLET BY MOUTH ONCE DAILY IN  ADDITION  TO  LISINOPRIL/HYDROCHLOROTHIAZIDE  20/25MG  EVERY  DAY       Hypertensive Medications Protocol Failed - 11/19/2022  3:29 PM        Failed - Last BP reading less than 140/90     BP Readings from Last 1 Encounters:  11/17/22 : 156/76              Passed - In person appointment in the past 12 or next 3 months     Recent Outpatient Visits              3 days ago Essential hypertension    [facility], [address], [place], [place], [place]    Office Visit    3 weeks ago Essential hypertension    [facility], [address], [place], [place], [place]    Office Visit    1 month ago Left superior oblique palsy    [facility] Gigi Oseguera MD    Office Visit    1 month ago Age-related nuclear cataract of both eyes    [facility] Ciara Jenkins MD    Office Visit    3 months ago Essential hypertension    [place] [facility], [address], [place], [place], [place]    Office Visit          Future Appointments         Provider Department Appt Notes    In 5 months Gigi Oseguera MD [facility] recheck prism                Passed - CMP or BMP in past 6 months     Recent Results (from the past 4392 hour(s))   COMP METABOLIC PANEL (14)    Collection Time: 09/12/22 10:27 AM   Result Value Ref Range    Glucose 96 70 - 99 mg/dL    Sodium 139 136 - 145 mmol/L    Potassium 3.8 3.5 - 5.1 mmol/L    Chloride 103 98 - 112 mmol/L    CO2 29.0 21.0 - 32.0 mmol/L    Anion Gap 7 0 - 18 mmol/L    BUN 18 7 - 18 mg/dL    Creatinine 0.92 0.55 - 1.02 mg/dL    BUN/CREA Ratio 19.6 10.0 - 20.0    Calcium, Total 9.3 8.5 - 10.1 mg/dL Calculated Osmolality 290 275 - 295 mOsm/kg    eGFR-Cr 62 >=60 mL/min/1.73m2    ALT 32 13 - 56 U/L    AST 30 15 - 37 U/L    Alkaline Phosphatase 70 55 - 142 U/L    Bilirubin, Total 0.6 0.1 - 2.0 mg/dL    Total Protein 6.9 6.4 - 8.2 g/dL    Albumin 3.5 3.4 - 5.0 g/dL    Globulin  3.4 2.8 - 4.4 g/dL    A/G Ratio 1.0 1.0 - 2.0    Patient Fasting for CMP? Yes      *Note: Due to a large number of results and/or encounters for the requested time period, some results have not been displayed. A complete set of results can be found in Results Review.                Passed - In person appointment or virtual visit in the past 6 months     Recent Outpatient Visits              3 days ago Essential hypertension    Arash Bobby MD    Office Visit    3 weeks ago Essential hypertension    Arash Bobby MD    Office Visit    1 month ago Left superior oblique palsy    [facility] Adithya Motta MD    Office Visit    1 month ago Age-related nuclear cataract of both eyes    [facility] Yehuda Jefferson MD    Office Visit    3 months ago Essential hypertension    Jimmy Hernandez, Alis Kwong MD    Office Visit          Future Appointments         Provider Department Appt Notes    In 5 months Adithya Motta MD [facility] recheck prism                Passed [facility] or GFRNAA > 50     GFR Evaluation  EGFRCR: 62 , resulted on 9/12/2022               Recent Outpatient Visits              3 days ago Essential hypertension    Arash Bobby MD    Office Visit    3 weeks ago Essential hypertension    Jimmy Hernandez, Bhargav Dykes MD    Office Visit    1 month ago Left superior oblique palsy    [address] [facility]hthalmology Eufemia Price MD    Office Visit    1 month ago Age-related nuclear cataract of both eyes    [facility] Rudy Ragland MD    Office Visit    3 months ago Essential hypertension    Mariana Romero MD    Office Visit            Future Appointments         Provider Department Appt Notes    In 5 months Eufemia Price MD [facility] recheck prism

## 2022-11-22 RX ORDER — LISINOPRIL 10 MG/1
TABLET ORAL
Qty: 90 TABLET | Refills: 0 | Status: SHIPPED | OUTPATIENT
Start: 2022-11-22

## 2023-01-04 ENCOUNTER — APPOINTMENT (OUTPATIENT)
Dept: MRI IMAGING | Facility: HOSPITAL | Age: 83
End: 2023-01-04
Attending: EMERGENCY MEDICINE
Payer: MEDICARE

## 2023-01-04 ENCOUNTER — HOSPITAL ENCOUNTER (EMERGENCY)
Facility: HOSPITAL | Age: 83
Discharge: HOME | End: 2023-01-05
Attending: EMERGENCY MEDICINE
Payer: MEDICARE

## 2023-01-04 DIAGNOSIS — E87.6 HYPOKALEMIA: ICD-10-CM

## 2023-01-04 DIAGNOSIS — E83.42 HYPOMAGNESEMIA: ICD-10-CM

## 2023-01-04 DIAGNOSIS — R42 DIZZINESS: Primary | ICD-10-CM

## 2023-01-04 DIAGNOSIS — N30.00 ACUTE CYSTITIS WITHOUT HEMATURIA: ICD-10-CM

## 2023-01-04 LAB
ALBUMIN SERPL-MCNC: 3.3 G/DL (ref 3.4–5)
ALBUMIN/GLOB SERPL: 0.9 {RATIO} (ref 1–2)
ALP LIVER SERPL-CCNC: 88 U/L
ALT SERPL-CCNC: 30 U/L
ANION GAP SERPL CALC-SCNC: 9 MMOL/L (ref 0–18)
AST SERPL-CCNC: 28 U/L (ref 15–37)
BASOPHILS # BLD AUTO: 0.05 X10(3) UL (ref 0–0.2)
BASOPHILS NFR BLD AUTO: 0.5 %
BILIRUB SERPL-MCNC: 1.1 MG/DL (ref 0.1–2)
BILIRUB UR QL: NEGATIVE
BUN BLD-MCNC: 16 MG/DL (ref 7–18)
BUN/CREAT SERPL: 16.5 (ref 10–20)
CALCIUM BLD-MCNC: 8.7 MG/DL (ref 8.5–10.1)
CHLORIDE SERPL-SCNC: 90 MMOL/L (ref 98–112)
CO2 SERPL-SCNC: 28 MMOL/L (ref 21–32)
COLOR UR: YELLOW
CREAT BLD-MCNC: 0.97 MG/DL
DEPRECATED RDW RBC AUTO: 40.9 FL (ref 35.1–46.3)
EOSINOPHIL # BLD AUTO: 0.06 X10(3) UL (ref 0–0.7)
EOSINOPHIL NFR BLD AUTO: 0.6 %
ERYTHROCYTE [DISTWIDTH] IN BLOOD BY AUTOMATED COUNT: 13 % (ref 11–15)
GFR SERPLBLD BASED ON 1.73 SQ M-ARVRAT: 58 ML/MIN/1.73M2 (ref 60–?)
GLOBULIN PLAS-MCNC: 3.6 G/DL (ref 2.8–4.4)
GLUCOSE BLD-MCNC: 129 MG/DL (ref 70–99)
GLUCOSE UR-MCNC: NEGATIVE MG/DL
HCT VFR BLD AUTO: 35.5 %
HGB BLD-MCNC: 12.1 G/DL
IMM GRANULOCYTES # BLD AUTO: 0.02 X10(3) UL (ref 0–1)
IMM GRANULOCYTES NFR BLD: 0.2 %
KETONES UR-MCNC: 15 MG/DL
LYMPHOCYTES # BLD AUTO: 2.31 X10(3) UL (ref 1–4)
LYMPHOCYTES NFR BLD AUTO: 24.9 %
MAGNESIUM SERPL-MCNC: 1.5 MG/DL (ref 1.6–2.6)
MCH RBC QN AUTO: 29.2 PG (ref 26–34)
MCHC RBC AUTO-ENTMCNC: 34.1 G/DL (ref 31–37)
MCV RBC AUTO: 85.7 FL
MONOCYTES # BLD AUTO: 0.62 X10(3) UL (ref 0.1–1)
MONOCYTES NFR BLD AUTO: 6.7 %
NEUTROPHILS # BLD AUTO: 6.23 X10 (3) UL (ref 1.5–7.7)
NEUTROPHILS # BLD AUTO: 6.23 X10(3) UL (ref 1.5–7.7)
NEUTROPHILS NFR BLD AUTO: 67.1 %
NITRITE UR QL STRIP.AUTO: NEGATIVE
OSMOLALITY SERPL CALC.SUM OF ELEC: 267 MOSM/KG (ref 275–295)
PH UR: 6 [PH] (ref 5–8)
PLATELET # BLD AUTO: 254 10(3)UL (ref 150–450)
POTASSIUM SERPL-SCNC: 3.1 MMOL/L (ref 3.5–5.1)
PROT SERPL-MCNC: 6.9 G/DL (ref 6.4–8.2)
PROT UR-MCNC: NEGATIVE MG/DL
RBC # BLD AUTO: 4.14 X10(6)UL
SODIUM SERPL-SCNC: 127 MMOL/L (ref 136–145)
SP GR UR STRIP: 1.01 (ref 1–1.03)
TROPONIN I HIGH SENSITIVITY: 8 NG/L
UROBILINOGEN UR STRIP-ACNC: 0.2
WBC # BLD AUTO: 9.3 X10(3) UL (ref 4–11)
WBC #/AREA URNS AUTO: >50 /HPF
WBC #/AREA URNS AUTO: >50 /HPF

## 2023-01-04 PROCEDURE — 85025 COMPLETE CBC W/AUTO DIFF WBC: CPT | Performed by: EMERGENCY MEDICINE

## 2023-01-04 PROCEDURE — 93010 ELECTROCARDIOGRAM REPORT: CPT

## 2023-01-04 PROCEDURE — 84484 ASSAY OF TROPONIN QUANT: CPT

## 2023-01-04 PROCEDURE — 87077 CULTURE AEROBIC IDENTIFY: CPT | Performed by: EMERGENCY MEDICINE

## 2023-01-04 PROCEDURE — 96361 HYDRATE IV INFUSION ADD-ON: CPT

## 2023-01-04 PROCEDURE — 70551 MRI BRAIN STEM W/O DYE: CPT | Performed by: EMERGENCY MEDICINE

## 2023-01-04 PROCEDURE — 87086 URINE CULTURE/COLONY COUNT: CPT | Performed by: EMERGENCY MEDICINE

## 2023-01-04 PROCEDURE — 93005 ELECTROCARDIOGRAM TRACING: CPT

## 2023-01-04 PROCEDURE — 80053 COMPREHEN METABOLIC PANEL: CPT

## 2023-01-04 PROCEDURE — 85025 COMPLETE CBC W/AUTO DIFF WBC: CPT

## 2023-01-04 PROCEDURE — 99285 EMERGENCY DEPT VISIT HI MDM: CPT

## 2023-01-04 PROCEDURE — 83735 ASSAY OF MAGNESIUM: CPT | Performed by: EMERGENCY MEDICINE

## 2023-01-04 PROCEDURE — 80053 COMPREHEN METABOLIC PANEL: CPT | Performed by: EMERGENCY MEDICINE

## 2023-01-04 PROCEDURE — 81015 MICROSCOPIC EXAM OF URINE: CPT | Performed by: EMERGENCY MEDICINE

## 2023-01-04 PROCEDURE — 96365 THER/PROPH/DIAG IV INF INIT: CPT

## 2023-01-04 PROCEDURE — 96368 THER/DIAG CONCURRENT INF: CPT

## 2023-01-04 PROCEDURE — 81001 URINALYSIS AUTO W/SCOPE: CPT | Performed by: EMERGENCY MEDICINE

## 2023-01-04 PROCEDURE — 84484 ASSAY OF TROPONIN QUANT: CPT | Performed by: EMERGENCY MEDICINE

## 2023-01-04 PROCEDURE — 87186 SC STD MICRODIL/AGAR DIL: CPT | Performed by: EMERGENCY MEDICINE

## 2023-01-04 RX ORDER — POTASSIUM CHLORIDE 20 MEQ/1
40 TABLET, EXTENDED RELEASE ORAL ONCE
Status: COMPLETED | OUTPATIENT
Start: 2023-01-04 | End: 2023-01-04

## 2023-01-04 RX ORDER — MAGNESIUM SULFATE HEPTAHYDRATE 40 MG/ML
2 INJECTION, SOLUTION INTRAVENOUS ONCE
Status: COMPLETED | OUTPATIENT
Start: 2023-01-04 | End: 2023-01-04

## 2023-01-04 RX ORDER — MECLIZINE HYDROCHLORIDE 25 MG/1
25 TABLET ORAL ONCE
Status: COMPLETED | OUTPATIENT
Start: 2023-01-04 | End: 2023-01-04

## 2023-01-05 VITALS
RESPIRATION RATE: 15 BRPM | TEMPERATURE: 98 F | WEIGHT: 124 LBS | HEART RATE: 57 BPM | BODY MASS INDEX: 26.03 KG/M2 | SYSTOLIC BLOOD PRESSURE: 127 MMHG | OXYGEN SATURATION: 97 % | HEIGHT: 58 IN | DIASTOLIC BLOOD PRESSURE: 67 MMHG

## 2023-01-05 LAB
ATRIAL RATE: 80 BPM
P AXIS: 49 DEGREES
P-R INTERVAL: 172 MS
Q-T INTERVAL: 392 MS
QRS DURATION: 78 MS
QTC CALCULATION (BEZET): 452 MS
R AXIS: -19 DEGREES
T AXIS: 36 DEGREES
VENTRICULAR RATE: 80 BPM

## 2023-01-05 RX ORDER — POTASSIUM CHLORIDE 20 MEQ/1
20 TABLET, EXTENDED RELEASE ORAL 2 TIMES DAILY
Qty: 6 TABLET | Refills: 0 | Status: SHIPPED | OUTPATIENT
Start: 2023-01-05 | End: 2023-01-08

## 2023-01-05 RX ORDER — MAGNESIUM OXIDE 400 MG/1
400 TABLET ORAL DAILY
Qty: 5 TABLET | Refills: 0 | Status: SHIPPED | OUTPATIENT
Start: 2023-01-05 | End: 2023-01-10

## 2023-01-05 RX ORDER — CEPHALEXIN 500 MG/1
500 CAPSULE ORAL 2 TIMES DAILY
Qty: 14 CAPSULE | Refills: 0 | Status: SHIPPED | OUTPATIENT
Start: 2023-01-05 | End: 2023-01-11

## 2023-01-05 NOTE — ED INITIAL ASSESSMENT (HPI)
Pt arrives s/p a syncopal episode. Pt reports she was baking today and she continued to have intermittent dizzy episodes. She reports one episode she fell and had LOC. She states she is unsure if she hit her head. Pt denies any pain or injuries. Pt states she has continued dizziness. Pt reports yesterday she was having intermittent jaw pain.  Pt reports taking xarelto

## 2023-01-05 NOTE — ED QUICK NOTES
Patient safe to discharge home per ER MD. Discharge education provided, including follow up instructions. IV removed by this RN. Patient verbalizes understanding. Family at bedside.

## 2023-01-07 RX ORDER — NITROFURANTOIN 25; 75 MG/1; MG/1
100 CAPSULE ORAL 2 TIMES DAILY
Qty: 10 CAPSULE | Refills: 0 | Status: SHIPPED | OUTPATIENT
Start: 2023-01-07 | End: 2023-01-12

## 2023-01-07 NOTE — PROGRESS NOTES
ED Culture Callback Results Review  Pharmacist reviewed culture results from ED visit     Positive  urine culture noted which is not susceptible to previously prescribed cephelaxin. Antibiotic changed to nitrofurantoin 100 mg BID for 5 days. New prescription was sent to [facility] by pharmacist. caregiver contacted by phone. caregiver verbalized understanding of updated treatment plan, all questions answered at this time.

## 2023-01-11 ENCOUNTER — OFFICE VISIT (OUTPATIENT)
Dept: INTERNAL MEDICINE CLINIC | Facility: CLINIC | Age: 83
End: 2023-01-11

## 2023-01-11 VITALS
HEART RATE: 62 BPM | HEIGHT: 58 IN | WEIGHT: 122 LBS | BODY MASS INDEX: 25.61 KG/M2 | DIASTOLIC BLOOD PRESSURE: 80 MMHG | SYSTOLIC BLOOD PRESSURE: 158 MMHG

## 2023-01-11 DIAGNOSIS — E87.1 LOW SODIUM LEVELS: ICD-10-CM

## 2023-01-11 DIAGNOSIS — I10 ESSENTIAL HYPERTENSION: Primary | ICD-10-CM

## 2023-01-11 DIAGNOSIS — R79.0 LOW MAGNESIUM LEVEL: ICD-10-CM

## 2023-01-11 PROCEDURE — 1125F AMNT PAIN NOTED PAIN PRSNT: CPT | Performed by: INTERNAL MEDICINE

## 2023-01-11 PROCEDURE — 3077F SYST BP >= 140 MM HG: CPT | Performed by: INTERNAL MEDICINE

## 2023-01-11 PROCEDURE — 3008F BODY MASS INDEX DOCD: CPT | Performed by: INTERNAL MEDICINE

## 2023-01-11 PROCEDURE — 99214 OFFICE O/P EST MOD 30 MIN: CPT | Performed by: INTERNAL MEDICINE

## 2023-01-11 PROCEDURE — 3079F DIAST BP 80-89 MM HG: CPT | Performed by: INTERNAL MEDICINE

## 2023-01-16 ENCOUNTER — LAB ENCOUNTER (OUTPATIENT)
Dept: LAB | Age: 83
End: 2023-01-16
Attending: INTERNAL MEDICINE
Payer: MEDICARE

## 2023-01-16 DIAGNOSIS — R79.0 LOW MAGNESIUM LEVEL: ICD-10-CM

## 2023-01-16 DIAGNOSIS — I10 ESSENTIAL HYPERTENSION: ICD-10-CM

## 2023-01-16 DIAGNOSIS — E87.1 LOW SODIUM LEVELS: ICD-10-CM

## 2023-01-16 LAB
ALBUMIN SERPL-MCNC: 3.5 G/DL (ref 3.4–5)
ALBUMIN/GLOB SERPL: 1.1 {RATIO} (ref 1–2)
ALP LIVER SERPL-CCNC: 65 U/L
ALT SERPL-CCNC: 22 U/L
ANION GAP SERPL CALC-SCNC: 8 MMOL/L (ref 0–18)
AST SERPL-CCNC: 23 U/L (ref 15–37)
BILIRUB SERPL-MCNC: 0.5 MG/DL (ref 0.1–2)
BUN BLD-MCNC: 17 MG/DL (ref 7–18)
BUN/CREAT SERPL: 18.1 (ref 10–20)
CALCIUM BLD-MCNC: 9.5 MG/DL (ref 8.5–10.1)
CHLORIDE SERPL-SCNC: 100 MMOL/L (ref 98–112)
CO2 SERPL-SCNC: 28 MMOL/L (ref 21–32)
CREAT BLD-MCNC: 0.94 MG/DL
FASTING STATUS PATIENT QL REPORTED: YES
GFR SERPLBLD BASED ON 1.73 SQ M-ARVRAT: 60 ML/MIN/1.73M2 (ref 60–?)
GLOBULIN PLAS-MCNC: 3.3 G/DL (ref 2.8–4.4)
GLUCOSE BLD-MCNC: 100 MG/DL (ref 70–99)
MAGNESIUM SERPL-MCNC: 1.8 MG/DL (ref 1.6–2.6)
OSMOLALITY SERPL CALC.SUM OF ELEC: 284 MOSM/KG (ref 275–295)
POTASSIUM SERPL-SCNC: 3.5 MMOL/L (ref 3.5–5.1)
PROT SERPL-MCNC: 6.8 G/DL (ref 6.4–8.2)
SODIUM SERPL-SCNC: 136 MMOL/L (ref 136–145)

## 2023-01-16 PROCEDURE — 83735 ASSAY OF MAGNESIUM: CPT

## 2023-01-16 PROCEDURE — 80053 COMPREHEN METABOLIC PANEL: CPT

## 2023-01-16 PROCEDURE — 36415 COLL VENOUS BLD VENIPUNCTURE: CPT

## 2023-01-19 ENCOUNTER — TELEPHONE (OUTPATIENT)
Dept: INTERNAL MEDICINE CLINIC | Facility: CLINIC | Age: 83
End: 2023-01-19

## 2023-01-24 RX ORDER — LISINOPRIL 10 MG/1
20 TABLET ORAL DAILY
Qty: 180 TABLET | Refills: 0 | Status: SHIPPED | OUTPATIENT
Start: 2023-01-24

## 2023-01-24 NOTE — TELEPHONE ENCOUNTER
Please review. Protocol failed or has no protocol.      Requested Prescriptions   Pending Prescriptions Disp Refills    LISINOPRIL 10 MG Oral Tab [Pharmacy Med Name: Lisinopril 10 MG Oral Tablet] 90 tablet 0     Sig: TAKE 1 TABLET BY MOUTH ONCE DAILY IN  ADDITION  TO  LISINOPRIL/HYDROCHLOROTHIAZIDE  20/25MG  EVERY  DAY       Hypertensive Medications Protocol Failed - 1/23/2023  2:27 PM        Failed - Last BP reading less than 140/90     BP Readings from Last 1 Encounters:  01/11/23 : 158/80              Passed - In person appointment in the past 12 or next 3 months     Recent Outpatient Visits              1 week ago Essential hypertension    [address] [facility], Keyana Elias MD    Office Visit    2 months ago Essential hypertension    [facility]ile[facility] Avila, Mike Day MD    Office Visit    3 months ago Essential hypertension    [facility] Avila, Prosper Jarrett MD    Office Visit    3 months ago Left superior oblique palsy    [address] [facility], Quinten Rdz MD    Office Visit    3 months ago Age-related nuclear cataract of both eyes    [address] [facility], Endy Cade MD    Office Visit          Future Appointments         Provider Department Appt Notes    In 3 months MD Thuy Hidalgo, [address] [facility]                Passed - CMP or BMP in past 6 months     Recent Results (from the past 4392 hour(s))   COMP METABOLIC PANEL (14)    Collection Time: 01/16/23  9:33 AM   Result Value Ref Range    Glucose 100 (H) 70 - 99 mg/dL    Sodium 136 136 - 145 mmol/L    Potassium 3.5 3.5 - 5.1 mmol/L    Chloride 100 98 - 112 mmol/L    CO2 28.0 21.0 - 32.0 mmol/L    Anion Gap 8 0 - 18 mmol/L    BUN 17 7 - 18 mg/dL    Creatinine 0.94 0.55 - 1.02 mg/dL    BUN/CREA Ratio 18.1 10.0 - 20.0    Calcium, Total 9.5 8.5 - 10.1 mg/dL    Calculated Osmolality 284 275 - 295 mOsm/kg    eGFR-Cr 60 >=60 mL/min/1.73m2    ALT 22 13 - 56 U/L    AST 23 15 - 37 U/L    Alkaline Phosphatase 65 55 - 142 U/L    Bilirubin, Total 0.5 0.1 - 2.0 mg/dL    Total Protein 6.8 6.4 - 8.2 g/dL    Albumin 3.5 3.4 - 5.0 g/dL    Globulin  3.3 2.8 - 4.4 g/dL    A/G Ratio 1.1 1.0 - 2.0    Patient Fasting for CMP? Yes      *Note: Due to a large number of results and/or encounters for the requested time period, some results have not been displayed. A complete set of results can be found in Results Review.                Passed - In person appointment or virtual visit in the past 6 months     Recent Outpatient Visits              1 week ago Essential hypertension    Yousif Tanner MD    Office Visit    2 months ago Essential hypertension    Paloma Tsang MD    Office Visit    3 months ago Essential hypertension    Prosper Tsang MD    Office Visit    3 months ago Left superior oblique palsy    Eden Tanner MD    Office Visit    3 months ago Age-related nuclear cataract of both eyes    Carmen Tanner MD    Office Visit          Future Appointments         Provider Department Appt Notes    In 3 months MD Saurabh Bañuelos, [address] [facility] recheck prism                Passed - EGFRCR or GFRNAA > 50     GFR Evaluation  EGFRCR: 60 , resulted on 1/16/2023               Recent Outpatient Visits              1 week ago Essential hypertension    Maxene Finn, [place] Mould, MD    Office Visit    2 months ago [facility]    [facility]    Office Visit    3 months ago Essential hypertension    Dank Almeida, Prosper Whalen MD    Office Visit    3 months ago Left superior oblique palsy    Nasima Quintana MD    Office Visit    3 months ago Age-related nuclear cataract of both eyes    Nova Quintana MD    Office Visit            Future Appointments         Provider Department Appt Notes    In 3 months MD Danielle Izaguirre, [address] [facility] prism

## 2023-01-26 ENCOUNTER — OFFICE VISIT (OUTPATIENT)
Dept: INTERNAL MEDICINE CLINIC | Facility: CLINIC | Age: 83
End: 2023-01-26

## 2023-01-26 VITALS
WEIGHT: 125 LBS | BODY MASS INDEX: 26.24 KG/M2 | HEIGHT: 58 IN | HEART RATE: 58 BPM | OXYGEN SATURATION: 98 % | DIASTOLIC BLOOD PRESSURE: 62 MMHG | SYSTOLIC BLOOD PRESSURE: 148 MMHG

## 2023-01-26 DIAGNOSIS — I10 ESSENTIAL HYPERTENSION: Primary | ICD-10-CM

## 2023-01-26 DIAGNOSIS — R79.0 LOW MAGNESIUM LEVEL: ICD-10-CM

## 2023-01-26 DIAGNOSIS — E87.1 LOW SODIUM LEVELS: ICD-10-CM

## 2023-01-26 DIAGNOSIS — R94.4 DECREASED GFR: ICD-10-CM

## 2023-01-26 DIAGNOSIS — I48.20 ATRIAL FIBRILLATION, CHRONIC (HCC): ICD-10-CM

## 2023-01-26 PROCEDURE — 99214 OFFICE O/P EST MOD 30 MIN: CPT | Performed by: INTERNAL MEDICINE

## 2023-01-26 PROCEDURE — 3078F DIAST BP <80 MM HG: CPT | Performed by: INTERNAL MEDICINE

## 2023-01-26 PROCEDURE — 1126F AMNT PAIN NOTED NONE PRSNT: CPT | Performed by: INTERNAL MEDICINE

## 2023-01-26 PROCEDURE — 3008F BODY MASS INDEX DOCD: CPT | Performed by: INTERNAL MEDICINE

## 2023-01-26 PROCEDURE — 3077F SYST BP >= 140 MM HG: CPT | Performed by: INTERNAL MEDICINE

## 2023-02-21 ENCOUNTER — MED REC SCAN ONLY (OUTPATIENT)
Dept: INTERNAL MEDICINE CLINIC | Facility: CLINIC | Age: 83
End: 2023-02-21

## 2023-03-23 DIAGNOSIS — I10 ESSENTIAL HYPERTENSION: ICD-10-CM

## 2023-03-24 NOTE — TELEPHONE ENCOUNTER
Please review. Protocol failed / No protocol. Requested Prescriptions   Pending Prescriptions Disp Refills    LISINOPRIL-HYDROCHLOROTHIAZIDE 20-25 MG Oral Tab [Pharmacy Med Name: Lisinopril-hydroCHLOROthiazide 20-25 MG Oral Tablet] 90 tablet 0     Sig: TAKE 1 TABLET BY MOUTH ONCE DAILY .   TAKE  IN  ADDITION  TO  LISINOPRIL  10MG       Hypertensive Medications Protocol Failed - 3/23/2023 11:35 AM        Failed - Last BP reading less than 140/90     BP Readings from Last 1 Encounters:  01/26/23 : 148/62              Passed - In person appointment in the past 12 or next 3 months     Recent Outpatient Visits              1 month ago Essential hypertension    Flip Oliver MD    Office Visit    2 months ago Essential hypertension    Erick Alvarado, Abbe Dennison MD    Office Visit    4 months ago Essential hypertension    Flip Oliver MD    Office Visit    5 months ago Essential hypertension    Dank Almeida, Prosper Whalen MD    Office Visit    5 months ago Left superior oblique palsy    Erick Alvarado, Nasima Lindo MD    Office Visit          Future Appointments         Provider Department Appt Notes    In 4 days MD Danielle Jones [place]     In 1 month MD Danielle Izaguirre, [address] [facility] prism                Passed - CMP or BMP in past 6 months     Recent Results (from the past 4392 hour(s))   COMP METABOLIC PANEL (14)    Collection Time: 01/16/23  9:33 AM   Result Value Ref Range    Glucose 100 (H) 70 - 99 mg/dL    Sodium 136 136 - 145 mmol/L    Potassium 3.5 3.5 - 5.1 mmol/L    Chloride 100 98 - 112 mmol/L    CO2 28.0 21.0 - 32.0 mmol/L    Anion Gap 8 0 - 18 mmol/L    BUN 17 7 - 18 mg/dL    Creatinine 0.94 0.55 - 1.02 mg/dL    BUN/CREA Ratio 18.1 10.0 - 20.0    Calcium, Total 9.5 8.5 - 10.1 mg/dL    Calculated Osmolality 284 275 - 295 mOsm/kg    eGFR-Cr 60 >=60 mL/min/1.73m2    ALT 22 13 - 56 U/L    AST 23 15 - 37 U/L    Alkaline Phosphatase 65 55 - 142 U/L    Bilirubin, Total 0.5 0.1 - 2.0 mg/dL    Total Protein 6.8 6.4 - 8.2 g/dL    Albumin 3.5 3.4 - 5.0 g/dL    Globulin  3.3 2.8 - 4.4 g/dL    A/G Ratio 1.1 1.0 - 2.0    Patient Fasting for CMP? Yes      *Note: Due to a large number of results and/or encounters for the requested time period, some results have not been displayed. A complete set of results can be found in Results Review.                Passed - In person appointment or virtual visit in the past 6 months     Recent Outpatient Visits              1 month ago Essential hypertension    [facility]    Office Visit    2 months ago Essential hypertension    Richelle Kwan MD    Office Visit    4 months ago Essential hypertension    [facility] [facility]    Office Visit    5 months ago Essential hypertension    [facility] [facility]    Office Visit    5 months ago Left superior oblique palsy    Jacobo Kwan MD    Office Visit          Future Appointments         Provider Department Appt Notes    In 4 days Jillian Powell MD [address], [address] [facility]     In 1 month Evin Mejia MD [address] [facility] recheck prism                Passed - EGFRCR or GFRNAA > 50     GFR Evaluation  EGFRCR: 60 , resulted on 1/16/2023               Recent Outpatient Visits              1 month ago Essential hypertension    Dannie Hill MD    Office Visit    2 months ago Essential hypertension    Noah Major, Kasey Ponce MD    Office Visit    4 months ago Essential hypertension    Prosper Barnett MD    Office Visit    5 months ago Essential hypertension    Prosper Barnett MD    Office Visit    5 months ago Left superior oblique palsy    Noah Major, Luis Castro MD    Office Visit            Future Appointments         Provider Department Appt Notes    In 4 days Nitin Moreno MD [address], [address] [facility] 143     In 1 month Yanely Mayfield MD [address], [address] [facility]

## 2023-03-25 RX ORDER — LISINOPRIL AND HYDROCHLOROTHIAZIDE 25; 20 MG/1; MG/1
TABLET ORAL
Qty: 90 TABLET | Refills: 0 | Status: SHIPPED | OUTPATIENT
Start: 2023-03-25

## 2023-03-27 ENCOUNTER — OFFICE VISIT (OUTPATIENT)
Dept: INTERNAL MEDICINE CLINIC | Facility: CLINIC | Age: 83
End: 2023-03-27

## 2023-03-27 VITALS
OXYGEN SATURATION: 100 % | WEIGHT: 121 LBS | BODY MASS INDEX: 25.4 KG/M2 | SYSTOLIC BLOOD PRESSURE: 138 MMHG | HEART RATE: 61 BPM | HEIGHT: 58 IN | DIASTOLIC BLOOD PRESSURE: 66 MMHG

## 2023-03-27 DIAGNOSIS — Z78.0 POSTMENOPAUSAL: ICD-10-CM

## 2023-03-27 DIAGNOSIS — Z00.00 MEDICARE ANNUAL WELLNESS VISIT, SUBSEQUENT: Primary | ICD-10-CM

## 2023-03-27 DIAGNOSIS — I10 ESSENTIAL HYPERTENSION: ICD-10-CM

## 2023-03-27 PROCEDURE — G0439 PPPS, SUBSEQ VISIT: HCPCS | Performed by: INTERNAL MEDICINE

## 2023-03-27 PROCEDURE — 3075F SYST BP GE 130 - 139MM HG: CPT | Performed by: INTERNAL MEDICINE

## 2023-03-27 PROCEDURE — 3078F DIAST BP <80 MM HG: CPT | Performed by: INTERNAL MEDICINE

## 2023-03-27 PROCEDURE — 99397 PER PM REEVAL EST PAT 65+ YR: CPT | Performed by: INTERNAL MEDICINE

## 2023-03-27 PROCEDURE — 3008F BODY MASS INDEX DOCD: CPT | Performed by: INTERNAL MEDICINE

## 2023-03-27 PROCEDURE — 1125F AMNT PAIN NOTED PAIN PRSNT: CPT | Performed by: INTERNAL MEDICINE

## 2023-03-27 PROCEDURE — 96160 PT-FOCUSED HLTH RISK ASSMT: CPT | Performed by: INTERNAL MEDICINE

## 2023-03-31 DIAGNOSIS — G50.0 SUPRAORBITAL NEURALGIA: ICD-10-CM

## 2023-03-31 DIAGNOSIS — R20.2 PARESTHESIA: ICD-10-CM

## 2023-03-31 DIAGNOSIS — G25.81 RLS (RESTLESS LEGS SYNDROME): ICD-10-CM

## 2023-03-31 RX ORDER — GABAPENTIN 300 MG/1
CAPSULE ORAL
Qty: 270 CAPSULE | Refills: 0 | OUTPATIENT
Start: 2023-03-31

## 2023-03-31 NOTE — TELEPHONE ENCOUNTER
Medication: Gabapentin 300mg, 1 po TID    LOV: 8/9/22  NOV: none    Last refill/[place]: 8/9/22 (#270/3RF)  1 Year supply sent on 8/9/22

## 2023-04-01 DIAGNOSIS — G25.81 RLS (RESTLESS LEGS SYNDROME): ICD-10-CM

## 2023-04-01 DIAGNOSIS — G50.0 SUPRAORBITAL NEURALGIA: ICD-10-CM

## 2023-04-01 DIAGNOSIS — R20.2 PARESTHESIA: ICD-10-CM

## 2023-04-03 RX ORDER — GABAPENTIN 300 MG/1
CAPSULE ORAL
Qty: 270 CAPSULE | Refills: 0 | OUTPATIENT
Start: 2023-04-03

## 2023-04-11 ENCOUNTER — HOSPITAL ENCOUNTER (OUTPATIENT)
Dept: BONE DENSITY | Facility: HOSPITAL | Age: 83
Discharge: HOME | End: 2023-04-11
Attending: INTERNAL MEDICINE
Payer: MEDICARE

## 2023-04-11 DIAGNOSIS — Z78.0 POSTMENOPAUSAL: ICD-10-CM

## 2023-04-11 PROCEDURE — 77080 DXA BONE DENSITY AXIAL: CPT | Performed by: INTERNAL MEDICINE

## 2023-04-21 ENCOUNTER — TELEPHONE (OUTPATIENT)
Dept: INTERNAL MEDICINE CLINIC | Facility: CLINIC | Age: 83
End: 2023-04-21

## 2023-04-21 ENCOUNTER — LAB ENCOUNTER (OUTPATIENT)
Dept: LAB | Facility: HOSPITAL | Age: 83
End: 2023-04-21
Attending: INTERNAL MEDICINE
Payer: MEDICARE

## 2023-04-21 DIAGNOSIS — I10 ESSENTIAL HYPERTENSION: ICD-10-CM

## 2023-04-21 LAB
ALBUMIN SERPL-MCNC: 3.4 G/DL (ref 3.4–5)
ALBUMIN/GLOB SERPL: 0.9 {RATIO} (ref 1–2)
ALP LIVER SERPL-CCNC: 75 U/L
ALT SERPL-CCNC: 35 U/L
ANION GAP SERPL CALC-SCNC: 6 MMOL/L (ref 0–18)
AST SERPL-CCNC: 42 U/L (ref 15–37)
BILIRUB SERPL-MCNC: 0.9 MG/DL (ref 0.1–2)
BUN BLD-MCNC: 17 MG/DL (ref 7–18)
BUN/CREAT SERPL: 16.2 (ref 10–20)
CALCIUM BLD-MCNC: 9.4 MG/DL (ref 8.5–10.1)
CHLORIDE SERPL-SCNC: 98 MMOL/L (ref 98–112)
CHOLEST SERPL-MCNC: 160 MG/DL (ref ?–200)
CO2 SERPL-SCNC: 32 MMOL/L (ref 21–32)
CREAT BLD-MCNC: 1.05 MG/DL
FASTING PATIENT LIPID ANSWER: YES
FASTING STATUS PATIENT QL REPORTED: YES
GFR SERPLBLD BASED ON 1.73 SQ M-ARVRAT: 53 ML/MIN/1.73M2 (ref 60–?)
GLOBULIN PLAS-MCNC: 3.7 G/DL (ref 2.8–4.4)
GLUCOSE BLD-MCNC: 106 MG/DL (ref 70–99)
HDLC SERPL-MCNC: 59 MG/DL (ref 40–59)
LDLC SERPL CALC-MCNC: 87 MG/DL (ref ?–100)
NONHDLC SERPL-MCNC: 101 MG/DL (ref ?–130)
OSMOLALITY SERPL CALC.SUM OF ELEC: 284 MOSM/KG (ref 275–295)
POTASSIUM SERPL-SCNC: 3.8 MMOL/L (ref 3.5–5.1)
PROT SERPL-MCNC: 7.1 G/DL (ref 6.4–8.2)
SODIUM SERPL-SCNC: 136 MMOL/L (ref 136–145)
TRIGL SERPL-MCNC: 71 MG/DL (ref 30–149)
TSI SER-ACNC: 2.16 MIU/ML (ref 0.36–3.74)
VLDLC SERPL CALC-MCNC: 11 MG/DL (ref 0–30)

## 2023-04-21 PROCEDURE — 36415 COLL VENOUS BLD VENIPUNCTURE: CPT

## 2023-04-21 PROCEDURE — 84443 ASSAY THYROID STIM HORMONE: CPT

## 2023-04-21 PROCEDURE — 80061 LIPID PANEL: CPT

## 2023-04-21 PROCEDURE — 80053 COMPREHEN METABOLIC PANEL: CPT

## 2023-04-24 ENCOUNTER — OFFICE VISIT (OUTPATIENT)
Dept: OPHTHALMOLOGY | Facility: CLINIC | Age: 83
End: 2023-04-24

## 2023-04-24 DIAGNOSIS — H50.00 ESOTROPIA: ICD-10-CM

## 2023-04-24 DIAGNOSIS — H53.2 VERTICAL DIPLOPIA: ICD-10-CM

## 2023-04-24 DIAGNOSIS — H25.13 AGE-RELATED NUCLEAR CATARACT OF BOTH EYES: ICD-10-CM

## 2023-04-24 DIAGNOSIS — H52.4 PRESBYOPIA OF BOTH EYES: ICD-10-CM

## 2023-04-24 DIAGNOSIS — H49.12 LEFT SUPERIOR OBLIQUE PALSY: Primary | ICD-10-CM

## 2023-04-24 DIAGNOSIS — G89.29 CHRONIC FACIAL PAIN: ICD-10-CM

## 2023-04-24 DIAGNOSIS — R51.9 CHRONIC FACIAL PAIN: ICD-10-CM

## 2023-04-24 DIAGNOSIS — H16.143 SPK (SUPERFICIAL PUNCTATE KERATITIS), BILATERAL: ICD-10-CM

## 2023-04-24 PROCEDURE — 92014 COMPRE OPH EXAM EST PT 1/>: CPT | Performed by: OPHTHALMOLOGY

## 2023-04-24 PROCEDURE — 92060 SENSORIMOTOR EXAMINATION: CPT | Performed by: OPHTHALMOLOGY

## 2023-04-24 NOTE — ASSESSMENT & PLAN NOTE
Measures 4ET at distance. We added 4 base out Fresnel prism over the left lens of glasses. Patient likes the additional prism. Sees single.

## 2023-04-24 NOTE — ASSESSMENT & PLAN NOTE
Diagnosis discussed with patient. Use preservative free  artificial tears (any over the counter brand is okay) up to 4 times per day as needed for dry eye symptoms. Systane information given.

## 2023-04-24 NOTE — ASSESSMENT & PLAN NOTE
LSO seems stable. Continue with same ground in prism glasses. 4.0 base up ground in right lens   3.0 base down ground in the left lens    We added 4 base out over the right lens of glasses for Esotropia in the distance. Patient see's single with this additional prism.

## 2023-04-24 NOTE — ASSESSMENT & PLAN NOTE
1 eye lash was removed form the left lower lid in the office with no complications. Continue to follow up with Dr. Ale Smith and Dr. Merlin Rodriguez for pain.

## 2023-04-24 NOTE — PATIENT INSTRUCTIONS
SPK (superficial punctate keratitis), bilateral  Diagnosis discussed with patient. Use preservative free  artificial tears (any over the counter brand is okay) up to 4 times per day as needed for dry eye symptoms. Systane information given. Left superior oblique palsy  LSO seems stable. Continue with same ground in prism glasses. 4.0 base up ground in right lens   3.0 base down ground in the left lens    We added 4 base out over the right lens of glasses for Esotropia in the distance. Patient see's single with this additional prism. Esotropia   Measures 4ET at distance. We added 4 base out Fresnel prism over the left lens of glasses. Patient likes the additional prism. Sees single. Vertical diplopia  Vertical diplopia is secondary to Left Superior Oblique Palsy onset 8/2021 and is treated with ground in vertical prisms. Will continue same vertical prism. Age-related nuclear cataract of both eyes  Mild, no treatment. Chronic facial pain  No ocular cause for facial and periorbital pain. Patient has been under the care of  Dr. Raymundo Ridley, Neurology, for 1 year or so for the pain and Restless legs syndrome. Has upcoming appointment with Dr. Raymundo Ridley in May. Presbyopia of both eyes  Same progressive bifocals with ground in prism.

## 2023-04-24 NOTE — TELEPHONE ENCOUNTER
LMTCB.  When pt calls back pls inquire what is the reason she is seeing Dr. Aron Quijano for today.  Is this a routine eye exam?

## 2023-04-24 NOTE — ASSESSMENT & PLAN NOTE
Vertical diplopia is secondary to Left Superior Oblique Palsy onset 8/2021 and is treated with ground in vertical prisms. Will continue same vertical prism.

## 2023-04-25 PROBLEM — G89.29 CHRONIC FACIAL PAIN: Status: ACTIVE | Noted: 2023-04-25

## 2023-04-25 PROBLEM — R51.9 CHRONIC FACIAL PAIN: Status: ACTIVE | Noted: 2023-04-25

## 2023-04-25 PROBLEM — H57.12 LEFT EYE PAIN: Status: RESOLVED | Noted: 2022-10-11 | Resolved: 2023-04-25

## 2023-04-25 NOTE — ASSESSMENT & PLAN NOTE
No ocular cause for facial and periorbital pain. Patient has been under the care of  Dr. Dave Plaza, Neurology, for 1 year or so for the pain and Restless legs syndrome. Has upcoming appointment with Dr. Dave Plaza in May.

## 2023-04-26 RX ORDER — LISINOPRIL 10 MG/1
20 TABLET ORAL DAILY
Qty: 180 TABLET | Refills: 3 | Status: SHIPPED | OUTPATIENT
Start: 2023-04-26

## 2023-05-03 ENCOUNTER — TELEPHONE (OUTPATIENT)
Dept: PHYSICAL MEDICINE AND REHAB | Facility: CLINIC | Age: 83
End: 2023-05-03

## 2023-05-03 ENCOUNTER — OFFICE VISIT (OUTPATIENT)
Dept: NEUROLOGY | Facility: CLINIC | Age: 83
End: 2023-05-03
Payer: MEDICARE

## 2023-05-03 DIAGNOSIS — R20.2 PARESTHESIA: ICD-10-CM

## 2023-05-03 DIAGNOSIS — G25.81 RLS (RESTLESS LEGS SYNDROME): ICD-10-CM

## 2023-05-03 DIAGNOSIS — G50.0 SUPRAORBITAL NEURALGIA: ICD-10-CM

## 2023-05-03 PROCEDURE — 64400 NJX AA&/STRD TRIGEMINAL NRV: CPT | Performed by: OTHER

## 2023-05-03 PROCEDURE — 1160F RVW MEDS BY RX/DR IN RCRD: CPT | Performed by: OTHER

## 2023-05-03 PROCEDURE — 1159F MED LIST DOCD IN RCRD: CPT | Performed by: OTHER

## 2023-05-03 PROCEDURE — 99213 OFFICE O/P EST LOW 20 MIN: CPT | Performed by: OTHER

## 2023-05-03 RX ORDER — GABAPENTIN 300 MG/1
CAPSULE ORAL
Qty: 270 CAPSULE | Refills: 3 | Status: SHIPPED | OUTPATIENT
Start: 2023-05-03 | End: 2023-05-04

## 2023-05-03 RX ORDER — TRIAMCINOLONE ACETONIDE 40 MG/ML
40 INJECTION, SUSPENSION INTRA-ARTICULAR; INTRAMUSCULAR ONCE
Status: COMPLETED | OUTPATIENT
Start: 2023-05-03 | End: 2023-05-03

## 2023-05-03 RX ORDER — LIDOCAINE HYDROCHLORIDE 10 MG/ML
1 INJECTION, SOLUTION INFILTRATION; PERINEURAL ONCE
Status: COMPLETED | OUTPATIENT
Start: 2023-05-03 | End: 2023-05-03

## 2023-05-03 NOTE — TELEPHONE ENCOUNTER
Per procedure note dated today, Dr. Susanne Vasquez performed a Left supraorbital nerve Block     Initiated authorization for Left supraorbital nerve Block CPT [id number], [id number] with [facility]  Reference #[id number]  Status: Approved with authorization #[id number] valid 5/3/23    Procedure performed in office

## 2023-05-04 ENCOUNTER — TELEPHONE (OUTPATIENT)
Dept: NEUROLOGY | Facility: CLINIC | Age: 83
End: 2023-05-04

## 2023-05-04 DIAGNOSIS — R20.2 PARESTHESIA: ICD-10-CM

## 2023-05-04 DIAGNOSIS — G25.81 RLS (RESTLESS LEGS SYNDROME): ICD-10-CM

## 2023-05-04 DIAGNOSIS — G50.0 SUPRAORBITAL NEURALGIA: ICD-10-CM

## 2023-05-04 RX ORDER — GABAPENTIN 300 MG/1
CAPSULE ORAL
Qty: 270 CAPSULE | Refills: 3 | Status: SHIPPED | OUTPATIENT
Start: 2023-05-04

## 2023-05-04 NOTE — TELEPHONE ENCOUNTER
[facility] this [facility]:  gabapentin 300 MG Oral Cap

## 2023-05-11 ENCOUNTER — MED REC SCAN ONLY (OUTPATIENT)
Dept: INTERNAL MEDICINE CLINIC | Facility: CLINIC | Age: 83
End: 2023-05-11

## 2023-05-22 ENCOUNTER — HOSPITAL ENCOUNTER (INPATIENT)
Facility: HOSPITAL | Age: 83
LOS: 1 days | Discharge: HOME | End: 2023-05-24
Attending: EMERGENCY MEDICINE | Admitting: HOSPITALIST
Payer: MEDICARE

## 2023-05-22 ENCOUNTER — HOSPITAL ENCOUNTER (INPATIENT)
Facility: HOSPITAL | Age: 83
LOS: 1 days | Discharge: HOME | DRG: 871 | End: 2023-05-24
Attending: EMERGENCY MEDICINE | Admitting: HOSPITALIST
Payer: MEDICARE

## 2023-05-22 ENCOUNTER — APPOINTMENT (OUTPATIENT)
Dept: CT IMAGING | Facility: HOSPITAL | Age: 83
End: 2023-05-22
Attending: EMERGENCY MEDICINE
Payer: MEDICARE

## 2023-05-22 ENCOUNTER — APPOINTMENT (OUTPATIENT)
Dept: CT IMAGING | Facility: HOSPITAL | Age: 83
DRG: 871 | End: 2023-05-22
Attending: EMERGENCY MEDICINE
Payer: MEDICARE

## 2023-05-22 DIAGNOSIS — A41.9 SEPSIS DUE TO UNDETERMINED ORGANISM (HCC): Primary | ICD-10-CM

## 2023-05-22 DIAGNOSIS — N30.00 ACUTE CYSTITIS WITHOUT HEMATURIA: ICD-10-CM

## 2023-05-22 LAB
ANION GAP SERPL CALC-SCNC: 10 MMOL/L (ref 0–18)
BASOPHILS # BLD AUTO: 0.04 X10(3) UL (ref 0–0.2)
BASOPHILS NFR BLD AUTO: 0.4 %
BILIRUB UR QL: NEGATIVE
BUN BLD-MCNC: 24 MG/DL (ref 7–18)
BUN/CREAT SERPL: 19.2 (ref 10–20)
CALCIUM BLD-MCNC: 8.6 MG/DL (ref 8.5–10.1)
CHLORIDE SERPL-SCNC: 95 MMOL/L (ref 98–112)
CO2 SERPL-SCNC: 25 MMOL/L (ref 21–32)
COLOR UR: YELLOW
CREAT BLD-MCNC: 1.25 MG/DL
DEPRECATED RDW RBC AUTO: 42.4 FL (ref 35.1–46.3)
EOSINOPHIL # BLD AUTO: 0.01 X10(3) UL (ref 0–0.7)
EOSINOPHIL NFR BLD AUTO: 0.1 %
ERYTHROCYTE [DISTWIDTH] IN BLOOD BY AUTOMATED COUNT: 14 % (ref 11–15)
GFR SERPLBLD BASED ON 1.73 SQ M-ARVRAT: 43 ML/MIN/1.73M2 (ref 60–?)
GLUCOSE BLD-MCNC: 117 MG/DL (ref 70–99)
GLUCOSE BLDC GLUCOMTR-MCNC: 120 MG/DL (ref 70–99)
GLUCOSE UR-MCNC: NORMAL MG/DL
HCT VFR BLD AUTO: 37.4 %
HGB BLD-MCNC: 12.6 G/DL
IMM GRANULOCYTES # BLD AUTO: 0.03 X10(3) UL (ref 0–1)
IMM GRANULOCYTES NFR BLD: 0.3 %
KETONES UR-MCNC: NEGATIVE MG/DL
LACTATE SERPL-SCNC: 1 MMOL/L (ref 0.4–2)
LEUKOCYTE ESTERASE UR QL STRIP.AUTO: 500
LYMPHOCYTES # BLD AUTO: 0.69 X10(3) UL (ref 1–4)
LYMPHOCYTES NFR BLD AUTO: 6.6 %
MCH RBC QN AUTO: 28.1 PG (ref 26–34)
MCHC RBC AUTO-ENTMCNC: 33.7 G/DL (ref 31–37)
MCV RBC AUTO: 83.3 FL
MONOCYTES # BLD AUTO: 0.88 X10(3) UL (ref 0.1–1)
MONOCYTES NFR BLD AUTO: 8.4 %
NEUTROPHILS # BLD AUTO: 8.85 X10 (3) UL (ref 1.5–7.7)
NEUTROPHILS # BLD AUTO: 8.85 X10(3) UL (ref 1.5–7.7)
NEUTROPHILS NFR BLD AUTO: 84.2 %
NITRITE UR QL STRIP.AUTO: NEGATIVE
OSMOLALITY SERPL CALC.SUM OF ELEC: 275 MOSM/KG (ref 275–295)
PH UR: 6 [PH] (ref 5–8)
PLATELET # BLD AUTO: 234 10(3)UL (ref 150–450)
POTASSIUM SERPL-SCNC: 3.3 MMOL/L (ref 3.5–5.1)
PROT UR-MCNC: 50 MG/DL
RBC # BLD AUTO: 4.49 X10(6)UL
RBC #/AREA URNS AUTO: >10 /HPF
SODIUM SERPL-SCNC: 130 MMOL/L (ref 136–145)
SP GR UR STRIP: 1.01 (ref 1–1.03)
UROBILINOGEN UR STRIP-ACNC: NORMAL
WBC # BLD AUTO: 10.5 X10(3) UL (ref 4–11)
WBC #/AREA URNS AUTO: >50 /HPF
WBC CLUMPS UR QL AUTO: PRESENT /HPF

## 2023-05-22 PROCEDURE — 99223 1ST HOSP IP/OBS HIGH 75: CPT | Performed by: HOSPITALIST

## 2023-05-22 PROCEDURE — 74176 CT ABD & PELVIS W/O CONTRAST: CPT | Performed by: EMERGENCY MEDICINE

## 2023-05-22 RX ORDER — METOPROLOL TARTRATE 5 MG/5ML
5 INJECTION INTRAVENOUS ONCE
Status: COMPLETED | OUTPATIENT
Start: 2023-05-22 | End: 2023-05-22

## 2023-05-22 NOTE — ED INITIAL ASSESSMENT (HPI)
79 y/o female here for evaluation of ams x 2 days and weakness per family.  Pt arrives via ems, bg 114, 101.5 oral temp on arrival.

## 2023-05-23 PROBLEM — N30.00 ACUTE CYSTITIS WITHOUT HEMATURIA: Status: ACTIVE | Noted: 2023-05-23

## 2023-05-23 LAB
ANION GAP SERPL CALC-SCNC: 4 MMOL/L (ref 0–18)
ATRIAL RATE: 98 BPM
BASOPHILS # BLD AUTO: 0.03 X10(3) UL (ref 0–0.2)
BASOPHILS NFR BLD AUTO: 0.3 %
BUN BLD-MCNC: 17 MG/DL (ref 7–18)
BUN/CREAT SERPL: 16.3 (ref 10–20)
CALCIUM BLD-MCNC: 8.5 MG/DL (ref 8.5–10.1)
CHLORIDE SERPL-SCNC: 104 MMOL/L (ref 98–112)
CO2 SERPL-SCNC: 27 MMOL/L (ref 21–32)
CREAT BLD-MCNC: 1.04 MG/DL
DEPRECATED RDW RBC AUTO: 44.6 FL (ref 35.1–46.3)
EOSINOPHIL # BLD AUTO: 0.01 X10(3) UL (ref 0–0.7)
EOSINOPHIL NFR BLD AUTO: 0.1 %
ERYTHROCYTE [DISTWIDTH] IN BLOOD BY AUTOMATED COUNT: 14.3 % (ref 11–15)
GFR SERPLBLD BASED ON 1.73 SQ M-ARVRAT: 53 ML/MIN/1.73M2 (ref 60–?)
GLUCOSE BLD-MCNC: 104 MG/DL (ref 70–99)
HCT VFR BLD AUTO: 33.3 %
HGB BLD-MCNC: 11.1 G/DL
IMM GRANULOCYTES # BLD AUTO: 0.03 X10(3) UL (ref 0–1)
IMM GRANULOCYTES NFR BLD: 0.3 %
LYMPHOCYTES # BLD AUTO: 0.73 X10(3) UL (ref 1–4)
LYMPHOCYTES NFR BLD AUTO: 8.4 %
MCH RBC QN AUTO: 28.4 PG (ref 26–34)
MCHC RBC AUTO-ENTMCNC: 33.3 G/DL (ref 31–37)
MCV RBC AUTO: 85.2 FL
MONOCYTES # BLD AUTO: 0.82 X10(3) UL (ref 0.1–1)
MONOCYTES NFR BLD AUTO: 9.5 %
NEUTROPHILS # BLD AUTO: 7.05 X10 (3) UL (ref 1.5–7.7)
NEUTROPHILS # BLD AUTO: 7.05 X10(3) UL (ref 1.5–7.7)
NEUTROPHILS NFR BLD AUTO: 81.4 %
OSMOLALITY SERPL CALC.SUM OF ELEC: 282 MOSM/KG (ref 275–295)
P AXIS: 36 DEGREES
P-R INTERVAL: 148 MS
PLATELET # BLD AUTO: 176 10(3)UL (ref 150–450)
POTASSIUM SERPL-SCNC: 3.7 MMOL/L (ref 3.5–5.1)
Q-T INTERVAL: 334 MS
QRS DURATION: 78 MS
QTC CALCULATION (BEZET): 426 MS
R AXIS: -16 DEGREES
RBC # BLD AUTO: 3.91 X10(6)UL
SODIUM SERPL-SCNC: 135 MMOL/L (ref 136–145)
T AXIS: 29 DEGREES
VENTRICULAR RATE: 98 BPM
WBC # BLD AUTO: 8.7 X10(3) UL (ref 4–11)

## 2023-05-23 PROCEDURE — 99233 SBSQ HOSP IP/OBS HIGH 50: CPT | Performed by: HOSPITALIST

## 2023-05-23 RX ORDER — POTASSIUM CHLORIDE 20 MEQ/1
40 TABLET, EXTENDED RELEASE ORAL ONCE
Status: COMPLETED | OUTPATIENT
Start: 2023-05-23 | End: 2023-05-23

## 2023-05-23 RX ORDER — MELATONIN
3 NIGHTLY
Status: DISCONTINUED | OUTPATIENT
Start: 2023-05-23 | End: 2023-05-24

## 2023-05-23 RX ORDER — GABAPENTIN 300 MG/1
300 CAPSULE ORAL 3 TIMES DAILY
Status: DISCONTINUED | OUTPATIENT
Start: 2023-05-23 | End: 2023-05-24

## 2023-05-23 RX ORDER — HEPARIN SODIUM 5000 [USP'U]/ML
5000 INJECTION, SOLUTION INTRAVENOUS; SUBCUTANEOUS EVERY 12 HOURS SCHEDULED
Status: DISCONTINUED | OUTPATIENT
Start: 2023-05-23 | End: 2023-05-23

## 2023-05-23 RX ORDER — ACETAMINOPHEN 325 MG/1
650 TABLET ORAL EVERY 4 HOURS PRN
Status: DISCONTINUED | OUTPATIENT
Start: 2023-05-23 | End: 2023-05-24

## 2023-05-23 RX ORDER — HYDROCODONE BITARTRATE AND ACETAMINOPHEN 5; 325 MG/1; MG/1
1 TABLET ORAL EVERY 4 HOURS PRN
Status: DISCONTINUED | OUTPATIENT
Start: 2023-05-23 | End: 2023-05-24

## 2023-05-23 RX ORDER — ASPIRIN 81 MG/1
81 TABLET ORAL DAILY
Status: DISCONTINUED | OUTPATIENT
Start: 2023-05-23 | End: 2023-05-24

## 2023-05-23 RX ORDER — HYDROCODONE BITARTRATE AND ACETAMINOPHEN 5; 325 MG/1; MG/1
2 TABLET ORAL EVERY 4 HOURS PRN
Status: DISCONTINUED | OUTPATIENT
Start: 2023-05-23 | End: 2023-05-24

## 2023-05-23 RX ORDER — LISINOPRIL 20 MG/1
20 TABLET ORAL DAILY
Status: DISCONTINUED | OUTPATIENT
Start: 2023-05-23 | End: 2023-05-24

## 2023-05-23 RX ORDER — SODIUM CHLORIDE 9 MG/ML
INJECTION, SOLUTION INTRAVENOUS CONTINUOUS
Status: DISCONTINUED | OUTPATIENT
Start: 2023-05-23 | End: 2023-05-24

## 2023-05-23 RX ORDER — ACETAMINOPHEN 500 MG
500 TABLET ORAL EVERY 4 HOURS PRN
Status: DISCONTINUED | OUTPATIENT
Start: 2023-05-23 | End: 2023-05-24

## 2023-05-23 RX ORDER — METOPROLOL TARTRATE 50 MG/1
50 TABLET, FILM COATED ORAL
Status: DISCONTINUED | OUTPATIENT
Start: 2023-05-23 | End: 2023-05-24

## 2023-05-23 RX ORDER — FAMOTIDINE 20 MG/1
20 TABLET, FILM COATED ORAL DAILY
Status: DISCONTINUED | OUTPATIENT
Start: 2023-05-23 | End: 2023-05-24

## 2023-05-23 RX ORDER — ONDANSETRON 2 MG/ML
4 INJECTION INTRAMUSCULAR; INTRAVENOUS EVERY 6 HOURS PRN
Status: DISCONTINUED | OUTPATIENT
Start: 2023-05-23 | End: 2023-05-24

## 2023-05-23 RX ORDER — METOCLOPRAMIDE HYDROCHLORIDE 5 MG/ML
5 INJECTION INTRAMUSCULAR; INTRAVENOUS EVERY 8 HOURS PRN
Status: DISCONTINUED | OUTPATIENT
Start: 2023-05-23 | End: 2023-05-24

## 2023-05-23 NOTE — PROGRESS NOTES
[facility] Pharmacy Note:  Renal Dose Adjustment for Metoclopramide (REGLAN)    Wilson Wilson has been prescribed Metoclopramide (REGLAN) 10 mg every 8 hours as needed for nausea/vomiting,. Estimated Creatinine Clearance: 29.6 mL/min (A) (based on SCr of 1.25 mg/dL (H)). Calculated creatinine clearance is < 40 ml/min, therefore, the dose of Metoclopramide (REGLAN) has been changed to 5 mg every 8 hours as needed for nausea/vomiting per P&T approved protocol. Pharmacy will continue to follow, and if renal function improves, will resume the original order.        Thank you,  Rosalia Hill, PharmD  5/23/2023 1:16 AM

## 2023-05-23 NOTE — ED QUICK NOTES
Patient remains on cardiac monitor, a-fib noted and patient has taken home medications. Patient denies chest pain, but states she can feel the heart palpations.

## 2023-05-23 NOTE — CM/SW NOTE
05/23/23 1500   CM/SW Referral Data   Referral Source Physician;Social Work (self-referral)   Reason for Referral Discharge planning;PHQ4/PHQ9   Medical Hx   Does patient have an established PCP? Yes  Jair Chang MD)   Patient Info   Advanced directives? No   Information provided? No   Patient's Current Mental Status at Time of Assessment Alert;Oriented   Patient's [address]   Patient lives with Alone   Patient Status Prior to Admission   Independent with ADLs and Mobility Yes   Discharge Needs   Anticipated D/C needs To be determined     SW received MDO for PHQ-4. SW initiated self referral for discharge planning. SW introduced self and role to pt. Pt is alert and oriented at time of assessment. Pt provided above information. Pt is from home alone, on current address on file. Pt reports that she is independent with ADLs. Pt reports that she drives and remains an active lifestyle. Pt reports that she has [facility] in the past, but no DANITZA. SW/CM explained screening for anxiety and depression during admittance to pt. Have you been diagnosed or treated with anxiety or depression? N  Are you feeling any anxiety or depression now? N  Have you or do you see a counselor/ psychiatrist?  N  Do you take any medications for anxiety or depression? N  Would you like additional resources for support for pts with anxiety/ depression? N    Pt reports that she get depressed when she feels lonely. Pt reports that she gets depressed because she wishes she can do things that she was able to do- such as [employer] and dinners for family and friends, cooking. Pt still enjoys gardening at this time. Pt expresses coping through remaining active in [employer] and with family and friends. CHARLOTTE/CHALINO to remain available for support and/or discharge planning.      Lindsay Mayfield, MSW, LSW   x [phone]

## 2023-05-23 NOTE — H&P
[facility]    PATIENT'S NAME: Zahra Bowers   ATTENDING PHYSICIAN: Netta Krishnan. Skye Chavarria MD   PATIENT ACCOUNT#:   [id number]    LOCATION:  [address]  MEDICAL RECORD #:   [id number]       YOB: 1940  ADMISSION DATE:       05/22/2023    HISTORY AND PHYSICAL EXAMINATION    CHIEF COMPLAINT:  Urinary tract infection, encephalopathy. HISTORY OF PRESENT ILLNESS:  Patient is an 27-year-old  female who was brought in today to the emergency department for evaluation of fever, chills and confusion for last 2 to 3 days. CBC showed white blood cell count of 10.5 with left shift. Urinalysis showed evidence of gross urinary tract infection. Lactic acid 1.0. Chemistry still pending. Blood and urine cultures were obtained. CT scan of the abdomen rule out kidney stones still pending. PAST MEDICAL HISTORY:  Remote history of kidney stones; hypertension; paroxysmal atrial fibrillation, anticoagulated with Xarelto. PAST SURGICAL HISTORY:  Remote appendectomy. MEDICATIONS:  Please see medication reconciliation list.    FAMILY HISTORY:  Positive for hypertension. SOCIAL HISTORY:  Lives with her family. No tobacco, alcohol, or drug use. Independent for basic activities of daily living. REVIEW OF SYSTEMS:  Per the family, patient has been exhibiting confusion and generalized weakness for last 2 to 3 days. Patient reported dysuria and urine frequency. Also reported low back pain, chills at home. No recent trauma or falls. Other 12-point review of systems is negative. PHYSICAL EXAMINATION:    GENERAL:  Alert and oriented, slightly confused but able to answer simple questions. VITAL SIGNS:  Temperature 101.5, pulse 110, respiratory rate 21, blood pressure 148/71, pulse ox 95% on room air. HEENT:  Atraumatic. Oropharynx clear. Dry mucous membranes. Normal hard and soft palate. Eyes:  Anicteric sclerae. NECK:  Supple. No lymphadenopathy. Trachea midline.   Full range of motion. LUNGS:  Clear to auscultation bilaterally. Normal respiratory effort. HEART:  Regular rate, rhythm. S1 and S2 auscultated. EKG showed normal sinus rhythm. ABDOMEN:  Soft, nondistended. No tenderness. Positive cerebrovascular accident tenderness on the right side. EXTREMITIES:  No peripheral edema, clubbing, or cyanosis. NEUROLOGIC:  Motor and sensory intact. ASSESSMENT AND PLAN:    1. Urinary tract infection, possible ascending pyelonephritis. 2.   Acute kidney injury. 3.   Hypertension. 4.   Possible early sepsis. Patient will be admitted to general medical floor. IV fluids, Rocephin. Blood and urine cultures. Follow up on CT scan of the abdomen, rule out kidney stones. Monitor her hemodynamic status. Further recommendations to follow.     Dictated By Mable Eugene MD  d: 05/22/2023 19:30:13  t: 05/22/2023 19:51:34  [facility] [id number]  RX/

## 2023-05-23 NOTE — CDS QUERY
How to answer this Query:     1.) DON'T CLICK COSIGN BUTTON FIRST  2.) Click \"3 dots. Yasmin Haneyws Yasmin Haneyws \" to the right of cosign button and click EDIT on the toolbar.  2.) Type an \"X\" in the bracket for the diagnosis that applies. (You may also add additional clinical details as you feel necessary to substantiate your response). 3.) Finally click \"Sign\" to complete response. Thank You    Clinical Significance - Sodium Value  Gustavus Spurling  Dear Dr. Romaine Khalil:  Clinical information (provided below) includes documentation of sodium value that is not within the normal range. For accurate ICD-10-CM code assignment to reflect severity of illness and risk of mortality,  PLEASE (X) ALL DIAGNOSES THAT APPLY. THIS SECTION FOR PROVIDER DOCUMENTATION ONLY  (x  ) Hyponatremia  (  ) Clinically Insignificant below normal sodium laboratory result  (  ) Other (please specify):   (  ) Unable to Determine (please comment)       Clinical Indicators:  Na level: 5/22/23 Na  130  5/22 H&P- Possible early sepsis  5/23 ED provider note: Complaints of weakness       Risk Factors:   5/22 H&P- FOX  5/22 H&P- UTI  Home Furosemide 20mg tab daily        Treatments:  Daily labs  IV Fluids                 If you have any questions, please contact Clinical : Yue Strickland RN CDS  at [address] [place], [place] [email or web address]    Thank You!   THIS FORM IS A PERMANENT PART OF THE MEDICAL

## 2023-05-24 VITALS
OXYGEN SATURATION: 97 % | WEIGHT: 118.63 LBS | HEART RATE: 82 BPM | BODY MASS INDEX: 24.9 KG/M2 | DIASTOLIC BLOOD PRESSURE: 83 MMHG | TEMPERATURE: 98 F | HEIGHT: 58 IN | RESPIRATION RATE: 16 BRPM | SYSTOLIC BLOOD PRESSURE: 138 MMHG

## 2023-05-24 LAB
ANION GAP SERPL CALC-SCNC: 6 MMOL/L (ref 0–18)
BASOPHILS # BLD AUTO: 0.03 X10(3) UL (ref 0–0.2)
BASOPHILS NFR BLD AUTO: 0.5 %
BUN BLD-MCNC: 13 MG/DL (ref 7–18)
BUN/CREAT SERPL: 15.1 (ref 10–20)
CALCIUM BLD-MCNC: 8.8 MG/DL (ref 8.5–10.1)
CHLORIDE SERPL-SCNC: 104 MMOL/L (ref 98–112)
CO2 SERPL-SCNC: 26 MMOL/L (ref 21–32)
CREAT BLD-MCNC: 0.86 MG/DL
DEPRECATED RDW RBC AUTO: 43.4 FL (ref 35.1–46.3)
EOSINOPHIL # BLD AUTO: 0.04 X10(3) UL (ref 0–0.7)
EOSINOPHIL NFR BLD AUTO: 0.6 %
ERYTHROCYTE [DISTWIDTH] IN BLOOD BY AUTOMATED COUNT: 14.3 % (ref 11–15)
GFR SERPLBLD BASED ON 1.73 SQ M-ARVRAT: 67 ML/MIN/1.73M2 (ref 60–?)
GLUCOSE BLD-MCNC: 101 MG/DL (ref 70–99)
HCT VFR BLD AUTO: 31.2 %
HGB BLD-MCNC: 10.7 G/DL
IMM GRANULOCYTES # BLD AUTO: 0.03 X10(3) UL (ref 0–1)
IMM GRANULOCYTES NFR BLD: 0.5 %
LYMPHOCYTES # BLD AUTO: 0.81 X10(3) UL (ref 1–4)
LYMPHOCYTES NFR BLD AUTO: 12.9 %
MCH RBC QN AUTO: 28.6 PG (ref 26–34)
MCHC RBC AUTO-ENTMCNC: 34.3 G/DL (ref 31–37)
MCV RBC AUTO: 83.4 FL
MONOCYTES # BLD AUTO: 0.64 X10(3) UL (ref 0.1–1)
MONOCYTES NFR BLD AUTO: 10.2 %
NEUTROPHILS # BLD AUTO: 4.71 X10 (3) UL (ref 1.5–7.7)
NEUTROPHILS # BLD AUTO: 4.71 X10(3) UL (ref 1.5–7.7)
NEUTROPHILS NFR BLD AUTO: 75.3 %
OSMOLALITY SERPL CALC.SUM OF ELEC: 282 MOSM/KG (ref 275–295)
PLATELET # BLD AUTO: 196 10(3)UL (ref 150–450)
POTASSIUM SERPL-SCNC: 3.4 MMOL/L (ref 3.5–5.1)
POTASSIUM SERPL-SCNC: 3.4 MMOL/L (ref 3.5–5.1)
RBC # BLD AUTO: 3.74 X10(6)UL
SODIUM SERPL-SCNC: 136 MMOL/L (ref 136–145)
WBC # BLD AUTO: 6.3 X10(3) UL (ref 4–11)

## 2023-05-24 PROCEDURE — 99239 HOSP IP/OBS DSCHRG MGMT >30: CPT | Performed by: HOSPITALIST

## 2023-05-24 RX ORDER — CEPHALEXIN 500 MG/1
500 CAPSULE ORAL 3 TIMES DAILY
Qty: 16 CAPSULE | Refills: 0 | Status: SHIPPED | OUTPATIENT
Start: 2023-05-24 | End: 2023-05-31

## 2023-05-24 RX ORDER — CEFAZOLIN SODIUM/WATER 2 G/20 ML
2 SYRINGE (ML) INTRAVENOUS EVERY 8 HOURS
Status: DISCONTINUED | OUTPATIENT
Start: 2023-05-24 | End: 2023-05-24

## 2023-05-24 RX ORDER — ACETAMINOPHEN 500 MG
500 TABLET ORAL EVERY 6 HOURS PRN
Qty: 1 TABLET | Refills: 0 | Status: SHIPPED | COMMUNITY
Start: 2023-05-24

## 2023-05-24 RX ORDER — METOCLOPRAMIDE HYDROCHLORIDE 5 MG/ML
10 INJECTION INTRAMUSCULAR; INTRAVENOUS EVERY 8 HOURS PRN
Status: DISCONTINUED | OUTPATIENT
Start: 2023-05-24 | End: 2023-05-24

## 2023-05-24 RX ORDER — POTASSIUM CHLORIDE 20 MEQ/1
40 TABLET, EXTENDED RELEASE ORAL ONCE
Status: COMPLETED | OUTPATIENT
Start: 2023-05-24 | End: 2023-05-24

## 2023-05-24 NOTE — PROGRESS NOTES
[facility] Pharmacy Note:  Renal Dose Adjustment for Metoclopramide (REGLAN)    Adrianna Valdez has been prescribed Metoclopramide (REGLAN) 5 mg every 8 hours as needed for nausea/vomiting,. Estimated Creatinine Clearance: 42.1 mL/min (based on SCr of 0.86 mg/dL). Calculated creatinine clearance is > 40 ml/min, therefore, the dose of Metoclopramide (REGLAN) has been changed to 10 mg every 8 hours as needed for nausea/vomiting per P&T approved protocol. Pharmacy will continue to follow, and if renal function improves, will resume the original order.        Thank you,  Faviola Hull, PharmD  5/24/2023 7:50 AM

## 2023-05-24 NOTE — DISCHARGE INSTRUCTIONS
Ok to go home  Fu primary     Medication List        START taking these medications      acetaminophen 500 MG Tabs  Commonly known as: Tylenol Extra Strength     cephalexin 500 MG Caps  Commonly known as: Keflex  Take 1 capsule (500 mg total) by mouth 3 (three) times daily. Start tonight stop if rash            CHANGE how you take these medications      rivaroxaban 15 MG Tabs  Commonly known as: Xarelto  Take 1 tablet (15 mg total) by mouth daily with food. What changed:   medication strength  how much to take  when to take this            CONTINUE taking these medications      aspirin 81 MG Tabs     famotidine 20 MG Tabs  Commonly known as: Pepcid  Take 1 tablet (20 mg total) by mouth 2 (two) times daily before meals. FISH OIL OR     gabapentin 300 MG Caps  Commonly known as: Neurontin  1 pill TID     lisinopril 10 MG Tabs  Commonly known as: Zestril  Take 2 tablets (20 mg total) by mouth daily. MELATONIN OR     metoprolol tartrate 50 MG Tabs  Commonly known as: Lopressor  Take 1 tablet (50 mg total) by mouth 2x Daily(Beta Blocker).      Multivitamin Adults 50+ Tabs     vitamin C 100 MG Tabs     Vitamin D3 25 MCG (1000 UT) Caps            STOP taking these medications      lisinopril-hydroCHLOROthiazide 20-25 MG Tabs  Commonly known as: Zestoretic               Where to Get Your Medications        These medications were sent to [facility], [address], [address] [place] [address]      Phone: [phone]   cephalexin 500 MG Caps  rivaroxaban 15 MG Tabs

## 2023-05-24 NOTE — DISCHARGE SUMMARY
Dc summary#[id number]  > 30 min spent on [facility] Discharge Diagnoses: uti with sepsis    Lace+ Score: 52  59-90 High Risk  29-58 Medium Risk  0-28   Low Risk. TCM Follow-Up Recommendation:  LACE < 29: Low Risk of readmission after discharge from the hospital. No TCM follow-up needed.

## 2023-05-24 NOTE — CM/SW NOTE
05/24/23 1400   Discharge disposition   Expected discharge disposition Home or Self   Discharge transportation Private car     Pt discussed in RN rounds. Pt received MDO for discharge. Pt is self, nn at this time. SW/CM to remain available for support and/or discharge planning.      Noé Gomez, MSW, LSW   x [phone]

## 2023-05-25 ENCOUNTER — TELEPHONE (OUTPATIENT)
Dept: INTERNAL MEDICINE CLINIC | Facility: CLINIC | Age: 83
End: 2023-05-25

## 2023-05-25 ENCOUNTER — PATIENT OUTREACH (OUTPATIENT)
Dept: CASE MANAGEMENT | Age: 83
End: 2023-05-25

## 2023-05-25 DIAGNOSIS — Z02.9 ENCOUNTERS FOR UNSPECIFIED ADMINISTRATIVE PURPOSE: Primary | ICD-10-CM

## 2023-05-25 LAB
BLACTX-M ISLT/SPM QL: NOT DETECTED
E COLI DNA BLD POS QL NAA+NON-PROBE: DETECTED

## 2023-05-25 PROCEDURE — 1111F DSCHRG MED/CURRENT MED MERGE: CPT

## 2023-05-25 NOTE — TELEPHONE ENCOUNTER
Daughter/Viry (on Hippa) states that she was returning a call. Daughter did schedule an appointment for the patient to see Dr. Jaz Orosco on 5-31-23 for a follow up after hospital discharge.

## 2023-05-25 NOTE — TELEPHONE ENCOUNTER
On Call  Received page from Gris in [address] [place] [address], positive blood cultures, pt needs to return to ER for treatment. Called patient, LVM. No details provided.

## 2023-05-25 NOTE — TELEPHONE ENCOUNTER
Attempted to contact pt for TCM today however no answer. Pt does not have HFU appt scheduled at this time. TCM/HFU appt recommended by 6/7/23 as pt is a moderate risk for readmission. Please advise. BOOK BY DATE (last date for TCM): 6/7/23    Clinical staff:  Please f/u with pt and try to get them to schedule as pt would greatly benefit from TCM/HFU appt. Thank you!

## 2023-05-25 NOTE — PROGRESS NOTES
LM for pt to call [facility] for TCM since discharge. [facility] phone number was provided for pt to call back. TE will be sent to PCP office to FU on HFU appt.

## 2023-05-25 NOTE — TELEPHONE ENCOUNTER
Pls call pt to schedule a [facility] hospital follow up (40min apt). .  TCM book date by is 6/7/23.

## 2023-05-25 NOTE — TELEPHONE ENCOUNTER
Received 2nd page from Gris in [employer] lab, she reports hospitalist called her and informed pt does not need to return to ER as she was placed on abx. Needs f/u with PCP.

## 2023-05-25 NOTE — PROGRESS NOTES
LM for pt to call [facility] for TCM since discharge. [facility] phone number was provided for pt to call back.

## 2023-05-26 NOTE — DISCHARGE SUMMARY
[facility]    PATIENT'S NAME: Elma Howell   ATTENDING PHYSICIAN: Manjinder Gray MD   PATIENT ACCOUNT#:   [id number]    LOCATION:  [address] [place] [address] #:   [id number]       YOB: 1940  ADMISSION DATE:       05/22/2023      DISCHARGE DATE:  05/24/2023    DISCHARGE SUMMARY    About 45 minutes were spent preparing this discharge. DISCHARGE DIAGNOSIS:  Acute metabolic encephalopathy caused by urinary tract infection with sepsis manifesting as a neurologic change. HISTORY AND HOSPITAL COURSE:  This is a very pleasant 72-year-old white female who appears much younger than her stated age who presents with a history of fevers and chills. She had evidence of urinary tract infection with a normal lactic acid and was not herself. She quickly improved with antibiotics and grew a pansensitive E coli and was changed to Ancef. I interviewed her and with her permission her daughter in the room, and the patient is back to her usual self. She was walking around, eating, doing well, had no balance issues, and we discharged her home. PHYSICAL EXAMINATION:    VITAL SIGNS:  On discharge temperature 98.1, pulse 82, respiratory rate 16, blood pressure 138/83, 97%. LUNGS:  Occasional rhonchi. HEART:  Normal S1, S2. No S3.  ABDOMEN:  Soft, nontender. EXTREMITIES:  Without calf tenderness. NEUROLOGIC:  She is alert, oriented, friendly, and cooperative. No apparent distress. LABORATORY STUDIES:  Please see chart. ASSESSMENT AND PLAN:    1. Acute metabolic encephalopathy related to  urinary tract infection with sepsis. Possible ascending pyelonephritis. CT negative. The patient is E coli sensitive. Continue Keflex. 2.   Acute renal failure. The patient's creatinine had normalized and was hydrated. It was probably related to dehydration. 3.   Hypertension. 4.   Paroxysmal atrial fibrillation. Xarelto dose was changed because of chronic kidney disease.   5.   DVT prophylaxis. Xarelto. CONDITION ON DISCHARGE:  Stable. CODE STATUS:  Not discussed during this hospitalization. DISCHARGE MEDICATIONS:    1.   Vitamin C 100 mg daily. 2.   Ecotrin 81 mg daily with food. 3.   Vitamin D 1000 units daily. 4.   Famotidine 20 mg twice a day. 5.   Gabapentin 300 mg 3 times a day. 6.   Lisinopril 20 mg daily. 7.   Melatonin 1 mg 5 days a week. 8.   Metoprolol 50 mg twice a day. 9.   Multivitamin once a day. 10.   Omega-3 fish oil 1 daily. 11.   Xarelto 15 mg daily. 12.   Tylenol 500 mg every 6 hours as needed. Watch total daily Tylenol limit to 3 g. 13.   Keflex 500 mg 3 times a day for 16 more doses. ACTIVITY:  As tolerated. DIET:  Low salt. FOLLOWUP:  Dr. Baron Villafana in a few days for followup advice. Return if mental status changes or other problems. RISK OF READMISSION:  Low. TCM followup not recommended. Dictated By Elle Conley.  MD Isaac  d: 05/24/2023 19:37:06  t: 05/25/2023 10:20:31  [facility] [id number]  [facility]/

## 2023-05-31 ENCOUNTER — OFFICE VISIT (OUTPATIENT)
Dept: INTERNAL MEDICINE CLINIC | Facility: CLINIC | Age: 83
End: 2023-05-31

## 2023-05-31 VITALS
DIASTOLIC BLOOD PRESSURE: 74 MMHG | HEIGHT: 58 IN | WEIGHT: 115.81 LBS | BODY MASS INDEX: 24.31 KG/M2 | HEART RATE: 67 BPM | SYSTOLIC BLOOD PRESSURE: 139 MMHG

## 2023-05-31 DIAGNOSIS — I10 ESSENTIAL HYPERTENSION: ICD-10-CM

## 2023-05-31 DIAGNOSIS — R51.9 CHRONIC FACIAL PAIN: ICD-10-CM

## 2023-05-31 DIAGNOSIS — G89.29 CHRONIC FACIAL PAIN: ICD-10-CM

## 2023-05-31 DIAGNOSIS — A41.9 SEPSIS DUE TO UNDETERMINED ORGANISM (HCC): Primary | ICD-10-CM

## 2023-05-31 DIAGNOSIS — N30.00 ACUTE CYSTITIS WITHOUT HEMATURIA: ICD-10-CM

## 2023-05-31 PROCEDURE — 1159F MED LIST DOCD IN RCRD: CPT | Performed by: INTERNAL MEDICINE

## 2023-05-31 PROCEDURE — 99496 TRANSJ CARE MGMT HIGH F2F 7D: CPT | Performed by: INTERNAL MEDICINE

## 2023-05-31 PROCEDURE — 1111F DSCHRG MED/CURRENT MED MERGE: CPT | Performed by: INTERNAL MEDICINE

## 2023-05-31 PROCEDURE — 1126F AMNT PAIN NOTED NONE PRSNT: CPT | Performed by: INTERNAL MEDICINE

## 2023-05-31 PROCEDURE — 3075F SYST BP GE 130 - 139MM HG: CPT | Performed by: INTERNAL MEDICINE

## 2023-05-31 PROCEDURE — 1160F RVW MEDS BY RX/DR IN RCRD: CPT | Performed by: INTERNAL MEDICINE

## 2023-05-31 PROCEDURE — 3078F DIAST BP <80 MM HG: CPT | Performed by: INTERNAL MEDICINE

## 2023-05-31 PROCEDURE — 3008F BODY MASS INDEX DOCD: CPT | Performed by: INTERNAL MEDICINE

## 2023-06-01 NOTE — PROGRESS NOTES
Multiple attempts to reach pt and messages left with no return call. Patient went in for HFU appt with PCP on 5/31/23. Encounter closing.

## 2023-06-23 ENCOUNTER — TELEPHONE (OUTPATIENT)
Dept: OPHTHALMOLOGY | Facility: CLINIC | Age: 83
End: 2023-06-23

## 2023-06-23 NOTE — TELEPHONE ENCOUNTER
Patient daughter call to cancelled appt for today 6/23/2023 she wanted to reschedule but next available appointment is until september, per daughter she will need to f/u earlier than september because patient has a patch. Please advise

## 2023-06-26 ENCOUNTER — OFFICE VISIT (OUTPATIENT)
Dept: INTERNAL MEDICINE CLINIC | Facility: CLINIC | Age: 83
End: 2023-06-26

## 2023-06-26 VITALS
HEIGHT: 58 IN | HEART RATE: 62 BPM | DIASTOLIC BLOOD PRESSURE: 70 MMHG | SYSTOLIC BLOOD PRESSURE: 136 MMHG | WEIGHT: 112 LBS | BODY MASS INDEX: 23.51 KG/M2

## 2023-06-26 DIAGNOSIS — Z23 NEED FOR PNEUMOCOCCAL VACCINE: ICD-10-CM

## 2023-06-26 DIAGNOSIS — I10 ESSENTIAL HYPERTENSION WITH GOAL BLOOD PRESSURE LESS THAN 130/80: Primary | ICD-10-CM

## 2023-06-26 DIAGNOSIS — I48.91 NEW ONSET A-FIB (HCC): ICD-10-CM

## 2023-06-26 DIAGNOSIS — G50.0 SUPRAORBITAL NEURALGIA: ICD-10-CM

## 2023-06-26 DIAGNOSIS — Z87.440 HX: UTI (URINARY TRACT INFECTION): ICD-10-CM

## 2023-06-26 PROCEDURE — 90677 PCV20 VACCINE IM: CPT | Performed by: INTERNAL MEDICINE

## 2023-06-26 PROCEDURE — 99215 OFFICE O/P EST HI 40 MIN: CPT | Performed by: INTERNAL MEDICINE

## 2023-06-26 PROCEDURE — 1159F MED LIST DOCD IN RCRD: CPT | Performed by: INTERNAL MEDICINE

## 2023-06-26 PROCEDURE — 3008F BODY MASS INDEX DOCD: CPT | Performed by: INTERNAL MEDICINE

## 2023-06-26 PROCEDURE — 1125F AMNT PAIN NOTED PAIN PRSNT: CPT | Performed by: INTERNAL MEDICINE

## 2023-06-26 PROCEDURE — 1160F RVW MEDS BY RX/DR IN RCRD: CPT | Performed by: INTERNAL MEDICINE

## 2023-06-26 PROCEDURE — 3078F DIAST BP <80 MM HG: CPT | Performed by: INTERNAL MEDICINE

## 2023-06-26 PROCEDURE — G0009 ADMIN PNEUMOCOCCAL VACCINE: HCPCS | Performed by: INTERNAL MEDICINE

## 2023-06-26 PROCEDURE — 3075F SYST BP GE 130 - 139MM HG: CPT | Performed by: INTERNAL MEDICINE

## 2023-07-12 ENCOUNTER — TELEPHONE (OUTPATIENT)
Dept: NEUROLOGY | Facility: CLINIC | Age: 83
End: 2023-07-12

## 2023-07-12 DIAGNOSIS — R51.9 CHRONIC FACIAL PAIN: ICD-10-CM

## 2023-07-12 DIAGNOSIS — G50.0 SUPRAORBITAL NEURALGIA: Primary | ICD-10-CM

## 2023-07-12 DIAGNOSIS — G89.29 CHRONIC FACIAL PAIN: ICD-10-CM

## 2023-07-12 DIAGNOSIS — H49.12 LEFT SUPERIOR OBLIQUE PALSY: ICD-10-CM

## 2023-07-12 DIAGNOSIS — G25.81 RLS (RESTLESS LEGS SYNDROME): ICD-10-CM

## 2023-07-12 NOTE — TELEPHONE ENCOUNTER
Patient has an apt with  [facility] for 08/01/2023 and due to HMO ins patient needs a referral .  Please creat a referral  to see  .   Thanks

## 2023-07-25 ENCOUNTER — TELEPHONE (OUTPATIENT)
Dept: OPHTHALMOLOGY | Facility: CLINIC | Age: 83
End: 2023-07-25

## 2023-07-25 NOTE — TELEPHONE ENCOUNTER
Per daughter the left eyelid is closing and the eye is wandering and asking if pt can be seen sooner than 8/11.  Please advise

## 2023-07-31 ENCOUNTER — LAB ENCOUNTER (OUTPATIENT)
Dept: LAB | Age: 83
End: 2023-07-31
Attending: INTERNAL MEDICINE
Payer: MEDICARE

## 2023-07-31 DIAGNOSIS — I10 ESSENTIAL HYPERTENSION: ICD-10-CM

## 2023-07-31 LAB
ALBUMIN SERPL-MCNC: 3.5 G/DL (ref 3.4–5)
ALBUMIN/GLOB SERPL: 1 {RATIO} (ref 1–2)
ALP LIVER SERPL-CCNC: 69 U/L
ALT SERPL-CCNC: 29 U/L
ANION GAP SERPL CALC-SCNC: 6 MMOL/L (ref 0–18)
AST SERPL-CCNC: 27 U/L (ref 15–37)
BASOPHILS # BLD AUTO: 0.06 X10(3) UL (ref 0–0.2)
BASOPHILS NFR BLD AUTO: 1.4 %
BILIRUB SERPL-MCNC: 0.9 MG/DL (ref 0.1–2)
BUN BLD-MCNC: 12 MG/DL (ref 7–18)
BUN/CREAT SERPL: 10.3 (ref 10–20)
CALCIUM BLD-MCNC: 9.4 MG/DL (ref 8.5–10.1)
CHLORIDE SERPL-SCNC: 107 MMOL/L (ref 98–112)
CO2 SERPL-SCNC: 29 MMOL/L (ref 21–32)
CREAT BLD-MCNC: 1.16 MG/DL
DEPRECATED RDW RBC AUTO: 45.4 FL (ref 35.1–46.3)
EGFRCR SERPLBLD CKD-EPI 2021: 47 ML/MIN/1.73M2 (ref 60–?)
EOSINOPHIL # BLD AUTO: 0.17 X10(3) UL (ref 0–0.7)
EOSINOPHIL NFR BLD AUTO: 4 %
ERYTHROCYTE [DISTWIDTH] IN BLOOD BY AUTOMATED COUNT: 14.1 % (ref 11–15)
FASTING STATUS PATIENT QL REPORTED: YES
GLOBULIN PLAS-MCNC: 3.6 G/DL (ref 2.8–4.4)
GLUCOSE BLD-MCNC: 96 MG/DL (ref 70–99)
HCT VFR BLD AUTO: 38.5 %
HGB BLD-MCNC: 12.6 G/DL
IMM GRANULOCYTES # BLD AUTO: 0.01 X10(3) UL (ref 0–1)
IMM GRANULOCYTES NFR BLD: 0.2 %
LYMPHOCYTES # BLD AUTO: 0.94 X10(3) UL (ref 1–4)
LYMPHOCYTES NFR BLD AUTO: 22.2 %
MCH RBC QN AUTO: 28.8 PG (ref 26–34)
MCHC RBC AUTO-ENTMCNC: 32.7 G/DL (ref 31–37)
MCV RBC AUTO: 87.9 FL
MONOCYTES # BLD AUTO: 0.42 X10(3) UL (ref 0.1–1)
MONOCYTES NFR BLD AUTO: 9.9 %
NEUTROPHILS # BLD AUTO: 2.63 X10 (3) UL (ref 1.5–7.7)
NEUTROPHILS # BLD AUTO: 2.63 X10(3) UL (ref 1.5–7.7)
NEUTROPHILS NFR BLD AUTO: 62.3 %
OSMOLALITY SERPL CALC.SUM OF ELEC: 294 MOSM/KG (ref 275–295)
PLATELET # BLD AUTO: 229 10(3)UL (ref 150–450)
POTASSIUM SERPL-SCNC: 3.6 MMOL/L (ref 3.5–5.1)
PROT SERPL-MCNC: 7.1 G/DL (ref 6.4–8.2)
RBC # BLD AUTO: 4.38 X10(6)UL
SODIUM SERPL-SCNC: 142 MMOL/L (ref 136–145)
WBC # BLD AUTO: 4.2 X10(3) UL (ref 4–11)

## 2023-07-31 PROCEDURE — 36415 COLL VENOUS BLD VENIPUNCTURE: CPT

## 2023-07-31 PROCEDURE — 80053 COMPREHEN METABOLIC PANEL: CPT

## 2023-07-31 PROCEDURE — 85025 COMPLETE CBC W/AUTO DIFF WBC: CPT

## 2023-07-31 NOTE — TELEPHONE ENCOUNTER
I spoke to Dr. Ginger Wilkins on 7/28/23, she said patient should keep her appointment on 8/11/23 as scheduled. I spoke to patient she understands and will keep appt 8/11/23.

## 2023-08-02 ENCOUNTER — OFFICE VISIT (OUTPATIENT)
Dept: NEUROLOGY | Facility: CLINIC | Age: 83
End: 2023-08-02
Payer: MEDICARE

## 2023-08-02 VITALS
DIASTOLIC BLOOD PRESSURE: 80 MMHG | HEIGHT: 56 IN | WEIGHT: 112 LBS | SYSTOLIC BLOOD PRESSURE: 139 MMHG | BODY MASS INDEX: 25.19 KG/M2 | HEART RATE: 64 BPM

## 2023-08-02 DIAGNOSIS — G25.81 RLS (RESTLESS LEGS SYNDROME): ICD-10-CM

## 2023-08-02 DIAGNOSIS — G50.0 SUPRAORBITAL NEURALGIA: Primary | ICD-10-CM

## 2023-08-02 DIAGNOSIS — F32.A DEPRESSION, UNSPECIFIED DEPRESSION TYPE: ICD-10-CM

## 2023-08-02 DIAGNOSIS — R20.2 PARESTHESIA: ICD-10-CM

## 2023-08-02 PROCEDURE — 1160F RVW MEDS BY RX/DR IN RCRD: CPT | Performed by: OTHER

## 2023-08-02 PROCEDURE — 1159F MED LIST DOCD IN RCRD: CPT | Performed by: OTHER

## 2023-08-02 PROCEDURE — 3079F DIAST BP 80-89 MM HG: CPT | Performed by: OTHER

## 2023-08-02 PROCEDURE — 99214 OFFICE O/P EST MOD 30 MIN: CPT | Performed by: OTHER

## 2023-08-02 PROCEDURE — 3075F SYST BP GE 130 - 139MM HG: CPT | Performed by: OTHER

## 2023-08-02 PROCEDURE — 3008F BODY MASS INDEX DOCD: CPT | Performed by: OTHER

## 2023-08-02 RX ORDER — DULOXETIN HYDROCHLORIDE 20 MG/1
20 CAPSULE, DELAYED RELEASE ORAL DAILY
Qty: 90 CAPSULE | Refills: 3 | Status: SHIPPED | OUTPATIENT
Start: 2023-08-02

## 2023-08-02 RX ORDER — GABAPENTIN 300 MG/1
CAPSULE ORAL
Qty: 360 CAPSULE | Refills: 3 | Status: SHIPPED | OUTPATIENT
Start: 2023-08-02

## 2023-08-02 NOTE — PROGRESS NOTES
Neurology follow up Visit     Referred By: Dr. Alfreda Cruz    Chief Complaint: Patient presents with:  Neurologic Problem: LOV 05/03/2023 patient present with RLS. Patient states she doing okay but still have pain in legs and head. Patient is taking gabapentin 300 MG Oral Cap. HPI:     Ayaz Garrett is a 80year old female, who presents for paresthesias, restless legs. 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 She eventually went to see doctors and was worked up including MRI of the brain, echocardiogram, Doppler of carotid arteries, MRI of the brain did show significant amount of white matter changes but no acute strokes. Patient still has some difficulty with managing her blood pressure primarily. In the meantime she also was found to have atrial fibrillation and therefore she was placed on Xarelto. She continued with feeling of paresthesias over the left side of the face. It is moderately bothersome to her. Patient also has history of restless leg syndrome, especially at night, difficulty falling asleep, ropinirole at 0.25 mg was already helpful. Left facial symptoms, coupled with diplopia, raised a strong suspicion for small vessel stroke that might have happened back in August 2021. Patient was appropriately treated with Xarelto, blood pressure medications, LDL was 92. Goal is below 70. However her blood pressure remained poorly controlled. She was addressing it with her primary care doctor or cardiologist.    To help with both her restless leg syndrome paresthesias we stopped ropinirole and instead start her on gabapentin slowly tapering of the dose went on a follow-up appointment in February 2022 she was on 100 milligrams in the morning, 100 mg at noon and 300 mg at night.   And while her restless leg symptoms were still well controlled, her pain was still quite bothersome especially at night. 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 Dose of gabapentin was increased. Patient came back for follow-up in August 2022. Doing slightly better. Mainly her symptoms of crawling electrical sensation in the left forehead was appearing when she was going to sleep. Patient came back for follow-up in May 2023. Continued with facial symmetry on left side, but also facial pain, both forehead, cheek and top of the head on the left side. It seems that the supraorbital blocks were not helpful and therefore it was repeated again. Occasional episodes of lightheadedness. Patient came back for follow-up in August 2023, facial pain was coming back, but she did not want to do injections again even though they were helpful in the past.  She was developing some kind of left eye deviation she is following with an ophthalmologist.  Her restless was getting worse at night.     Past Medical History:   Diagnosis Date    Blurry vision, left eye 09/30/2021    Decreased GFR 08/12/2019    Essential hypertension     Heart palpitations 09/30/2021    Hypertension     Lipid screening 06/25/2014    Malaise and fatigue 03/24/2017    Onychia of toe of left foot 2009    Left great toe/Depbridement of at least 2/3 toenail    Skin lesions 06/21/2021    Syncope and collapse 09/30/2021       Past Surgical History:   Procedure Laterality Date    APPENDECTOMY         Social history:  Smoking status:   Never      Smokeless tobacco:   Never       Alcohol use   No       Drug use:   No         Family History   Problem Relation Age of Onset    Glaucoma Neg     Macular degeneration Neg     Diabetes Neg          Current Outpatient Medications:     rivaroxaban 15 MG Oral Tab, Take 1 tablet (15 mg total) by mouth daily with food. , Disp: 30 tablet, Rfl: 0    acetaminophen 500 MG Oral Tab, Take 1 tablet (500 mg total) by mouth every 6 (six) hours as needed for Fever (equal to or greater than 100.4). , Disp: 1 tablet, Rfl: 0    famotidine 20 MG Oral Tab, Take 1 tablet (20 mg total) by mouth 2 (two) times daily before meals. , Disp: 180 tablet, Rfl: 3    lisinopril 10 MG Oral Tab, Take 2 tablets (20 mg total) by mouth daily. , Disp: 180 tablet, Rfl: 3    gabapentin 300 MG Oral Cap, 1 pill TID, Disp: 270 capsule, Rfl: 3    MELATONIN OR, Take 1 tablet by mouth 5 days out of the week. No weekends, Disp: , Rfl:     metoprolol tartrate 50 MG Oral Tab, Take 1 tablet (50 mg total) by mouth 2x Daily(Beta Blocker). , Disp: 60 tablet, Rfl: 5    Omega-3 Fatty Acids (FISH OIL OR), Take 1 teaspoon by mouth daily, Disp: , Rfl:     Ascorbic Acid (VITAMIN C) 100 MG Oral Tab, Take 1 tablet (100 mg total) by mouth daily. , Disp: , Rfl:     Multiple Vitamins-Minerals (MULTIVITAMIN ADULTS 50+) Oral Tab, Take 1 tablet by mouth daily. , Disp: , Rfl:     Cholecalciferol (VITAMIN D3) 25 MCG (1000 UT) Oral Cap, Take 1 tablet by mouth daily. , Disp: , Rfl:     aspirin 81 MG Oral Tab, Take 1 tablet (81 mg total) by mouth daily. , Disp: , Rfl:     No Known Allergies    ROS:   As in HPI, the rest of the 14 system review was done and was negative      Physical Exam:  There were no vitals filed for this visit. General: No apparent distress, well nourished, well groomed. Head- Normocephalic, atraumatic  Eyes- No redness or swelling  ENT- Hearing intake, normal glutition  Neck- No masses or adenopathy  Cv: pulses were palpable and normal, no cyanosis or edema     Neurological:     Mental Status- Alert and oriented x3.   Normal attention span and concentration  Thought process intact  Memory intact- recent and remote  Mood intact  Fund of knowledge appropriate for education and age    Language intact including: comprehension, naming, repetition, vocabulary    Cranial Nerves:  II.- Visual fields full to confrontation    III, IV, VI- EOM intact, TAY  V. Facial sensation decreased over V1 left-sided distribution  VII. Face symmetric, no facial weakness  VIII. Hearing intact to whisper. IX. Pallet elevates symmetrically. XI. Shoulder shrug is intact  XII. Tongue is midline    Motor Exam:  Muscle tone normal  No atrophy or fasciculations  Strength- upper extremities 5/5 proximally and distally                  - lower  extremities 5/5 proximally and distally    Sensory Exam:  Light touch sensation- intact in all 4 extremities    No clonus  No Babinski sign    Coordination:  Finger to nose intact  Rapid alternating movements intact    Gait:  Normal posture  Normal physiologic      Labs:    Lab Results   Component Value Date    TSH 2.160 04/21/2023     Lab Results   Component Value Date    HDL 59 04/21/2023    LDL 87 04/21/2023    TRIG 71 04/21/2023     Lab Results   Component Value Date    HGB 12.6 07/31/2023    HCT 38.5 07/31/2023    MCV 87.9 07/31/2023    WBC 4.2 07/31/2023    .0 07/31/2023      Lab Results   Component Value Date    BUN 12 07/31/2023    CA 9.4 07/31/2023    ALT 29 07/31/2023    AST 27 07/31/2023    ALKPHOS 63 06/25/2015    ALB 3.5 07/31/2023     07/31/2023    K 3.6 07/31/2023     07/31/2023    CO2 29.0 07/31/2023      I have reviewed labs. Imaging Studies:  I have independently reviewed imaging. MRI of the brain as above        Assessment   1. RLS (restless legs syndrome)  Patient with restless leg syndrome. Patient will remain on gabapentin since it was helpful with restless leg syndrome symptoms will increase the dose at night specifically. 2. Paresthesia  Left facial, coupled with diplopia, strong suspicion for small vessel stroke happened back in August 2021. Patient is appropriately treated with Xarelto, blood pressure medications, LDL was 87. Goal is below 70.     Possibility of supraorbital neuralgia was raised with this amount of pain at that location, therefore supraorbital nerve block was done and was already quite helpful for the pain around her eye. Patient did not want to repeat it again. 3. Diplopia  Myasthenia panel was negative           Education and counseling provided to patient. Instructed patient to call my office or seek medical attention immediately if symptoms worsen. Patient verbalized understanding of information given. All questions were answered. All side effects of drugs were discussed. Return to clinic in: No follow-ups on file.     Genevieve Jaimes MD

## 2023-08-03 ENCOUNTER — OFFICE VISIT (OUTPATIENT)
Dept: INTERNAL MEDICINE CLINIC | Facility: CLINIC | Age: 83
End: 2023-08-03

## 2023-08-03 VITALS
HEART RATE: 46 BPM | SYSTOLIC BLOOD PRESSURE: 139 MMHG | WEIGHT: 108.81 LBS | OXYGEN SATURATION: 99 % | BODY MASS INDEX: 24.48 KG/M2 | DIASTOLIC BLOOD PRESSURE: 66 MMHG | HEIGHT: 56 IN

## 2023-08-03 DIAGNOSIS — I48.20 ATRIAL FIBRILLATION, CHRONIC (HCC): ICD-10-CM

## 2023-08-03 DIAGNOSIS — I10 ESSENTIAL HYPERTENSION WITH GOAL BLOOD PRESSURE LESS THAN 130/80: Primary | ICD-10-CM

## 2023-08-03 DIAGNOSIS — F41.8 DEPRESSION WITH ANXIETY: ICD-10-CM

## 2023-08-03 DIAGNOSIS — R00.1 BRADYCARDIA: ICD-10-CM

## 2023-08-03 DIAGNOSIS — G50.0 SUPRAORBITAL NEURALGIA: ICD-10-CM

## 2023-08-03 DIAGNOSIS — I48.91 NEW ONSET A-FIB (HCC): ICD-10-CM

## 2023-08-03 PROCEDURE — 99214 OFFICE O/P EST MOD 30 MIN: CPT | Performed by: INTERNAL MEDICINE

## 2023-08-03 PROCEDURE — 1125F AMNT PAIN NOTED PAIN PRSNT: CPT | Performed by: INTERNAL MEDICINE

## 2023-08-03 PROCEDURE — 3075F SYST BP GE 130 - 139MM HG: CPT | Performed by: INTERNAL MEDICINE

## 2023-08-03 PROCEDURE — 3078F DIAST BP <80 MM HG: CPT | Performed by: INTERNAL MEDICINE

## 2023-08-03 PROCEDURE — 3008F BODY MASS INDEX DOCD: CPT | Performed by: INTERNAL MEDICINE

## 2023-08-03 PROCEDURE — 1159F MED LIST DOCD IN RCRD: CPT | Performed by: INTERNAL MEDICINE

## 2023-08-03 PROCEDURE — 1160F RVW MEDS BY RX/DR IN RCRD: CPT | Performed by: INTERNAL MEDICINE

## 2023-08-08 NOTE — TELEPHONE ENCOUNTER
Patient states that she has a new Mail Order Pharmacy through [facility] so she is requesting to get a new prescription for Metoprolol 50mg. Need 90 supply.

## 2023-08-09 RX ORDER — METOPROLOL TARTRATE 50 MG/1
50 TABLET, FILM COATED ORAL
Qty: 180 TABLET | Refills: 3 | OUTPATIENT
Start: 2023-08-09

## 2023-08-10 ENCOUNTER — OFFICE VISIT (OUTPATIENT)
Dept: INTERNAL MEDICINE CLINIC | Facility: CLINIC | Age: 83
End: 2023-08-10

## 2023-08-10 VITALS
HEIGHT: 56 IN | DIASTOLIC BLOOD PRESSURE: 88 MMHG | SYSTOLIC BLOOD PRESSURE: 168 MMHG | BODY MASS INDEX: 24.75 KG/M2 | HEART RATE: 56 BPM | WEIGHT: 110 LBS

## 2023-08-10 DIAGNOSIS — G50.0 SUPRAORBITAL NEURALGIA: ICD-10-CM

## 2023-08-10 DIAGNOSIS — H49.12 LEFT SUPERIOR OBLIQUE PALSY: ICD-10-CM

## 2023-08-10 DIAGNOSIS — I10 ESSENTIAL HYPERTENSION WITH GOAL BLOOD PRESSURE LESS THAN 130/80: Primary | ICD-10-CM

## 2023-08-10 DIAGNOSIS — R00.1 BRADYCARDIA: ICD-10-CM

## 2023-08-10 DIAGNOSIS — I48.91 NEW ONSET A-FIB (HCC): ICD-10-CM

## 2023-08-10 PROBLEM — N18.30 CKD (CHRONIC KIDNEY DISEASE) STAGE 3, GFR 30-59 ML/MIN (HCC): Chronic | Status: ACTIVE | Noted: 2023-08-10

## 2023-08-10 PROCEDURE — 99214 OFFICE O/P EST MOD 30 MIN: CPT | Performed by: INTERNAL MEDICINE

## 2023-08-10 PROCEDURE — 3079F DIAST BP 80-89 MM HG: CPT | Performed by: INTERNAL MEDICINE

## 2023-08-10 PROCEDURE — 3008F BODY MASS INDEX DOCD: CPT | Performed by: INTERNAL MEDICINE

## 2023-08-10 PROCEDURE — 1159F MED LIST DOCD IN RCRD: CPT | Performed by: INTERNAL MEDICINE

## 2023-08-10 PROCEDURE — 3077F SYST BP >= 140 MM HG: CPT | Performed by: INTERNAL MEDICINE

## 2023-08-10 PROCEDURE — 1126F AMNT PAIN NOTED NONE PRSNT: CPT | Performed by: INTERNAL MEDICINE

## 2023-08-10 PROCEDURE — 1160F RVW MEDS BY RX/DR IN RCRD: CPT | Performed by: INTERNAL MEDICINE

## 2023-08-10 RX ORDER — LOSARTAN POTASSIUM 100 MG/1
100 TABLET ORAL DAILY
Qty: 30 TABLET | Refills: 0 | Status: SHIPPED | OUTPATIENT
Start: 2023-08-10

## 2023-08-11 ENCOUNTER — OFFICE VISIT (OUTPATIENT)
Dept: OPHTHALMOLOGY | Facility: CLINIC | Age: 83
End: 2023-08-11

## 2023-08-11 DIAGNOSIS — H02.402 PTOSIS OF EYELID, LEFT: ICD-10-CM

## 2023-08-11 DIAGNOSIS — H52.03 HYPEROPIA OF BOTH EYES WITH ASTIGMATISM: ICD-10-CM

## 2023-08-11 DIAGNOSIS — H49.02 PUPIL SPARING THIRD NERVE PALSY OF LEFT EYE: Primary | ICD-10-CM

## 2023-08-11 DIAGNOSIS — H49.12 LEFT SUPERIOR OBLIQUE PALSY: ICD-10-CM

## 2023-08-11 DIAGNOSIS — H25.13 AGE-RELATED NUCLEAR CATARACT OF BOTH EYES: ICD-10-CM

## 2023-08-11 DIAGNOSIS — H52.203 HYPEROPIA OF BOTH EYES WITH ASTIGMATISM: ICD-10-CM

## 2023-08-11 PROCEDURE — 92060 SENSORIMOTOR EXAMINATION: CPT | Performed by: OPHTHALMOLOGY

## 2023-08-11 PROCEDURE — 92014 COMPRE OPH EXAM EST PT 1/>: CPT | Performed by: OPHTHALMOLOGY

## 2023-08-11 PROCEDURE — 1159F MED LIST DOCD IN RCRD: CPT | Performed by: OPHTHALMOLOGY

## 2023-08-11 NOTE — ASSESSMENT & PLAN NOTE
Old LSO from 8/2021 . Continue with same ground in prism glasses.    4.0 base up ground in right lens   3.0 base down ground in the left lens

## 2023-08-11 NOTE — ASSESSMENT & PLAN NOTE
New finding today, not present on last exam in April 2023. Daughter says patient developed Left ptosis about  May 2023. 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 Probably vascular with patient's history of Hypertension and atrial fib on Xarelto. No prism given because Left ptosis blocks diplopia. MRI brain and orbits was ordered. Follow up with Dr. Nadeen Vazquez. and Neurology again.

## 2023-08-11 NOTE — PATIENT INSTRUCTIONS
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 Continue with same ground in prism glasses. 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 Daughter says patient developed Left ptosis about  May 2023. 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 Probably vascular with patient's history of Hypertension and atrial fib on Xarelto. No prism given because Left ptosis blocks diplopia. MRI brain and orbits was ordered. Follow up with Dr. Jaz Orosco. and Neurology again. 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 Age-related nuclear cataract of both eyes  Mild, no treatment.

## 2023-08-13 NOTE — PROGRESS NOTES
Rachel Craig is a 80year old female. HPI:     HPI    EP/ 80 yr old here for a recheck prism. 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  Also cataracts, floaters, hyperopia with astigmatism and presbyopia. New glasses given with ground in prism. 4.0 base up ground in right lens   3.0 base down ground in the left lens     Patient was seen on 4/24/23 complaining of occasional double vision difficult to explain sometimes horizontal and vertical.  Dr. Keyur Lake put a 4 base out fresnel prism over her right lens. Patient denies any double vision since having the fresnel prism put on her right lens. She complains the her left eyelid has been drooping since May or June 2023 and feels it is getting worse. She also feels like her left eye is \"wandering\". Hx of stroke: 10/2/21 MRI showed microvascular ischemic changes. History of Hypertension and recent atrial fib.       10/17/22- Patient prefers 1 less prism when measured in the office- pt see's single with 6 or 7 prisms. She does not want to remake glasses at this time and will keep her prism glasses at total of 7. MRI Brain 1/4/23 done for dizziness and fall. Came to ER  CONCLUSION:   Limited 3-sequence stroke protocol study was performed. Within these parameters:   1. No acute intracranial process by noncontrast MR technique. 2. Senescent changes of parenchymal volume loss with sequela of chronic microvascular ischemic disease. 3. Lesser incidental findings as above.     Last edited by Alisha Tran MD on 8/13/2023  3:15 PM.        Patient History:  Past Medical History:   Diagnosis Date    Blurry vision, left eye 09/30/2021    Decreased GFR 08/12/2019    Essential hypertension     Heart palpitations 09/30/2021    Hypertension     Lipid screening 06/25/2014    Malaise and fatigue 03/24/2017    Onychia of toe of left foot 2009    Left great toe/Depbridement of at least 2/3 toenail    Skin lesions 06/21/2021    Syncope and collapse 09/30/2021       Surgical History: Cody Guo has a past surgical history that includes appendectomy. Family History   Problem Relation Age of Onset    Glaucoma Neg     Macular degeneration Neg     Diabetes Neg        Social History:   Social History     Socioeconomic History    Marital status:    Tobacco Use    Smoking status: Never     Passive exposure: Never    Smokeless tobacco: Never   Vaping Use    Vaping Use: Never used   Substance and Sexual Activity    Alcohol use: No     Alcohol/week: 0.0 standard drinks of alcohol    Drug use: No   Other Topics Concern    Caffeine Concern Yes     Comment: 1 cup coffee daily    Pt has a pacemaker No    Pt has a defibrillator No    Reaction to local anesthetic No       Medications:  Current Outpatient Medications   Medication Sig Dispense Refill    losartan 100 MG Oral Tab Take 1 tablet (100 mg total) by mouth daily. Instead of lisinopril 30 tablet 0    gabapentin 300 MG Oral Cap 1 pill in am and noon and 2 pills at night 360 capsule 3    DULoxetine 20 MG Oral Cap DR Particles Take 1 capsule (20 mg total) by mouth daily. 90 capsule 3    rivaroxaban 15 MG Oral Tab Take 1 tablet (15 mg total) by mouth daily with food. 30 tablet 0    acetaminophen 500 MG Oral Tab Take 1 tablet (500 mg total) by mouth every 6 (six) hours as needed for Fever (equal to or greater than 100.4). 1 tablet 0    famotidine 20 MG Oral Tab Take 1 tablet (20 mg total) by mouth 2 (two) times daily before meals. 180 tablet 3    MELATONIN OR Take 1 tablet by mouth 5 days out of the week. No weekends      metoprolol tartrate 50 MG Oral Tab Take 1 tablet (50 mg total) by mouth 2x Daily(Beta Blocker). 60 tablet 5    Omega-3 Fatty Acids (FISH OIL OR) Take 1 teaspoon by mouth daily      Ascorbic Acid (VITAMIN C) 100 MG Oral Tab Take 1 tablet (100 mg total) by mouth daily.       Multiple Vitamins-Minerals (MULTIVITAMIN ADULTS 50+) Oral Tab Take 1 tablet by mouth daily. Cholecalciferol (VITAMIN D3) 25 MCG (1000 UT) Oral Cap Take 1 tablet by mouth daily. aspirin 81 MG Oral Tab Take 1 tablet (81 mg total) by mouth daily. Allergies:  No Known Allergies    ROS:       PHYSICAL EXAM:     Base Eye Exam       Visual Acuity (Snellen - Linear)         Right Left    Dist cc 20/20 w/fresnel prism 20/25 +1    Near cc 20/20 20/30      Correction: Glasses              Tonometry (Applanation, 2:08 PM)         Right Left    Pressure 13 12              Pupils         Pupils Dark React APD    Right PERRL 3mm 2+ None    Left PERRL 3mm 2+ None              Dilation       Both eyes: 1.0% Mydriacyl and 2.5% [place] Synephrine @ 2:08 PM                  Strabismus Exam       Correction: sc      Distance Near Near +3DS N Bifocals     LXT' 35       RHypoT' 10          - - - - - -   - - - - - -                      LXT 50 - -  - -  LXT 35 - -  - -            LHT 10           - - - - - -   - - - - - -                Right eye  Full ductions  Left eye Limitation of Adduction. Elevation, and depression.  Full ABduction  Left Ptosis  Pupils normal 3/3/2+/2+ no RAPD    New Findings of Left Cranial Nerve 3 palsy, pupil sparing  History of Left Superior Oblique Palsy 2021       Slit Lamp and Fundus Exam       External Exam         Right Left    External Normal- Normal-              Slit Lamp Exam         Right Left    Lids/Lashes Blepharitis, normal lids Left ptosis    Conjunctiva/Sclera Temp pinguecula Temp pinguecula    Cornea Clear Clear    Anterior Chamber Deep and quiet Deep and quiet    Iris Normal Normal    Lens Nuclear sclerosis Nuclear sclerosis    Vitreous Clear Clear              Fundus Exam         Right Left    Disc Normal Normal    C/D Ratio 0.1 0.1                  Refraction       Wearing Rx         Sphere Cylinder Axis Add Vert Prism    Right +1.50 Sphere  +3.00 4.0 base up    Left +0.75 +0.50 150 +3.00 3.0 base down      Type: Progressive bifocal              Manifest Refraction         Sphere Cylinder [place] Dist [place] Add Near [place]    Right +1.50 Sphere  20/20 +3.00 20/20    Left +0.75 +0.50 150 20/25 +3.00 20/30                     ASSESSMENT/PLAN:     Diagnoses and Plan:     Hyperopia of both eyes with astigmatism  Same glasses with ground in prism for the Left Superior Oblique palsy from 8/21. 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 Continue with same ground in prism glasses. 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 Daughter says patient developed Left ptosis about  May 2023. 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 Probably vascular with patient's history of Hypertension and atrial fib on Xarelto. No prism given because Left ptosis blocks diplopia. MRI brain and orbits was ordered. Follow up with Dr. Louie Banuelos. and Neurology again. 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 Age-related nuclear cataract of both eyes  Mild, no treatment. No orders of the defined types were placed in this encounter. Meds This Visit:  Requested Prescriptions      No prescriptions requested or ordered in this encounter        Follow up instructions:  Return in about 1 month (around 9/11/2023), or if symptoms worsen or fail to improve, for Recheck of vision and motility. 8/13/2023  Scribed by: Hayden Canales.  Ebonie Randhawa MD

## 2023-08-29 ENCOUNTER — TELEPHONE (OUTPATIENT)
Dept: NEUROLOGY | Facility: CLINIC | Age: 83
End: 2023-08-29

## 2023-08-31 RX ORDER — LOSARTAN POTASSIUM 100 MG/1
100 TABLET ORAL DAILY
Qty: 90 TABLET | Refills: 0 | Status: SHIPPED | OUTPATIENT
Start: 2023-08-31

## 2023-09-01 RX ORDER — LOSARTAN POTASSIUM 100 MG/1
TABLET ORAL
Qty: 30 TABLET | Refills: 0 | OUTPATIENT
Start: 2023-09-01

## 2023-09-08 ENCOUNTER — HOSPITAL ENCOUNTER (OUTPATIENT)
Dept: MRI IMAGING | Facility: HOSPITAL | Age: 83
Discharge: HOME | End: 2023-09-08
Attending: OPHTHALMOLOGY
Payer: MEDICARE

## 2023-09-08 DIAGNOSIS — H49.02 PUPIL SPARING THIRD NERVE PALSY OF LEFT EYE: ICD-10-CM

## 2023-09-08 PROCEDURE — 70553 MRI BRAIN STEM W/O & W/DYE: CPT | Performed by: OPHTHALMOLOGY

## 2023-09-08 PROCEDURE — 70543 MRI ORBT/FAC/NCK W/O &W/DYE: CPT | Performed by: OPHTHALMOLOGY

## 2023-09-08 PROCEDURE — A9575 INJ GADOTERATE MEGLUMI 0.1ML: HCPCS

## 2023-09-08 RX ORDER — DIPHENHYDRAMINE HYDROCHLORIDE 50 MG/ML
10 INJECTION, SOLUTION INTRAMUSCULAR; INTRAVENOUS
Status: COMPLETED | OUTPATIENT
Start: 2023-09-08 | End: 2023-09-08

## 2023-09-08 RX ADMIN — DIPHENHYDRAMINE HYDROCHLORIDE 10 ML: 50 INJECTION, SOLUTION INTRAMUSCULAR; INTRAVENOUS at 14:29:00

## 2023-09-11 ENCOUNTER — OFFICE VISIT (OUTPATIENT)
Dept: OPHTHALMOLOGY | Facility: CLINIC | Age: 83
End: 2023-09-11

## 2023-09-11 DIAGNOSIS — H52.203 HYPEROPIA OF BOTH EYES WITH ASTIGMATISM: ICD-10-CM

## 2023-09-11 DIAGNOSIS — H49.02: Primary | ICD-10-CM

## 2023-09-11 DIAGNOSIS — H52.03 HYPEROPIA OF BOTH EYES WITH ASTIGMATISM: ICD-10-CM

## 2023-09-11 DIAGNOSIS — H49.12 LEFT SUPERIOR OBLIQUE PALSY: ICD-10-CM

## 2023-09-11 DIAGNOSIS — H02.402 PTOSIS OF EYELID, LEFT: ICD-10-CM

## 2023-09-11 DIAGNOSIS — H25.13 AGE-RELATED NUCLEAR CATARACT OF BOTH EYES: ICD-10-CM

## 2023-09-11 PROCEDURE — 92014 COMPRE OPH EXAM EST PT 1/>: CPT | Performed by: OPHTHALMOLOGY

## 2023-09-11 PROCEDURE — 1126F AMNT PAIN NOTED NONE PRSNT: CPT | Performed by: OPHTHALMOLOGY

## 2023-09-11 PROCEDURE — 92060 SENSORIMOTOR EXAMINATION: CPT | Performed by: OPHTHALMOLOGY

## 2023-09-11 PROCEDURE — 1159F MED LIST DOCD IN RCRD: CPT | Performed by: OPHTHALMOLOGY

## 2023-09-11 NOTE — PATIENT INSTRUCTIONS
CN III palsy, left eye  Left CN palsy with involvement of pupil today. MRI of brain and orbits done 9/8/23 was abnormal.  1.MRI of orbits 4.8 cm infiltrating process in the medial aspect of the Left orbit. Possible neoplastic or inflammatory lesion. Dr. Dale Pierre spoke to Dr. Yvon Preston at [facility]. He will see patient this week. Contact info given to Dr. Lyla Craft.  2. 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 Dr. Dale Pierre spoke to Dr. Jen Valdez. Patient to see her this week and Dr. Prema Louise. will refer to oncologist, Dr. 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 Continue with same ground in prism glasses. Age-related nuclear cataract of both eyes  Mild, no treatment. 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

## 2023-09-11 NOTE — PROGRESS NOTES
[facility] is a 80year old female. HPI:     HPI    EP/ 80year old F here for a RC . LDE: 8/11/23 with a history of hyperopia with astigmatism in both eyes, left superior oblique palsy- onset 8/21. New onset of pupil sparing third nerve plasy in the left eye and ptosis of the KORY was seen on 8/11/23 visit and MRI was ordered for that new finding. MRI brain and orbits was done on 9/8/23. Results reviewed today. Pt states she is experiencing vertical double vision majority of the time -having a hard time describing it but feels images are down and out. Pt feels double vision diminishes when closing either eye but feels some straining if looking with the left eye too long. Pt's KORY is still droopy and eye is out towards her ear. Pt had an MRI done on 9/8/23 and reads as follows:     CONCLUSION:    1. There is a 4.8 cm infiltrating process within the medial aspect of the left orbit inseparable from the left superior oblique muscle abutting the left superior rectus muscle and left inferior rectus muscle. This infiltrating process extends from the   medial preseptal extraconal fat of the left orbit posteriorly into the left orbital apex. There is intraconal fat involvement seen within the posterior aspect of the orbit. This infiltrating process is concerning for a neoplastic process such as   lymphoma or metastatic disease. An inflammatory process such as pseudotumor or an infectious process such as focal phlegmon is also possible but less likely given the possible extension outside of the orbit. Clinical correlation with the patient's   symptoms and ophthalmological exam is recommended. Tissue sampling may be necessary for definitive diagnosis. 2. 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   There is concerning thin/linear dural based enhancement at the medial aspect and floor of the left middle cranial fossa which   may represent tumor extension. 3. There is a background moderate chronic small vessel ischemic disease. 4. No acute infarct, acute intracranial hemorrhage, or hydrocephalus. The radiology support staff is in the process of contacting the referring physician regarding this report. Consult: per Dr. Jayashree Sanchez   Last edited by Corky Newsome MD on 9/11/2023  5:10 PM.        Patient History:  Past Medical History:   Diagnosis Date    Blurry vision, left eye 09/30/2021    Decreased GFR 08/12/2019    Essential hypertension     Heart palpitations 09/30/2021    Hypertension     Lipid screening 06/25/2014    Malaise and fatigue 03/24/2017    Onychia of toe of left foot 2009    Left great toe/Depbridement of at least 2/3 toenail    Skin lesions 06/21/2021    Syncope and collapse 09/30/2021       Surgical History: Nikita Nolasco has a past surgical history that includes appendectomy. Family History   Problem Relation Age of Onset    Glaucoma Neg     Macular degeneration Neg     Diabetes Neg        Social History:   Social History     Socioeconomic History    Marital status:    Tobacco Use    Smoking status: Never     Passive exposure: Never    Smokeless tobacco: Never   Vaping Use    Vaping Use: Never used   Substance and Sexual Activity    Alcohol use: No     Alcohol/week: 0.0 standard drinks of alcohol    Drug use: No   Other Topics Concern    Caffeine Concern Yes     Comment: 1 cup coffee daily    Pt has a pacemaker No    Pt has a defibrillator No    Reaction to local anesthetic No       Medications:  Current Outpatient Medications   Medication Sig Dispense Refill    losartan 100 MG Oral Tab Take 1 tablet (100 mg total) by mouth daily. 90 tablet 0    gabapentin 300 MG Oral Cap 1 pill in am and noon and 2 pills at night 360 capsule 3    DULoxetine 20 MG Oral Cap DR Particles Take 1 capsule (20 mg total) by mouth daily.  (Patient not taking: Reported on 9/11/2023) 90 capsule 3    rivaroxaban 15 MG Oral Tab Take 1 tablet (15 mg total) by mouth daily with food. 30 tablet 0    acetaminophen 500 MG Oral Tab Take 1 tablet (500 mg total) by mouth every 6 (six) hours as needed for Fever (equal to or greater than 100.4). 1 tablet 0    famotidine 20 MG Oral Tab Take 1 tablet (20 mg total) by mouth 2 (two) times daily before meals. 180 tablet 3    MELATONIN OR Take 1 tablet by mouth 5 days out of the week. No weekends      metoprolol tartrate 50 MG Oral Tab Take 1 tablet (50 mg total) by mouth 2x Daily(Beta Blocker). 60 tablet 5    Omega-3 Fatty Acids (FISH OIL OR) Take 1 teaspoon by mouth daily      Ascorbic Acid (VITAMIN C) 100 MG Oral Tab Take 1 tablet (100 mg total) by mouth daily. Multiple Vitamins-Minerals (MULTIVITAMIN ADULTS 50+) Oral Tab Take 1 tablet by mouth daily. Cholecalciferol (VITAMIN D3) 25 MCG (1000 UT) Oral Cap Take 1 tablet by mouth daily. aspirin 81 MG Oral Tab Take 1 tablet (81 mg total) by mouth daily.          Allergies:  No Known Allergies    ROS:     ROS    Positive for: Eyes  Negative for: Constitutional, Gastrointestinal, Neurological, Skin, Genitourinary, Musculoskeletal, Endocrine, Cardiovascular, Respiratory, Psychiatric, Allergic/Imm, Heme/Lymph  Last edited by Beatriz Loza OT on 9/11/2023 11:06 AM.          PHYSICAL EXAM:     Base Eye Exam       Visual Acuity (Snellen - Linear)         Right Left    Dist cc 20/25 -2 20/30    Dist ph cc  NI      Correction: Glasses   Pt moved head to the side when looking with the left eye             Tonometry (Icare, 11:43 AM)         Right Left    Pressure 18 11              Tonometry #2 (Applanation, 11:54 AM)         Right Left    Pressure 12 12              Pupils         Dark React APD    Right 3 Brisk     Left 5 Minimal +2              Dilation       Both eyes: 1.0% Mydriacyl @ 11:46 AM                  Strabismus Exam       Distance Near Near +3DS N Bifocals     LXT' 50            Limitation of elevation, depression and adduction of Left eye. Left ptosis  Pupil minimally reactive  Findings of a Left CN 3 Palsy involving the pupil  MRI brain and orbits reviewed. Dr. Jessica Nagel discussed with PCP Dr. Baron Villafana. Dr Nikki Florentino. will see patient also and will contact Dr. Kelsie Triplett, Oncology, to follow patient. [facility] surgeon Dr. Mini Mcallister at [facility]. Patient will be seen this week by Dr. Veronica Verdugo and probable orbital biopsy will be needed. Slit Lamp and Fundus Exam       External Exam         Right Left    External Normal- Normal-              Slit Lamp Exam         Right Left    Lids/Lashes Blepharitis, no ptosis Left ptosis    Conjunctiva/Sclera Temp pinguecula Temp pinguecula    Cornea Clear Clear    Anterior Chamber Deep and quiet Deep and quiet    Iris Normal Normal    Lens Nuclear sclerosis Nuclear sclerosis    Vitreous Clear Clear              Fundus Exam         Right Left    Disc Normal Normal    C/D Ratio 0.1 0.1    Macula Normal Normal    Vessels Normal Normal    Periphery Normal Normal                  Refraction       Wearing Rx         Sphere Cylinder Axis Add Vert Prism    Right +1.50 Sphere  +3.00 4.0 base up    Left +0.75 +0.50 150 +3.00 3.0 base down      Type: Progressive bifocal                     ASSESSMENT/PLAN:     Diagnoses and Plan:     CN III palsy, left eye  Left CN palsy with involvement of pupil today. MRI of brain and orbits done 9/8/23 was abnormal.  1.MRI of orbits 4.8 cm infiltrating process in the medial aspect of the Left orbit. Possible neoplastic or inflammatory lesion. Dr. Jessica Nagel spoke to Dr. Cole Santoyo at [facility]. He will see patient this week. Contact info given to Dr. Veronica Verdugo.  2. 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 Dr. Jessica Nagel spoke to Dr. Baron Villafana. Patient to see her this week and Dr. Nikki Florentino. will refer to oncologist, Dr. 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 Continue with same ground in prism glasses.          Age-related nuclear cataract of both eyes  Mild, no treatment. 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 No orders of the defined types were placed in this encounter. Meds This Visit:  Requested Prescriptions      No prescriptions requested or ordered in this encounter        Follow up instructions:  Return Dr. Eli Sheriff this week. Dr. Jack Johnson this week. See Neurologist also. .    9/11/2023  Scribed by: Alexsander Erazo.  Karmen Aaron MD

## 2023-09-11 NOTE — ASSESSMENT & PLAN NOTE
Left CN palsy with involvement of pupil today. MRI of brain and orbits done 9/8/23 was abnormal.  1.MRI of orbits 4.8 cm infiltrating process in the medial aspect of the Left orbit. Possible neoplastic or inflammatory lesion. Dr. Magaly Plata spoke to Dr. Oviedo at [facility]. He will see patient this week. Contact info given to Dr. Pao Flaherty.  2. 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 Dr. Magaly Plata spoke to Dr. Marybel Ann.  Patient to see her this week and Dr. Ellie Cuba. will refer to oncologist, Dr. Diego Bourgeois,

## 2023-09-12 ENCOUNTER — LAB ENCOUNTER (OUTPATIENT)
Dept: LAB | Age: 83
End: 2023-09-12
Attending: INTERNAL MEDICINE
Payer: MEDICARE

## 2023-09-12 ENCOUNTER — TELEPHONE (OUTPATIENT)
Dept: CASE MANAGEMENT | Age: 83
End: 2023-09-12

## 2023-09-12 ENCOUNTER — TELEPHONE (OUTPATIENT)
Dept: INTERNAL MEDICINE CLINIC | Facility: CLINIC | Age: 83
End: 2023-09-12

## 2023-09-12 DIAGNOSIS — H49.02 PUPIL SPARING THIRD NERVE PALSY OF LEFT EYE: Primary | ICD-10-CM

## 2023-09-12 DIAGNOSIS — H05.89 ORBITAL MASS: Primary | ICD-10-CM

## 2023-09-12 DIAGNOSIS — I10 ESSENTIAL HYPERTENSION WITH GOAL BLOOD PRESSURE LESS THAN 130/80: ICD-10-CM

## 2023-09-12 DIAGNOSIS — H05.89 ORBITAL MASS: ICD-10-CM

## 2023-09-12 LAB
ALBUMIN SERPL-MCNC: 3.5 G/DL (ref 3.4–5)
ALBUMIN/GLOB SERPL: 1 {RATIO} (ref 1–2)
ALP LIVER SERPL-CCNC: 71 U/L
ALT SERPL-CCNC: 39 U/L
ANION GAP SERPL CALC-SCNC: 7 MMOL/L (ref 0–18)
AST SERPL-CCNC: 32 U/L (ref 15–37)
BILIRUB SERPL-MCNC: 0.9 MG/DL (ref 0.1–2)
BUN BLD-MCNC: 19 MG/DL (ref 7–18)
BUN/CREAT SERPL: 15.4 (ref 10–20)
CALCIUM BLD-MCNC: 9.7 MG/DL (ref 8.5–10.1)
CHLORIDE SERPL-SCNC: 107 MMOL/L (ref 98–112)
CO2 SERPL-SCNC: 30 MMOL/L (ref 21–32)
CREAT BLD-MCNC: 1.23 MG/DL
EGFRCR SERPLBLD CKD-EPI 2021: 44 ML/MIN/1.73M2 (ref 60–?)
FASTING STATUS PATIENT QL REPORTED: NO
GLOBULIN PLAS-MCNC: 3.4 G/DL (ref 2.8–4.4)
GLUCOSE BLD-MCNC: 89 MG/DL (ref 70–99)
OSMOLALITY SERPL CALC.SUM OF ELEC: 300 MOSM/KG (ref 275–295)
POTASSIUM SERPL-SCNC: 3.3 MMOL/L (ref 3.5–5.1)
PROT SERPL-MCNC: 6.9 G/DL (ref 6.4–8.2)
SODIUM SERPL-SCNC: 144 MMOL/L (ref 136–145)

## 2023-09-12 PROCEDURE — 36415 COLL VENOUS BLD VENIPUNCTURE: CPT

## 2023-09-12 PROCEDURE — 80053 COMPREHEN METABOLIC PANEL: CPT

## 2023-09-12 NOTE — TELEPHONE ENCOUNTER
DR Avendano [facility] [phone] RE; ANA CRISTINA Brian [birth year] Paged at number :  PAGE: [phone] at : OPW- 09:09

## 2023-09-12 NOTE — TELEPHONE ENCOUNTER
Spoke to Dr. Ginger Wilkins yesterday ,regarding  patient had the new findings on MRI -of new the left orbital mass  ,Dr. Bruna Day is going to send the patient to see Dr. Sudhir Moyer  at [facility] , patient will aslo see me soon in office recommend patient to see me in few days   patient have schedule appointment soon 9/13/2023  Patient  will see - oncologist as well.

## 2023-09-13 ENCOUNTER — OFFICE VISIT (OUTPATIENT)
Dept: INTERNAL MEDICINE CLINIC | Facility: CLINIC | Age: 83
End: 2023-09-13

## 2023-09-13 VITALS
SYSTOLIC BLOOD PRESSURE: 148 MMHG | WEIGHT: 107 LBS | HEART RATE: 62 BPM | HEIGHT: 56 IN | DIASTOLIC BLOOD PRESSURE: 78 MMHG | BODY MASS INDEX: 24.07 KG/M2

## 2023-09-13 DIAGNOSIS — H05.89 ORBITAL MASS: ICD-10-CM

## 2023-09-13 DIAGNOSIS — I10 ESSENTIAL HYPERTENSION WITH GOAL BLOOD PRESSURE LESS THAN 130/80: Primary | ICD-10-CM

## 2023-09-13 PROCEDURE — 99214 OFFICE O/P EST MOD 30 MIN: CPT | Performed by: INTERNAL MEDICINE

## 2023-09-13 PROCEDURE — 3077F SYST BP >= 140 MM HG: CPT | Performed by: INTERNAL MEDICINE

## 2023-09-13 PROCEDURE — 3008F BODY MASS INDEX DOCD: CPT | Performed by: INTERNAL MEDICINE

## 2023-09-13 PROCEDURE — 1126F AMNT PAIN NOTED NONE PRSNT: CPT | Performed by: INTERNAL MEDICINE

## 2023-09-13 PROCEDURE — 1160F RVW MEDS BY RX/DR IN RCRD: CPT | Performed by: INTERNAL MEDICINE

## 2023-09-13 PROCEDURE — 1159F MED LIST DOCD IN RCRD: CPT | Performed by: INTERNAL MEDICINE

## 2023-09-13 PROCEDURE — 3078F DIAST BP <80 MM HG: CPT | Performed by: INTERNAL MEDICINE

## 2023-09-13 RX ORDER — POTASSIUM CHLORIDE 750 MG/1
10 TABLET, FILM COATED, EXTENDED RELEASE ORAL DAILY
Qty: 2 TABLET | Refills: 0 | Status: SHIPPED | OUTPATIENT
Start: 2023-09-13

## 2023-09-14 ENCOUNTER — TELEPHONE (OUTPATIENT)
Dept: NEUROLOGY | Facility: CLINIC | Age: 83
End: 2023-09-14

## 2023-09-22 ENCOUNTER — LAB ENCOUNTER (OUTPATIENT)
Dept: LAB | Age: 83
End: 2023-09-22
Attending: INTERNAL MEDICINE
Payer: MEDICARE

## 2023-09-22 DIAGNOSIS — E87.6 POTASSIUM SERUM DECREASED: ICD-10-CM

## 2023-09-22 LAB — POTASSIUM SERPL-SCNC: 3.6 MMOL/L (ref 3.5–5.1)

## 2023-09-22 PROCEDURE — 84132 ASSAY OF SERUM POTASSIUM: CPT

## 2023-09-22 PROCEDURE — 36415 COLL VENOUS BLD VENIPUNCTURE: CPT

## 2023-09-22 NOTE — TELEPHONE ENCOUNTER
In-basket message received from Tavia Peña RN to add patient to [facility] on 10/26/2023. Chart reviewed and prep started. Pending final surgical path from 10/17/2023. I will follow. Refill passed per [facility]3], [facility] protocol.     Hypertensive Medications  Protocol Criteria:  · Appointment scheduled in the past 6 months or in the next 3 months  · BMP or CMP in the past 12 months  · Creatinine result < 2  Recent Outpatient Visits

## 2023-09-26 ENCOUNTER — TELEPHONE (OUTPATIENT)
Dept: INTERNAL MEDICINE CLINIC | Facility: CLINIC | Age: 83
End: 2023-09-26

## 2023-09-26 NOTE — TELEPHONE ENCOUNTER
Jaqui Butler with [facility] is requesting a referral for outpatient CT scan    CT scan of sinus without IV contrast    Scheduled for CT scan 9-28    Fax [phone]

## 2023-09-28 ENCOUNTER — TELEPHONE (OUTPATIENT)
Dept: INTERNAL MEDICINE CLINIC | Facility: CLINIC | Age: 83
End: 2023-09-28

## 2023-09-28 NOTE — TELEPHONE ENCOUNTER
Dr. Amelia Aguiar is requesting medical clearance for patient scheduled for outpatient procedure     Dr. Anum Olivera  ENT  [address] [facility] [place]     Surgery   10/17/2023    FAX #[phone]    Cell # [phone]

## 2023-10-02 NOTE — TELEPHONE ENCOUNTER
Approved  appointment for the preoperative clearance recommend     patient to get also surgical - clearance from her cardiologist that she usually sees.

## 2023-10-02 NOTE — TELEPHONE ENCOUNTER
Scheduled for first available which is on 10/16. If that is too close to surgery date, we need approval to use a res 24 slot or to overbook.

## 2023-10-02 NOTE — TELEPHONE ENCOUNTER
Noted.  No further actions needed. Will fax clearance information once patient is approved for surgery.

## 2023-10-02 NOTE — TELEPHONE ENCOUNTER
LMTCB.  When pt calls pls schedule patient for pre op exam (40 min apt).   Can use TCM slot on Monday (10/9/23) 1pm or 4:20pm.

## 2023-10-03 NOTE — TELEPHONE ENCOUNTER
Left a detailed message to home # listed (ok per THOMAS) regarding note below.  Advised to call back as needed

## 2023-10-09 ENCOUNTER — OFFICE VISIT (OUTPATIENT)
Dept: INTERNAL MEDICINE CLINIC | Facility: CLINIC | Age: 83
End: 2023-10-09

## 2023-10-09 VITALS
HEIGHT: 56 IN | BODY MASS INDEX: 25.19 KG/M2 | WEIGHT: 112 LBS | HEART RATE: 65 BPM | DIASTOLIC BLOOD PRESSURE: 78 MMHG | SYSTOLIC BLOOD PRESSURE: 138 MMHG

## 2023-10-09 DIAGNOSIS — T14.8XXA SPLINTER IN SKIN: Primary | ICD-10-CM

## 2023-10-09 DIAGNOSIS — I48.91 NEW ONSET A-FIB (HCC): ICD-10-CM

## 2023-10-09 DIAGNOSIS — Z01.810 PREOP CARDIOVASCULAR EXAM: ICD-10-CM

## 2023-10-09 DIAGNOSIS — I10 ESSENTIAL HYPERTENSION WITH GOAL BLOOD PRESSURE LESS THAN 130/80: ICD-10-CM

## 2023-10-10 ENCOUNTER — OFFICE VISIT (OUTPATIENT)
Dept: SURGERY | Facility: CLINIC | Age: 83
End: 2023-10-10

## 2023-10-10 ENCOUNTER — HOSPITAL ENCOUNTER (OUTPATIENT)
Age: 83
Discharge: HOME | End: 2023-10-10
Payer: MEDICARE

## 2023-10-10 VITALS
TEMPERATURE: 98 F | RESPIRATION RATE: 20 BRPM | DIASTOLIC BLOOD PRESSURE: 80 MMHG | OXYGEN SATURATION: 100 % | HEART RATE: 62 BPM | SYSTOLIC BLOOD PRESSURE: 173 MMHG

## 2023-10-10 VITALS — BODY MASS INDEX: 25 KG/M2 | WEIGHT: 112 LBS

## 2023-10-10 DIAGNOSIS — S60.459A FOREIGN BODY FINGER: Primary | ICD-10-CM

## 2023-10-10 DIAGNOSIS — T14.8XXA SPLINTER: Primary | ICD-10-CM

## 2023-10-10 DIAGNOSIS — L03.011 CELLULITIS OF FINGER OF RIGHT HAND: ICD-10-CM

## 2023-10-10 PROCEDURE — 99213 OFFICE O/P EST LOW 20 MIN: CPT

## 2023-10-10 PROCEDURE — 10120 INC&RMVL FB SUBQ TISS SMPL: CPT

## 2023-10-10 RX ORDER — CEFADROXIL 500 MG/1
500 CAPSULE ORAL 2 TIMES DAILY
Qty: 14 CAPSULE | Refills: 0 | Status: SHIPPED | OUTPATIENT
Start: 2023-10-10 | End: 2023-10-17

## 2023-10-10 NOTE — DISCHARGE INSTRUCTIONS
There was a very tiny foreign body to the middle finger that was removed today in the immediate care. There is a surrounding skin infection called cellulitis. Please take the antibiotics as prescribed. If you do not see any improvement in your symptoms within the next 3 to 4 days you should see the hand specialist as discussed. If you have any worsening symptoms including worsening redness or swelling, fever, red streaking, chest pain or shortness of breath or any other concerning complaints you should go to the emergency department. You can also take Tylenol for pain. Please wash the area at least 2-3 times a day with plain soap and water. Do not soak the finger, no dishwashing. Use bacitracin/antibiotic ointment on the wound and keep it covered while you are out and about or working outside. If you are home and not exposed to any dirt or outside area you can keep the wound open to air for short time each day. Please make an appointment to see your primary care doctor next week for a wound check.

## 2023-10-13 PROBLEM — T14.8XXA SPLINTER: Status: ACTIVE | Noted: 2023-10-13

## 2023-10-16 NOTE — TELEPHONE ENCOUNTER
losartan 100 MG Oral Tab 90 tablet 0 8/31/2023     Sig - Route:  Take 1 tablet (100 mg total) by mouth daily. - Oral    Sent to pharmacy as: Losartan Potassium 100 MG Oral Tablet (Cozaar)    E-Prescribing Status: Receipt confirmed by pharmacy (8/31/2023  5:42 PM CDT)    [facility] [address] JOHN MACDONALD [address] [facility], [facility] [place], [phone]     Routing for protocol

## 2023-10-16 NOTE — TELEPHONE ENCOUNTER
Please review; Protocol Failed/ no protocol. Rx Pended, authorize if appropriate    Requested Prescriptions   Pending Prescriptions Disp Refills    losartan 100 MG Oral Tab 90 tablet 3     Sig: Take 1 tablet (100 mg total) by mouth daily.        Hypertensive Medications Protocol Failed - 10/16/2023  2:49 PM        Failed - Last BP reading less than 140/90     BP Readings from Last 1 Encounters:  10/10/23 : (!) 173/80              Failed - EGFRCR or GFRNAA > 50     GFR Evaluation  EGFRCR: 44 , resulted on 9/12/2023          Passed - In person appointment in the past 12 or next 3 months     Recent Outpatient Visits              6 days ago Ronn Fogo, Linna Halsted, MD    Office Visit    1 week ago Splinter in skin    Prosper Bridges MD    Office Visit    1 month ago Essential hypertension with goal blood pressure less than 130/80    Hernan Avina, Justin Shankar MD    Office Visit    1 month ago CN III palsy, left eye    Hernan Avina, Margaret Ohara MD    Office Visit    2 months ago 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    [address] [place],[address], [address] [facility], Margaret Ohara MD    Office Visit                      Passed - CMP or BMP in past 6 months     Recent Results (from the past 4392 hour(s))   Comp Metabolic Panel (14)    Collection Time: 09/12/23 11:17 AM   Result Value Ref Range    Glucose 89 70 - 99 mg/dL    Sodium 144 136 - 145 mmol/L    Potassium 3.3 (L) 3.5 - 5.1 mmol/L    Chloride 107 98 - 112 mmol/L    CO2 30.0 21.0 - 32.0 mmol/L    Anion Gap 7 0 - 18 mmol/L    BUN 19 (H) 7 - 18 mg/dL    Creatinine 1.23 (H) 0.55 - 1.02 mg/dL    BUN/CREA Ratio 15.4 10.0 - 20.0    Calcium, Total 9.7 8.5 - 10.1 mg/dL    Calculated Osmolality 300 (H) 275 - 295 mOsm/kg    eGFR-Cr 44 (L) >=60 mL/min/1.73m2 ALT 39 13 - 56 U/L    AST 32 15 - 37 U/L    Alkaline Phosphatase 71 55 - 142 U/L    Bilirubin, Total 0.9 0.1 - 2.0 mg/dL    Total Protein 6.9 6.4 - 8.2 g/dL    Albumin 3.5 3.4 - 5.0 g/dL    Globulin  3.4 2.8 - 4.4 g/dL    A/G Ratio 1.0 1.0 - 2.0    Patient Fasting for CMP? No      *Note: Due to a large number of results and/or encounters for the requested time period, some results have not been displayed. A complete set of results can be found in Results Review.                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

## 2023-10-16 NOTE — TELEPHONE ENCOUNTER
Current Outpatient Medications:       losartan 100 MG Oral Tab, Take 1 tablet (100 mg total) by mouth daily. , Disp: 90 tablet, Rfl: 0

## 2023-10-17 RX ORDER — LOSARTAN POTASSIUM 100 MG/1
100 TABLET ORAL DAILY
Qty: 90 TABLET | Refills: 3 | Status: SHIPPED | OUTPATIENT
Start: 2023-10-17

## 2023-10-25 ENCOUNTER — TELEPHONE (OUTPATIENT)
Dept: CASE MANAGEMENT | Age: 83
End: 2023-10-25

## 2023-10-25 NOTE — TELEPHONE ENCOUNTER
Dr. Nadeen Vazquez,     Please provide the specialist you recommend to referral [id number]    Thank you

## 2023-10-30 ENCOUNTER — OFFICE VISIT (OUTPATIENT)
Dept: OPHTHALMOLOGY | Facility: CLINIC | Age: 83
End: 2023-10-30

## 2023-10-30 DIAGNOSIS — H49.02: ICD-10-CM

## 2023-10-30 DIAGNOSIS — H02.055 TRICHIASIS OF LEFT LOWER EYELID: Primary | ICD-10-CM

## 2023-10-30 DIAGNOSIS — H52.03 HYPEROPIA OF BOTH EYES WITH ASTIGMATISM: ICD-10-CM

## 2023-10-30 DIAGNOSIS — H02.402 PTOSIS OF EYELID, LEFT: ICD-10-CM

## 2023-10-30 DIAGNOSIS — H01.02A SQUAMOUS BLEPHARITIS OF UPPER AND LOWER EYELIDS OF BOTH EYES: ICD-10-CM

## 2023-10-30 DIAGNOSIS — H52.203 HYPEROPIA OF BOTH EYES WITH ASTIGMATISM: ICD-10-CM

## 2023-10-30 DIAGNOSIS — H01.02B SQUAMOUS BLEPHARITIS OF UPPER AND LOWER EYELIDS OF BOTH EYES: ICD-10-CM

## 2023-10-30 PROCEDURE — 92014 COMPRE OPH EXAM EST PT 1/>: CPT | Performed by: OPHTHALMOLOGY

## 2023-10-30 PROCEDURE — 1126F AMNT PAIN NOTED NONE PRSNT: CPT | Performed by: OPHTHALMOLOGY

## 2023-10-30 PROCEDURE — 1159F MED LIST DOCD IN RCRD: CPT | Performed by: OPHTHALMOLOGY

## 2023-10-30 PROCEDURE — 67820 REVISE EYELASHES: CPT | Performed by: OPHTHALMOLOGY

## 2023-10-30 RX ORDER — ERYTHROMYCIN 5 MG/G
1 OINTMENT OPHTHALMIC NIGHTLY
Qty: 1 EACH | Refills: 1 | Status: SHIPPED | OUTPATIENT
Start: 2023-10-30 | End: 2023-11-09

## 2023-10-30 NOTE — PATIENT INSTRUCTIONS
Trichiasis of left lower eyelid  3 aberrant lashes removed. Pt feels better. No scratching. 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 Under the care of Dr. Cherry Angeles ENT at [facility]. Had an orbital biopsy 10/17/23. Awaiting path report. Squamous blepharitis of upper and lower eyelids of both eyes  Patient instructed to use lid hygiene twice daily. Apply baby shampoo or Ocusoft  to warm washcloth and cleanse eyelids gently with eyes closed, then rinse thoroughly. Use preservative free artificial tears several times a day. 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

## 2023-10-30 NOTE — ASSESSMENT & PLAN NOTE
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 Under the care of Dr. Forrest Kidney ENT at [facility]. Had an orbital biopsy 10/17/23. Awaiting path report.

## 2023-10-30 NOTE — ASSESSMENT & PLAN NOTE
Patient instructed to use lid hygiene twice daily. Apply baby shampoo or Ocusoft  to warm washcloth and cleanse eyelids gently with eyes closed, then rinse thoroughly. Use preservative free artificial tears several times a day.

## 2023-11-01 NOTE — PROGRESS NOTES
Dennis Calderon is a 80year old female. HPI:     HPI    EP/ 80year old F here for a pain and irritation evaluation in the left eye. 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 Pt's daughter states pt has been complaining of irritation, a FBS when she blinks and pain in the left eye that has been present after she had her Left orbital biopsy procedure with Dr. Estefani Marrufo on 10/17/23. Pt has tried using artifical tears and warm compresses but pt feels they aren't helping. Pt denies any double vision. Daughter states pt had a biopsy on 10/17/23 with Dr. Laura Rodriguez at [facility]- still waiting for pathology results. Pt's last MRI was on 9/8/23 and reads as follows:   CONCLUSION:    1. There is a 4.8 cm infiltrating process within the medial aspect of the left orbit inseparable from the left superior oblique muscle abutting the left superior rectus muscle and left inferior rectus muscle. This infiltrating process extends from the   medial preseptal extraconal fat of the left orbit posteriorly into the left orbital apex. There is intraconal fat involvement seen within the posterior aspect of the orbit. This infiltrating process is concerning for a neoplastic process such as   lymphoma or metastatic disease. An inflammatory process such as pseudotumor or an infectious process such as focal phlegmon is also possible but less likely given the possible extension outside of the orbit. Clinical correlation with the patient's   symptoms and ophthalmological exam is recommended. Tissue sampling may be necessary for definitive diagnosis. 2. 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   There is concerning thin/linear dural based enhancement at the medial aspect and floor of the left middle cranial fossa which   may represent tumor extension. 3. There is a background moderate chronic small vessel ischemic disease. 4. No acute infarct, acute intracranial hemorrhage, or hydrocephalus. Last edited by Keila Guerra MD on 11/1/2023 12:30 PM.        Patient History:  Past Medical History:   Diagnosis Date    Blurry vision, left eye 09/30/2021    Decreased GFR 08/12/2019    Essential hypertension     Heart palpitations 09/30/2021    Hypertension     Lipid screening 06/25/2014    Malaise and fatigue 03/24/2017    Onychia of toe of left foot 2009    Left great toe/Depbridement of at least 2/3 toenail    Skin lesions 06/21/2021    Syncope and collapse 09/30/2021       Surgical History: Violeta Light has a past surgical history that includes appendectomy. Family History   Problem Relation Age of Onset    Glaucoma Neg     Macular degeneration Neg     Diabetes Neg        Social History:   Social History     Socioeconomic History    Marital status:    Tobacco Use    Smoking status: Never     Passive exposure: Never    Smokeless tobacco: Never   Vaping Use    Vaping Use: Never used   Substance and Sexual Activity    Alcohol use: No     Alcohol/week: 0.0 standard drinks of alcohol    Drug use: No   Other Topics Concern    Caffeine Concern Yes     Comment: 1 cup coffee daily    Pt has a pacemaker No    Pt has a defibrillator No    Reaction to local anesthetic No       Medications:  Current Outpatient Medications   Medication Sig Dispense Refill    erythromycin 5 MG/GM Ophthalmic Ointment Place 1 Application into both eyes nightly for 10 days. Apply at bedtime to affected eye 1 each 1    metoprolol tartrate 50 MG Oral Tab Take 1 tablet 50 mg every morning and 1/2 tablet 25 mg in the evening      losartan 100 MG Oral Tab Take 1 tablet (100 mg total) by mouth daily.  90 tablet 3    gabapentin 300 MG Oral Cap 1 pill in am and noon and 2 pills at night 360 capsule 3    rivaroxaban 15 MG Oral Tab Take 1 tablet (15 mg total) by mouth daily with food. 30 tablet 0    acetaminophen 500 MG Oral Tab Take 1 tablet (500 mg total) by mouth every 6 (six) hours as needed for Fever (equal to or greater than 100.4). 1 tablet 0    famotidine 20 MG Oral Tab Take 1 tablet (20 mg total) by mouth 2 (two) times daily before meals. 180 tablet 3    MELATONIN OR Take 1 tablet by mouth 5 days out of the week. No weekends      Omega-3 Fatty Acids (FISH OIL OR) Take 1 teaspoon by mouth daily      Ascorbic Acid (VITAMIN C) 100 MG Oral Tab Take 1 tablet (100 mg total) by mouth daily. Multiple Vitamins-Minerals (MULTIVITAMIN ADULTS 50+) Oral Tab Take 1 tablet by mouth daily. Cholecalciferol (VITAMIN D3) 25 MCG (1000 UT) Oral Cap Take 1 tablet by mouth daily. aspirin 81 MG Oral Tab Take 1 tablet (81 mg total) by mouth daily.          Allergies:  No Known Allergies    ROS:     ROS    Positive for: Eyes  Negative for: Constitutional, Gastrointestinal, Neurological, Skin, Genitourinary, Musculoskeletal, HENT, Endocrine, Cardiovascular, Respiratory, Psychiatric, Allergic/Imm, Heme/Lymph  Last edited by Jean Baker OT on 10/30/2023  1:19 PM.          PHYSICAL EXAM:     Base Eye Exam       Visual Acuity (Snellen - Linear)         Right Left    Dist cc 20/20 -3 20/25 -3      Correction: Glasses              Pupils         Pupils APD    Right PERRL     Left PERRL +2                  Slit Lamp and Fundus Exam       External Exam         Right Left    External Normal- Normal-              Slit Lamp Exam         Right Left    Lids/Lashes Blepharitis, no ptosis Left ptosis blepharitis 3 aberrant lashes rubbing    Conjunctiva/Sclera Temp pinguecula Temp pinguecula    Cornea Clear Clear    Anterior Chamber Deep and quiet Deep and quiet    Iris Normal Normal    Lens Nuclear sclerosis Nuclear sclerosis    Vitreous Clear Clear              Fundus Exam         Right Left    Disc Normal Normal    C/D Ratio 0.1 0.1    Macula Normal Normal Refraction       Wearing Rx         Sphere Cylinder Axis Add Vert Prism    Right +1.50 Sphere  +3.00 4.0 base up    Left +0.75 +0.50 150 +3.00 3.0 base down      Type: Progressive bifocal                     ASSESSMENT/PLAN:     Diagnoses and Plan:     Trichiasis of left lower eyelid  3 aberrant lashes removed. Pt feels better. No scratching. 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 Under the care of Dr. Anuja Mckeon [facility]NT at [facility]. Had an orbital biopsy 10/17/23. Awaiting path report. Squamous blepharitis of upper and lower eyelids of both eyes  Patient instructed to use lid hygiene twice daily. Apply baby shampoo or Ocusoft  to warm washcloth and cleanse eyelids gently with eyes closed, then rinse thoroughly. Use preservative free artificial tears several times a day. 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 No orders of the defined types were placed in this encounter. Meds This Visit:  Requested Prescriptions     Signed Prescriptions Disp Refills    erythromycin 5 MG/GM Ophthalmic Ointment 1 each 1     Sig: Place 1 Application into both eyes nightly for 10 days. Apply at bedtime to affected eye        Follow up instructions:  Return in about 3 months (around 1/30/2024), or if symptoms worsen or fail to improve, for or Sriram Jameson.    11/1/2023  Scribed by: Yvonne Doll.  Magan Coleman MD

## 2023-11-07 ENCOUNTER — TELEPHONE (OUTPATIENT)
Dept: INTERNAL MEDICINE CLINIC | Facility: CLINIC | Age: 83
End: 2023-11-07

## 2023-11-07 NOTE — TELEPHONE ENCOUNTER
Baldo Ott I received a call from pt daughter Jaci Conway and she stated that she has received pt pathology report and it has words that say Malignant and carcinoma. Daughter will like to know if you can review this test and let her know what does it mean and what is the next step or do you want daughter to call the surgeons office for result. Per daughter she is aware that you can view this report. Daughter is very concern. Please advise   Daughter stated that pt had the test done at [facility]. Report can be viewed under care every where under \"other results. \" Please advise

## 2023-11-09 NOTE — TELEPHONE ENCOUNTER
Called daughter Kip Aguillon again regarding Sriram biopsy results. hope somebody from [address] [facility] office already was contacted patient and daughter regarding biopsy results with developing the plan of care  no answer ,left message we will try to reach again at the end of the day.

## 2023-11-09 NOTE — TELEPHONE ENCOUNTER
Spoke with pt's daughter per CARLOS GUZMAN verified, she stated she missed a call from our office. Pt's daughter was informed of MD recommendation. She stated Dr Carole Chavez office did call her back yesterday, pt will be seeing radiation oncologist (Dr Odalis Rey and Natalia Delgado) on 11-15-23.       JADA

## 2023-11-15 ENCOUNTER — APPOINTMENT (OUTPATIENT)
Dept: CT IMAGING | Facility: HOSPITAL | Age: 83
End: 2023-11-15
Attending: EMERGENCY MEDICINE
Payer: MEDICARE

## 2023-11-15 ENCOUNTER — HOSPITAL ENCOUNTER (INPATIENT)
Facility: HOSPITAL | Age: 83
LOS: 2 days | Discharge: HOME | End: 2023-11-17
Attending: EMERGENCY MEDICINE | Admitting: HOSPITALIST
Payer: MEDICARE

## 2023-11-15 ENCOUNTER — HOSPITAL ENCOUNTER (OUTPATIENT)
Age: 83
Discharge: OTHER INSTITUTION | End: 2023-11-15
Payer: MEDICARE

## 2023-11-15 VITALS
RESPIRATION RATE: 20 BRPM | TEMPERATURE: 102 F | OXYGEN SATURATION: 99 % | HEART RATE: 65 BPM | DIASTOLIC BLOOD PRESSURE: 58 MMHG | SYSTOLIC BLOOD PRESSURE: 148 MMHG

## 2023-11-15 DIAGNOSIS — D64.9 ANEMIA, UNSPECIFIED TYPE: ICD-10-CM

## 2023-11-15 DIAGNOSIS — R41.0 CONFUSION: Primary | ICD-10-CM

## 2023-11-15 DIAGNOSIS — R50.9 FEVER, UNSPECIFIED FEVER CAUSE: ICD-10-CM

## 2023-11-15 DIAGNOSIS — R41.82 ALTERED MENTAL STATUS, UNSPECIFIED ALTERED MENTAL STATUS TYPE: Primary | ICD-10-CM

## 2023-11-15 PROBLEM — N10 ACUTE PYELONEPHRITIS: Status: ACTIVE | Noted: 2023-11-15

## 2023-11-15 LAB
ALBUMIN SERPL-MCNC: 4 G/DL (ref 3.2–4.8)
ALP LIVER SERPL-CCNC: 94 U/L
ALT SERPL-CCNC: 43 U/L
ANION GAP SERPL CALC-SCNC: 8 MMOL/L (ref 0–18)
AST SERPL-CCNC: 66 U/L (ref ?–34)
BASOPHILS # BLD AUTO: 0.01 X10(3) UL (ref 0–0.2)
BASOPHILS NFR BLD AUTO: 0.4 %
BILIRUB DIRECT SERPL-MCNC: 0.2 MG/DL (ref ?–0.3)
BILIRUB SERPL-MCNC: 0.5 MG/DL (ref 0.2–1.1)
BILIRUB UR QL: NEGATIVE
BUN BLD-MCNC: 16 MG/DL (ref 9–23)
BUN/CREAT SERPL: 13.9 (ref 10–20)
CALCIUM BLD-MCNC: 9.2 MG/DL (ref 8.7–10.4)
CHLORIDE SERPL-SCNC: 94 MMOL/L (ref 98–112)
CLARITY UR: CLEAR
CO2 SERPL-SCNC: 27 MMOL/L (ref 21–32)
CREAT BLD-MCNC: 1.15 MG/DL
DEPRECATED RDW RBC AUTO: 43.1 FL (ref 35.1–46.3)
EGFRCR SERPLBLD CKD-EPI 2021: 47 ML/MIN/1.73M2 (ref 60–?)
EOSINOPHIL # BLD AUTO: 0 X10(3) UL (ref 0–0.7)
EOSINOPHIL NFR BLD AUTO: 0 %
ERYTHROCYTE [DISTWIDTH] IN BLOOD BY AUTOMATED COUNT: 13.7 % (ref 11–15)
GLUCOSE BLD-MCNC: 109 MG/DL (ref 70–99)
GLUCOSE UR-MCNC: NORMAL MG/DL
HCT VFR BLD AUTO: 33.1 %
HGB BLD-MCNC: 11.1 G/DL
HGB UR QL STRIP.AUTO: NEGATIVE
IMM GRANULOCYTES # BLD AUTO: 0.01 X10(3) UL (ref 0–1)
IMM GRANULOCYTES NFR BLD: 0.4 %
KETONES UR-MCNC: NEGATIVE MG/DL
LACTATE SERPL-SCNC: 0.9 MMOL/L (ref 0.5–2)
LEUKOCYTE ESTERASE UR QL STRIP.AUTO: NEGATIVE
LYMPHOCYTES # BLD AUTO: 0.46 X10(3) UL (ref 1–4)
LYMPHOCYTES NFR BLD AUTO: 17.8 %
MCH RBC QN AUTO: 28.6 PG (ref 26–34)
MCHC RBC AUTO-ENTMCNC: 33.5 G/DL (ref 31–37)
MCV RBC AUTO: 85.3 FL
MONOCYTES # BLD AUTO: 0.33 X10(3) UL (ref 0.1–1)
MONOCYTES NFR BLD AUTO: 12.8 %
NEUTROPHILS # BLD AUTO: 1.77 X10 (3) UL (ref 1.5–7.7)
NEUTROPHILS # BLD AUTO: 1.77 X10(3) UL (ref 1.5–7.7)
NEUTROPHILS NFR BLD AUTO: 68.6 %
NITRITE UR QL STRIP.AUTO: NEGATIVE
OSMOLALITY SERPL CALC.SUM OF ELEC: 270 MOSM/KG (ref 275–295)
PH UR: 5.5 [PH] (ref 5–8)
PLATELET # BLD AUTO: 184 10(3)UL (ref 150–450)
POTASSIUM SERPL-SCNC: 3.6 MMOL/L (ref 3.5–5.1)
PROT SERPL-MCNC: 6.6 G/DL (ref 5.7–8.2)
RBC # BLD AUTO: 3.88 X10(6)UL
SODIUM SERPL-SCNC: 129 MMOL/L (ref 136–145)
SP GR UR STRIP: 1.01 (ref 1–1.03)
UROBILINOGEN UR STRIP-ACNC: NORMAL
WBC # BLD AUTO: 2.6 X10(3) UL (ref 4–11)

## 2023-11-15 PROCEDURE — 99223 1ST HOSP IP/OBS HIGH 75: CPT | Performed by: HOSPITALIST

## 2023-11-15 PROCEDURE — 70450 CT HEAD/BRAIN W/O DYE: CPT | Performed by: EMERGENCY MEDICINE

## 2023-11-15 PROCEDURE — 74176 CT ABD & PELVIS W/O CONTRAST: CPT | Performed by: EMERGENCY MEDICINE

## 2023-11-15 RX ORDER — HEPARIN SODIUM 5000 [USP'U]/ML
5000 INJECTION, SOLUTION INTRAVENOUS; SUBCUTANEOUS EVERY 12 HOURS SCHEDULED
Status: DISCONTINUED | OUTPATIENT
Start: 2023-11-15 | End: 2023-11-16

## 2023-11-15 RX ORDER — ACETAMINOPHEN 500 MG
500 TABLET ORAL EVERY 4 HOURS PRN
Status: DISCONTINUED | OUTPATIENT
Start: 2023-11-15 | End: 2023-11-17

## 2023-11-15 RX ORDER — HYDROCODONE BITARTRATE AND ACETAMINOPHEN 5; 325 MG/1; MG/1
2 TABLET ORAL EVERY 4 HOURS PRN
Status: DISCONTINUED | OUTPATIENT
Start: 2023-11-15 | End: 2023-11-17

## 2023-11-15 RX ORDER — METOCLOPRAMIDE HYDROCHLORIDE 5 MG/ML
10 INJECTION INTRAMUSCULAR; INTRAVENOUS EVERY 8 HOURS PRN
Status: DISCONTINUED | OUTPATIENT
Start: 2023-11-15 | End: 2023-11-16

## 2023-11-15 RX ORDER — ACETAMINOPHEN 325 MG/1
650 TABLET ORAL EVERY 4 HOURS PRN
Status: DISCONTINUED | OUTPATIENT
Start: 2023-11-15 | End: 2023-11-17

## 2023-11-15 RX ORDER — SODIUM CHLORIDE 9 MG/ML
INJECTION, SOLUTION INTRAVENOUS CONTINUOUS
Status: DISCONTINUED | OUTPATIENT
Start: 2023-11-15 | End: 2023-11-17

## 2023-11-15 RX ORDER — HYDROCODONE BITARTRATE AND ACETAMINOPHEN 5; 325 MG/1; MG/1
1 TABLET ORAL EVERY 4 HOURS PRN
Status: DISCONTINUED | OUTPATIENT
Start: 2023-11-15 | End: 2023-11-17

## 2023-11-15 RX ORDER — ONDANSETRON 2 MG/ML
4 INJECTION INTRAMUSCULAR; INTRAVENOUS EVERY 6 HOURS PRN
Status: DISCONTINUED | OUTPATIENT
Start: 2023-11-15 | End: 2023-11-17

## 2023-11-15 NOTE — ED INITIAL ASSESSMENT (HPI)
X1 day weakness and fatigue, hematuria noted by daughter. Recent diagnosis of eye/sinus cancer, being evaluated at [facility].   Current temp 100.3F

## 2023-11-15 NOTE — ED INITIAL ASSESSMENT (HPI)
Patient arrived ambulatory to room with daughter. Symptoms started yesterday. +pink tinged urine. Daughter states patient has been \"out of sorts\" patient was acting appropriately yesterday  morning, symptoms developed throughout the day. Daughter states Varinder Zimmerman is not speaking normally. Not really making sense.  She found herself on the floor twice last night\"

## 2023-11-16 LAB
ANION GAP SERPL CALC-SCNC: 7 MMOL/L (ref 0–18)
ATRIAL RATE: 57 BPM
BASOPHILS # BLD AUTO: 0.01 X10(3) UL (ref 0–0.2)
BASOPHILS NFR BLD AUTO: 0.4 %
BUN BLD-MCNC: 12 MG/DL (ref 9–23)
BUN/CREAT SERPL: 12.1 (ref 10–20)
CALCIUM BLD-MCNC: 8.7 MG/DL (ref 8.7–10.4)
CHLORIDE SERPL-SCNC: 101 MMOL/L (ref 98–112)
CO2 SERPL-SCNC: 26 MMOL/L (ref 21–32)
CREAT BLD-MCNC: 0.99 MG/DL
DEPRECATED RDW RBC AUTO: 42.5 FL (ref 35.1–46.3)
EGFRCR SERPLBLD CKD-EPI 2021: 57 ML/MIN/1.73M2 (ref 60–?)
EOSINOPHIL # BLD AUTO: 0 X10(3) UL (ref 0–0.7)
EOSINOPHIL NFR BLD AUTO: 0 %
ERYTHROCYTE [DISTWIDTH] IN BLOOD BY AUTOMATED COUNT: 13.5 % (ref 11–15)
GLUCOSE BLD-MCNC: 88 MG/DL (ref 70–99)
HCT VFR BLD AUTO: 31.1 %
HGB BLD-MCNC: 10.3 G/DL
IMM GRANULOCYTES # BLD AUTO: 0.01 X10(3) UL (ref 0–1)
IMM GRANULOCYTES NFR BLD: 0.4 %
LYMPHOCYTES # BLD AUTO: 0.75 X10(3) UL (ref 1–4)
LYMPHOCYTES NFR BLD AUTO: 27.1 %
MCH RBC QN AUTO: 28.1 PG (ref 26–34)
MCHC RBC AUTO-ENTMCNC: 33.1 G/DL (ref 31–37)
MCV RBC AUTO: 84.7 FL
MONOCYTES # BLD AUTO: 0.49 X10(3) UL (ref 0.1–1)
MONOCYTES NFR BLD AUTO: 17.7 %
NEUTROPHILS # BLD AUTO: 1.51 X10 (3) UL (ref 1.5–7.7)
NEUTROPHILS # BLD AUTO: 1.51 X10(3) UL (ref 1.5–7.7)
NEUTROPHILS NFR BLD AUTO: 54.4 %
OSMOLALITY SERPL CALC.SUM OF ELEC: 277 MOSM/KG (ref 275–295)
P AXIS: 11 DEGREES
P-R INTERVAL: 162 MS
PLATELET # BLD AUTO: 177 10(3)UL (ref 150–450)
POTASSIUM SERPL-SCNC: 3 MMOL/L (ref 3.5–5.1)
POTASSIUM SERPL-SCNC: 4.2 MMOL/L (ref 3.5–5.1)
Q-T INTERVAL: 412 MS
QRS DURATION: 84 MS
QTC CALCULATION (BEZET): 401 MS
R AXIS: -10 DEGREES
RBC # BLD AUTO: 3.67 X10(6)UL
SODIUM SERPL-SCNC: 134 MMOL/L (ref 136–145)
T AXIS: 6 DEGREES
VENTRICULAR RATE: 57 BPM
WBC # BLD AUTO: 2.8 X10(3) UL (ref 4–11)

## 2023-11-16 PROCEDURE — 99233 SBSQ HOSP IP/OBS HIGH 50: CPT | Performed by: INTERNAL MEDICINE

## 2023-11-16 RX ORDER — ASPIRIN 81 MG/1
81 TABLET ORAL DAILY
Status: DISCONTINUED | OUTPATIENT
Start: 2023-11-16 | End: 2023-11-17

## 2023-11-16 RX ORDER — POTASSIUM CHLORIDE 20 MEQ/1
40 TABLET, EXTENDED RELEASE ORAL EVERY 4 HOURS
Status: COMPLETED | OUTPATIENT
Start: 2023-11-16 | End: 2023-11-16

## 2023-11-16 RX ORDER — MAGNESIUM OXIDE 400 MG (241.3 MG MAGNESIUM) TABLET
1 TABLET NIGHTLY PRN
Status: DISCONTINUED | OUTPATIENT
Start: 2023-11-16 | End: 2023-11-17

## 2023-11-16 RX ORDER — GABAPENTIN 300 MG/1
300 CAPSULE ORAL 3 TIMES DAILY
Status: DISCONTINUED | OUTPATIENT
Start: 2023-11-16 | End: 2023-11-17

## 2023-11-16 RX ORDER — METOCLOPRAMIDE HYDROCHLORIDE 5 MG/ML
5 INJECTION INTRAMUSCULAR; INTRAVENOUS EVERY 8 HOURS PRN
Status: DISCONTINUED | OUTPATIENT
Start: 2023-11-16 | End: 2023-11-17

## 2023-11-16 RX ORDER — LOSARTAN POTASSIUM 100 MG/1
100 TABLET ORAL DAILY
Status: DISCONTINUED | OUTPATIENT
Start: 2023-11-16 | End: 2023-11-17

## 2023-11-16 RX ORDER — METOPROLOL TARTRATE 50 MG/1
50 TABLET, FILM COATED ORAL EVERY MORNING
Status: DISCONTINUED | OUTPATIENT
Start: 2023-11-16 | End: 2023-11-17

## 2023-11-16 NOTE — CDS QUERY
Clinical Significance - Sodium Value  Jamila Norwood  Dear Dr. Ventura Rodrigez:  Clinical information (provided below) includes documentation of sodium value that is not within the normal range. PLEASE (X) ALL DIAGNOSES THAT APPLY. THIS SECTION FOR PROVIDER DOCUMENTATION ONLY  (X) Hyponatremia  (  ) Clinically Insignificant below normal sodium laboratory result  (  ) Other (please specify):       Clinical Indicators:  Na level:   11/15  Na 129  11/16  Na 134       Risk Factors:   Urinary tract infection, possible right pyelonephritis          Treatments:  Daily labs  IV Fluids                   If you have any questions, please contact Clinical : Myesha Zaragoza RN CDS  at [address] [place] [address] [email or web address]    Thank You!   THIS FORM IS A PERMANENT PART OF THE MEDICAL RECORD

## 2023-11-16 NOTE — ED QUICK NOTES
Orders for admission, patient is aware of plan and ready to go upstairs. Any questions, please call ED RN Bishop Washington at extension [phone].      Patient Covid vaccination status: Fully vaccinated     COVID Test Ordered in ED: None    COVID Suspicion at Admission: N/A    Running Infusions:  None    Mental Status/LOC at time of transport: A&Ox3    Other pertinent information:   CIWA score: N/A   NIH score:  N/A

## 2023-11-16 NOTE — CDS QUERY
Rod Figueroa    Dear Dr. Fredi Dow,   Clinical information (provided below) includes documentation of Malnutrition by the Clinical Dietician. PLEASE INDICATE IF YOU ARE IN AGREEMENT WITH THE FOLLOWING   ASSESSMENT BY THE CONSULTANT:  Moderate Malnutrition as documented by the Clinical Dietician on : 11/16/23  Shanice.Carrier ] Yes, I agree with this assessment      [  ] Other      Documentation from the Medical Record:    Dietician Nutritional Assessment: 11/16/2023: Pt meets moderate malnutrition criteria. CRITERIA FOR MALNUTRITION DIAGNOSIS: Criteria for non-severe malnutrition diagnosis: chronic illness related to energy intake less than75% for greater than 1 month and body fat mild depletion. Nutrition Diagnosis/Problem: Moderate Malnutrition related to Chronic - Physiological causes increasing nutrient needs due to illness or condition as evidenced by energy intake less than 75% for greater than 1 month, body fat mild depletion and non-significant unintentional weight loss over last year     Nutrition Focused Physical Exam (NFPE): mild depletion body fat triceps region  -      For questions regarding this query, please contact Clinical Documentation : [email or web address] [phone]  Thank You  THIS FORM IS A PERMANENT PART OF THE MEDICAL RECORD

## 2023-11-16 NOTE — H&P
[facility]    PATIENT'S NAME: Daina Rios   ATTENDING PHYSICIAN: Tahmina Keene MD   PATIENT ACCOUNT#:   [id number]    LOCATION:  [address]  MEDICAL RECORD #:   [id number]       YOB: 1940  ADMISSION DATE:       11/15/2023    HISTORY AND PHYSICAL EXAMINATION    CHIEF COMPLAINT:  Urinary tract infection and right pyelonephritis. HISTORY OF PRESENT ILLNESS:  The patient is an 80-year-old  female who came into the emergency department for evaluation of urine frequency, dysuria, and body aches, fever, and chills for last 2 to 3 days. CBC, chemistry, blood cultures, urinalysis with culture are still pending. CT scan of the abdomen rule out kidney stone was ordered, and CT scan of the brain was ordered for complaint of headache. Patient was started on IV fluids, IV Rocephin, and she will be admitted to the hospital for further management. PAST MEDICAL HISTORY:  She was hospitalized in May 2023 with urinary tract infection and bacteremia E coli at that time; history of hypertension; paroxysmal atrial fibrillation, anticoagulated with Xarelto; chronic kidney disease stage 3; generalized osteoarthritis; and osteoporosis. Recently, she was noted to have left eye lateral deviation, and she had an MRI and imaging study showing mass affecting the left lateral rectus optic muscle. 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 Patient to follow up with an oncologist over there. PAST SURGICAL HISTORY:  As mentioned above. Also she had an appendectomy. Patient reports that she had a remote history of kidney stones. MEDICATIONS:  Please see medication reconciliation list.      ALLERGIES:  No known drug allergies. FAMILY HISTORY:  Positive for hypertension. SOCIAL HISTORY:  No tobacco, alcohol, or drug use. Lives with her family. Independent for basic activities of daily living. REVIEW OF SYSTEMS:  Dysuria, urine frequency, fever, chills, body aches, and some back discomfort. Patient denies any nausea or vomiting. Other 12-point review of systems is negative. PHYSICAL EXAMINATION:    GENERAL:  Alert and oriented to time, place, and person. Fatigued, mild to moderate distress. VITAL SIGNS:  Temperature 101.5, pulse 61, respiratory rate 17, blood pressure 132/66, pulse ox 97% on room air. HEENT:  Atraumatic. Oropharynx clear. Left eye with lateral deviation gaze. NECK:  Supple. No lymphadenopathy. Trachea midline. Full range of motion. LUNGS:  Clear to auscultation bilaterally. Normal respiratory effort. HEART:  Regular rate, rhythm. S1 and S2 auscultated. No murmur. ABDOMEN:  Soft, nondistended. No tenderness. Positive bowel sounds. BACK:  Right costovertebral angle tenderness. EXTREMITIES:  No peripheral edema, clubbing, or cyanosis. NEUROLOGIC:  Motor and sensory intact. ASSESSMENT:    1. Urinary tract infection, possible right pyelonephritis. Rule out kidney stone. Rule out bacteremia. 2.   Hypertension. 3.   History of paroxysmal atrial fibrillation. 4.   Chronic kidney disease stage 3. PLAN:  Patient will be admitted to general medical floor. IV Rocephin. IV fluids. Blood and urine cultures. Follow up on imaging studies of the abdomen and brain. Fall precautions. Pain control. Further recommendations to follow.      Dictated By Zane Blevins MD  d: 11/15/2023 18:45:12  t: 11/15/2023 18:51:25  Job [id number]  /

## 2023-11-16 NOTE — PLAN OF CARE
Problem: Patient Centered Care  Goal: Patient preferences are identified and integrated in the patient's plan of care  Description: Interventions:  - What would you like us to know as we care for you?  From home alone  Problem: Patient/Family Goals  Goal: Patient/Family Long Term Goal  Description: Patient's Long Term Goal: Feel better    Interventions:  - Iv antibiotics   - See additional Care Plan goals for specific interventions  Outcome: Progressing  Goal: Patient/Family Short Term Goal  Description: Patient's Short Term Goal: No more confusion     Interventions:   - IV antibiotics for UTI   Problem: GASTROINTESTINAL - ADULT  Goal: Minimal or absence of nausea and vomiting  Description: INTERVENTIONS:  - Maintain adequate hydration with IV or PO as ordered and tolerated  - Nasogastric tube to low intermittent suction as ordered  - Evaluate effectiveness of ordered antiemetic medications  - Provide nonpharmacologic comfort measures as appropriate  - Advance diet as tolerated, if ordered  - Obtain nutritional consult as needed  - Evaluate fluid balance  Outcome: Progressing  Goal: Maintains or returns to baseline bowel function  Description: INTERVENTIONS:  - Assess bowel function  - Maintain adequate hydration with IV or PO as ordered and tolerated  - Evaluate effectiveness of GI medications  - Encourage mobilization and activity  - Obtain nutritional consult as needed  - Establish a toileting routine/schedule  - Consider collaborating with pharmacy to review patient's medication profile  Outcome: Progressing  Goal: Maintains adequate nutritional intake (undernourished)  Description: INTERVENTIONS:  - Monitor percentage of each meal consumed  - Identify factors contributing to decreased intake, treat as appropriate  - Assist with meals as needed  - Monitor I&O, WT and lab values  - Obtain nutritional consult as needed  - Optimize oral hygiene and moisture  - Encourage food from home; allow for food preferences  - Enhance eating environment  Outcome: Progressing  Goal: Achieves appropriate nutritional intake (bariatric)  Description: INTERVENTIONS:  - Monitor for over-consumption  - Identify factors contributing to increased intake, treat as appropriate  - Monitor I&O, WT and lab values  - Obtain nutritional consult as needed  - Evaluate psychosocial factors contributing to over-consumption  Outcome: Progressing     Problem: RESPIRATORY - ADULT  Goal: Achieves optimal ventilation and oxygenation  Description: INTERVENTIONS:  - Assess for changes in respiratory status  - Assess for changes in mentation and behavior  - Position to facilitate oxygenation and minimize respiratory effort  - Oxygen supplementation based on oxygen saturation or ABGs  - Provide Smoking Cessation handout, if applicable  - Encourage broncho-pulmonary hygiene including cough, deep breathe, Incentive Spirometry  - Assess the need for suctioning and perform as needed  - Assess and instruct to report SOB or any respiratory difficulty  - Respiratory Therapy support as indicated  - Manage/alleviate anxiety  - Monitor for signs/symptoms of CO2 retention  Outcome: Progressing     - See additional Care Plan goals for specific interventions  Outcome: Progressing     - Provide timely, complete, and accurate information to patient/family  - Incorporate patient and family knowledge, values, beliefs, and cultural backgrounds into the planning and delivery of care  - Encourage patient/family to participate in care and decision-making at the level they choose  - Honor patient and family perspectives and choices  Outcome: Progressing

## 2023-11-16 NOTE — CM/SW NOTE
11/16/23 1400   CM/SW Referral Data   Referral Source    Reason for Referral Discharge planning;PHQ4/PHQ9   Informant Patient   Medical Hx   Does patient have an established PCP? Yes  (Shashank Koenig)   Patient Info   Patient's Current Mental Status at Time of Assessment Oriented; Alert   Patient's [address]   Patient lives with Alone   Patient Status Prior to Admission   Independent with ADLs and Mobility Yes   Discharge Needs   Anticipated D/C needs No anticipated discharge needs     Pt discussed during nursing rounds. Dx UTI. Home alone, independent w/o device at baseline. PT/OT recommending home w/no services at dc. MDO received for PHQ4. CM met with patient at bedside who confirmed that she does get depressed and anxious at times, but denied need to speak with psych team ord any other resources during this admission. Pt states that she has a therapist to address any depression or anxious feelings to and that she is very spiritual and is grateful to rely on her sarthak in dire times of need. No home care needs identified on dc. Plan: Home pending medical clearance. / to remain available for support and/or discharge planning.    PRASANNA HoodN      [phone]

## 2023-11-16 NOTE — PROGRESS NOTES
[facility] Pharmacy Note:  Renal Dose Adjustment for Metoclopramide (REGLAN)    Harley Garcia has been prescribed Metoclopramide (REGLAN) 10 mg every 8 hours as needed for nausea/vomiting,. Estimated Creatinine Clearance: 33.4 mL/min (based on SCr of 0.99 mg/dL). Calculated creatinine clearance is < 40 ml/min, therefore, the dose of Metoclopramide (REGLAN) has been changed to 5 mg every 8 hours as needed for nausea/vomiting per P&T approved protocol. Pharmacy will continue to follow, and if renal function improves, will resume the original order.        Thank you,  Elizabeth Rivera, PharmD  11/16/2023 2:05 PM

## 2023-11-16 NOTE — PHYSICAL THERAPY NOTE
PHYSICAL THERAPY EVALUATION - INPATIENT     Room Number: 205/902-E  Evaluation Date: 11/16/2023  Type of Evaluation: Initial   Physician Order: PT Eval and Treat (functional mobility screen)    Presenting Problem: Confusion, urinary sx  Co-Morbidities : HTN, Afib on Xarelto, CKD3, OA, osteoporosis  Reason for Therapy: Mobility Dysfunction and Discharge Planning    PHYSICAL THERAPY ASSESSMENT     Patient is a 80year old female admitted 11/15/2023 for confusion, urinary sx. Patient received supine in bed, agreeable to therapy evaluation, pt handoff to/from OT for bathroom ADL assessment. Vital signs monitored as noted below, no adverse symptoms and patient stable during session. Patient demonstrates independence without an assistive device with functional mobility skills as noted below. Pt tolerating household and community distances with no assistive device, slow pace with decreased arm swing but no loss of balance, able to perform number of stairs required to navigate home. Pt has supportive family and neighbors, reports feeling back at baseline, no longer confused. Patient verbalizes no further mobility concerns at this time, is functioning safely with mobility to D/C at this level of care. Updated nurse on results of session, patient left seated in bedside chair with alarm engaged and daughter present, all lines intact, all needs met with call light in reach. Patient history and/or personal factors that may impact the plan of care include co-morbidities (HTN, Afib on Xarelto, CKD3, OA, osteoporosis) affecting activity tolerance, endurance, medical status . Based on the physical therapy examination of the noted systems and mobility skills, the patient presentation is stable given the patient demonstrates no significant barriers to meeting therapy goals. Physical Therapy to sign off at this time, please re-consult if patient experiences a decline in functional status.     The patient's Approx Degree of Impairment: 35.83% has been calculated based on documentation in the [facility] '6 clicks' Inpatient Basic Mobility Short Form. Research supports that patients with this level of impairment may benefit from home with no skilled therapy needs anticipated. DISCHARGE RECOMMENDATIONS  PT Discharge Recommendations: Home; Intermittent Supervision    PLAN    Patient has been evaluated and presents with no skilled Physical Therapy needs at this time. Patient will be discharged from Physical Therapy services. Please re-order if a new functional limitation presents during this admission. PHYSICAL THERAPY MEDICAL/SOCIAL HISTORY       Problem List  Principal Problem:    Confusion  Active Problems:    Acute pyelonephritis      HOME SITUATION  Home Situation  Type of Home: House  Home Layout: Two level; Able to live on main level  Stairs to Enter : 6  Railing: Yes  Lives With: Alone  Drives: Yes  Patient Owned Equipment: None  Patient Regularly Uses: Glasses     Prior Level of Gray: independent no assistive device, gardens, drives, goes out with family    SUBJECTIVE  \"I'm a cold chicken. \"    PHYSICAL THERAPY EXAMINATION     OBJECTIVE  Precautions: Bed/chair alarm  Fall Risk: Standard fall risk    WEIGHT BEARING RESTRICTION  None    PAIN ASSESSMENT  Ratin          COGNITION  Overall Cognitive Status:  WFL - within functional limits    RANGE OF MOTION AND STRENGTH ASSESSMENT  Upper extremity ROM and strength are within functional limits  BUEs  Lower extremity ROM is within functional limits  BLEs  Lower extremity strength is within functional limits  BLEs    BALANCE  Static Sitting: Normal  Dynamic Sitting: Normal  Static Standing: Normal  Dynamic Standing: Good      ACTIVITY TOLERANCE  Pulse: 63  Heart Rate Source: Monitor     BP: 149/75  BP Location: Right arm  BP Method: Automatic  Patient Position: Lying    O2 WALK  Oxygen Therapy  SPO2% on Room Air at Rest: 97    AM-PAC '6-Clicks' [place] [address] MOBILITY  How much difficulty does the patient currently have. .. Patient Difficulty: Turning over in bed (including adjusting bedclothes, sheets and blankets)?: None   Patient Difficulty: Sitting down on and standing up from a chair with arms (e.g., wheelchair, bedside commode, etc.): A Little   Patient Difficulty: Moving from lying on back to sitting on the side of the bed?: None   How much help from another person does the patient currently need. .. Help from Another: Moving to and from a bed to a chair (including a wheelchair)?: A Little   Help from Another: Need to walk in hospital room?: A Little   Help from Another: Climbing 3-5 steps with a railing?: A Little     AM-PAC Score:  Raw Score: 20   Approx Degree of Impairment: 35.83%   Standardized Score (AM-PAC Scale): 47.67   CMS Modifier (G-Code): CJ    FUNCTIONAL ABILITY STATUS  Functional Mobility/Gait Assessment  Gait Assistance: Contact guard assist;Supervision  Distance (ft): 15'+  Assistive Device: None  Pattern: Within Functional Limits      Exercise/Education Provided:  Bed mobility  Body mechanics  Energy conservation  Gait training  Posture  ROM  Strengthening  Transfer training    Patient End of Session: Up in chair;Needs met;Call light within reach;RN aware of session/findings; All patient questions and concerns addressed; Alarm set; Family present    Patient was able to achieve the following . ..    Patient able to transfer  At previous, functional level  Safely and independently    Patient able to ambulate on level surfaces  At previous, functional level  Safely and independently     Patient Evaluation Complexity Level:  History Moderate - 1 or 2 personal factors and/or co-morbidities   Examination of body systems Low - addressing 1-2 elements   Clinical Presentation Low - Stable   Clinical Decision Making Low Complexity       Gait Training: 15 minutes  Therapeutic Activity: 10 minutes      Jacek Null, PT, DPT  [facility]  Inpatient Rehabilitation  Physical Therapy  [phone]

## 2023-11-17 VITALS
WEIGHT: 108.31 LBS | OXYGEN SATURATION: 98 % | SYSTOLIC BLOOD PRESSURE: 149 MMHG | HEART RATE: 69 BPM | DIASTOLIC BLOOD PRESSURE: 82 MMHG | BODY MASS INDEX: 24 KG/M2 | RESPIRATION RATE: 18 BRPM | TEMPERATURE: 98 F

## 2023-11-17 LAB
ALBUMIN SERPL-MCNC: 3.3 G/DL (ref 3.2–4.8)
ALBUMIN/GLOB SERPL: 1.4 {RATIO} (ref 1–2)
ALP LIVER SERPL-CCNC: 71 U/L
ALT SERPL-CCNC: 27 U/L
ANION GAP SERPL CALC-SCNC: 6 MMOL/L (ref 0–18)
AST SERPL-CCNC: 49 U/L (ref ?–34)
BASOPHILS # BLD AUTO: 0.01 X10(3) UL (ref 0–0.2)
BASOPHILS NFR BLD AUTO: 0.4 %
BILIRUB SERPL-MCNC: 0.3 MG/DL (ref 0.2–1.1)
BUN BLD-MCNC: 10 MG/DL (ref 9–23)
BUN/CREAT SERPL: 12.2 (ref 10–20)
CALCIUM BLD-MCNC: 8.6 MG/DL (ref 8.7–10.4)
CHLORIDE SERPL-SCNC: 111 MMOL/L (ref 98–112)
CO2 SERPL-SCNC: 25 MMOL/L (ref 21–32)
CREAT BLD-MCNC: 0.82 MG/DL
DEPRECATED RDW RBC AUTO: 43.7 FL (ref 35.1–46.3)
EGFRCR SERPLBLD CKD-EPI 2021: 71 ML/MIN/1.73M2 (ref 60–?)
EOSINOPHIL # BLD AUTO: 0.01 X10(3) UL (ref 0–0.7)
EOSINOPHIL NFR BLD AUTO: 0.4 %
ERYTHROCYTE [DISTWIDTH] IN BLOOD BY AUTOMATED COUNT: 13.8 % (ref 11–15)
GLOBULIN PLAS-MCNC: 2.3 G/DL (ref 2.8–4.4)
GLUCOSE BLD-MCNC: 84 MG/DL (ref 70–99)
HCT VFR BLD AUTO: 32.4 %
HGB BLD-MCNC: 10.7 G/DL
IMM GRANULOCYTES # BLD AUTO: 0 X10(3) UL (ref 0–1)
IMM GRANULOCYTES NFR BLD: 0 %
LYMPHOCYTES # BLD AUTO: 0.83 X10(3) UL (ref 1–4)
LYMPHOCYTES NFR BLD AUTO: 33.6 %
MCH RBC QN AUTO: 28.6 PG (ref 26–34)
MCHC RBC AUTO-ENTMCNC: 33 G/DL (ref 31–37)
MCV RBC AUTO: 86.6 FL
MONOCYTES # BLD AUTO: 0.28 X10(3) UL (ref 0.1–1)
MONOCYTES NFR BLD AUTO: 11.3 %
NEUTROPHILS # BLD AUTO: 1.34 X10 (3) UL (ref 1.5–7.7)
NEUTROPHILS # BLD AUTO: 1.34 X10(3) UL (ref 1.5–7.7)
NEUTROPHILS NFR BLD AUTO: 54.3 %
OSMOLALITY SERPL CALC.SUM OF ELEC: 292 MOSM/KG (ref 275–295)
PLATELET # BLD AUTO: 158 10(3)UL (ref 150–450)
POTASSIUM SERPL-SCNC: 3.8 MMOL/L (ref 3.5–5.1)
PROT SERPL-MCNC: 5.6 G/DL (ref 5.7–8.2)
RBC # BLD AUTO: 3.74 X10(6)UL
SODIUM SERPL-SCNC: 142 MMOL/L (ref 136–145)
WBC # BLD AUTO: 2.5 X10(3) UL (ref 4–11)

## 2023-11-17 PROCEDURE — 99239 HOSP IP/OBS DSCHRG MGMT >30: CPT | Performed by: INTERNAL MEDICINE

## 2023-11-17 RX ORDER — NITROFURANTOIN 25; 75 MG/1; MG/1
100 CAPSULE ORAL 2 TIMES DAILY
Qty: 18 CAPSULE | Refills: 0 | Status: SHIPPED | OUTPATIENT
Start: 2023-11-17 | End: 2023-11-26

## 2023-11-20 ENCOUNTER — OFFICE VISIT (OUTPATIENT)
Dept: INTERNAL MEDICINE CLINIC | Facility: CLINIC | Age: 83
End: 2023-11-20

## 2023-11-20 ENCOUNTER — MED REC SCAN ONLY (OUTPATIENT)
Dept: INTERNAL MEDICINE CLINIC | Facility: CLINIC | Age: 83
End: 2023-11-20

## 2023-11-20 ENCOUNTER — PATIENT OUTREACH (OUTPATIENT)
Dept: CASE MANAGEMENT | Age: 83
End: 2023-11-20

## 2023-11-20 VITALS
DIASTOLIC BLOOD PRESSURE: 85 MMHG | WEIGHT: 107 LBS | BODY MASS INDEX: 24.07 KG/M2 | SYSTOLIC BLOOD PRESSURE: 132 MMHG | HEIGHT: 56 IN | OXYGEN SATURATION: 96 %

## 2023-11-20 DIAGNOSIS — N30.00 ACUTE CYSTITIS WITHOUT HEMATURIA: Primary | ICD-10-CM

## 2023-11-20 DIAGNOSIS — Z02.9 ENCOUNTERS FOR UNSPECIFIED ADMINISTRATIVE PURPOSE: Primary | ICD-10-CM

## 2023-11-20 DIAGNOSIS — I48.91 ATRIAL FIBRILLATION, UNSPECIFIED TYPE (HCC): ICD-10-CM

## 2023-11-20 DIAGNOSIS — Z23 NEEDS FLU SHOT: ICD-10-CM

## 2023-11-20 DIAGNOSIS — D64.9 ANEMIA, UNSPECIFIED TYPE: ICD-10-CM

## 2023-11-20 DIAGNOSIS — K13.0 SORE LIP: ICD-10-CM

## 2023-11-20 LAB
IRON SATN MFR SERPL: 34 %
IRON SERPL-MCNC: 79 UG/DL
TIBC SERPL-MCNC: 232 UG/DL (ref 250–425)
TRANSFERRIN SERPL-MCNC: 156 MG/DL (ref 250–380)

## 2023-11-20 PROCEDURE — 1111F DSCHRG MED/CURRENT MED MERGE: CPT

## 2023-11-20 RX ORDER — FERROUS SULFATE 324(65)MG
1 TABLET, DELAYED RELEASE (ENTERIC COATED) ORAL
Qty: 90 TABLET | Refills: 0 | Status: SHIPPED | OUTPATIENT
Start: 2023-11-20 | End: 2023-12-20

## 2023-11-20 NOTE — PROGRESS NOTES
TCM appt today at 1 p.m.     Follow up appointments:      Your appointments       Date & Time Appointment Department [facility])    Nov 20, 2023  1:00 PM [address] Follow Up with Paolo Burnett MD [facility], [address] [facility] ([address] [facility])              [address], [address] [place], [facility] [address] [address] [phone]

## 2023-11-27 ENCOUNTER — OFFICE VISIT (OUTPATIENT)
Dept: DERMATOLOGY CLINIC | Facility: CLINIC | Age: 83
End: 2023-11-27

## 2023-11-27 DIAGNOSIS — L82.1 SEBORRHEIC KERATOSIS: Primary | ICD-10-CM

## 2023-11-27 DIAGNOSIS — K13.1 LIP BITING: ICD-10-CM

## 2023-11-27 PROCEDURE — 1126F AMNT PAIN NOTED NONE PRSNT: CPT | Performed by: PHYSICIAN ASSISTANT

## 2023-11-27 PROCEDURE — 1111F DSCHRG MED/CURRENT MED MERGE: CPT | Performed by: PHYSICIAN ASSISTANT

## 2023-11-27 PROCEDURE — 1159F MED LIST DOCD IN RCRD: CPT | Performed by: PHYSICIAN ASSISTANT

## 2023-11-27 PROCEDURE — 99213 OFFICE O/P EST LOW 20 MIN: CPT | Performed by: PHYSICIAN ASSISTANT

## 2023-11-27 NOTE — PROGRESS NOTES
HPI:    Patient ID: Adria Fajardo is a 80year old female. Patient presents today with a cold sore on her lower lip. Patient states it doesn't hurt for the past 2 weeks. No draining or tenderness noted. No allergies to medications noted. Does have hx of stroke and has numbness on the left side. Has lesions on her back as well. No draining or tenderness noted. Review of Systems   Constitutional:  Negative for chills and fever. Musculoskeletal:  Negative for arthralgias and myalgias. Skin:  Positive for rash. Negative for color change and wound. Current Outpatient Medications   Medication Sig Dispense Refill    Ferrous Sulfate 324 (65 Fe) MG Oral Tab EC Take 1 tablet by mouth daily with breakfast. 90 tablet 0    polypropylene glycol- 0.4-0.3 % Ophthalmic Solution Place 1 drop into both eyes as needed (Dry eyes). metoprolol tartrate 50 MG Oral Tab Take 1 tablet 50 mg every morning and 1/2 tablet 25 mg in the evening      losartan 100 MG Oral Tab Take 1 tablet (100 mg total) by mouth daily. 90 tablet 3    gabapentin 300 MG Oral Cap 1 pill in am and noon and 2 pills at night (Patient taking differently: Take 1 capsule (300 mg total) by mouth in the morning and 1 capsule (300 mg total) at noon and 1 capsule (300 mg total) in the evening. 1 pill in am and noon and pm.) 360 capsule 3    rivaroxaban 15 MG Oral Tab Take 1 tablet (15 mg total) by mouth daily with food. 30 tablet 0    acetaminophen 500 MG Oral Tab Take 1 tablet (500 mg total) by mouth every 6 (six) hours as needed for Fever (equal to or greater than 100.4). 1 tablet 0    famotidine 20 MG Oral Tab Take 1 tablet (20 mg total) by mouth 2 (two) times daily before meals. 180 tablet 3    MELATONIN OR Take 1 tablet by mouth 5 days out of the week. No weekends      Omega-3 Fatty Acids (FISH OIL OR) Take 1 teaspoon by mouth daily      Ascorbic Acid (VITAMIN C) 100 MG Oral Tab Take 1 tablet (100 mg total) by mouth daily.       Multiple Vitamins-Minerals (MULTIVITAMIN ADULTS 50+) Oral Tab Take 1 tablet by mouth daily. Cholecalciferol (VITAMIN D3) 25 MCG (1000 UT) Oral Cap Take 1 tablet by mouth daily. aspirin 81 MG Oral Tab Take 1 tablet (81 mg total) by mouth daily. Allergies:No Known Allergies   There were no vitals taken for this visit. There is no height or weight on file to calculate BMI. PHYSICAL EXAM:   Physical Exam  Constitutional:       General: She is not in acute distress. Appearance: Normal appearance. Skin:     General: Skin is warm and dry. Findings: Rash present. Comments: No draining or tenderness noted. Lip slightly swollen on the left side on lower lip. No scaling noted. She does have a small blister from trauma. Sks noted on the back. Stuck on appearance noted. No draining or tenderness noted. Black and tan in color. Neurological:      Mental Status: She is alert and oriented to person, place, and time. ASSESSMENT/PLAN:   1. Seborrheic keratosis  -After discussion with patient, advised the following:  -Benign lesions  -Watch for now  -Return if there are changes.   -Pt was agreeable to plan and will comply with discussion above. 2. Lip biting  -After discussion with patient, advised the following:  -Most probably trauma from biting  -Advised to be more careful as she can be   -Return if there are changes to lip noted. -To call or follow-up with worsening symptoms or concerns.   -Pt was agreeable to plan and will comply with discussion above. No orders of the defined types were placed in this encounter.       Meds This Visit:  Requested Prescriptions      No prescriptions requested or ordered in this encounter       Imaging & Referrals:  None         XN#[id number]

## 2023-11-30 ENCOUNTER — MED REC SCAN ONLY (OUTPATIENT)
Dept: INTERNAL MEDICINE CLINIC | Facility: CLINIC | Age: 83
End: 2023-11-30

## 2024-01-29 ENCOUNTER — OFFICE VISIT (OUTPATIENT)
Dept: INTERNAL MEDICINE CLINIC | Facility: CLINIC | Age: 84
End: 2024-01-29

## 2024-01-29 ENCOUNTER — MED REC SCAN ONLY (OUTPATIENT)
Dept: INTERNAL MEDICINE CLINIC | Facility: CLINIC | Age: 84
End: 2024-01-29

## 2024-01-29 VITALS
HEIGHT: 56 IN | SYSTOLIC BLOOD PRESSURE: 168 MMHG | WEIGHT: 110 LBS | HEART RATE: 77 BPM | DIASTOLIC BLOOD PRESSURE: 76 MMHG | BODY MASS INDEX: 24.75 KG/M2

## 2024-01-29 DIAGNOSIS — G50.0 SUPRAORBITAL NEURALGIA: ICD-10-CM

## 2024-01-29 DIAGNOSIS — G25.81 RESTLESS LEGS SYNDROME: ICD-10-CM

## 2024-01-29 DIAGNOSIS — C31.9 CANCER OF NASAL CAVITY AND SINUS (HCC): ICD-10-CM

## 2024-01-29 DIAGNOSIS — Z00.00 MEDICARE ANNUAL WELLNESS VISIT, SUBSEQUENT: Primary | ICD-10-CM

## 2024-01-29 DIAGNOSIS — I48.91 NEW ONSET A-FIB (HCC): ICD-10-CM

## 2024-01-29 DIAGNOSIS — I10 ESSENTIAL HYPERTENSION: ICD-10-CM

## 2024-01-29 DIAGNOSIS — C30.0 CANCER OF NASAL CAVITY AND SINUS (HCC): ICD-10-CM

## 2024-01-29 NOTE — PROGRESS NOTES
Subjective:   Carmen Cox is a 84 year old female who presents for a Medicare Subsequent Annual Wellness visit (Pt already had Initial Annual Wellness).   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   denies chest pain, shortness of breath, dyspnea on exertion or heart palpitations also denies nausea, vomiting, diarrhea, constipation or abdominal pain. No fever, chills, or UTI symptoms.   Has left eye forehead and head ache and feeling of tingling - chronic now  improved .\with rtg and chemo th.  The rest of the review of systems, see below.     Bp elevated -had bp checked at home 140-190 s has metoprolol 2 tab bid now recently increased from cardiologist     History/Other:   Fall Risk Assessment:   She has been screened for Falls and is low risk.      Cognitive Assessment:   She had a completely normal cognitive assessment - see flowsheet entries     Functional Ability/Status:   Carmen Cox has a completely normal functional assessment. See flowsheet for details.        Depression Screening (PHQ-2/PHQ-9): PHQ-2 SCORE: 0  , done 1/29/2024   Trouble falling or staying asleep, or sleeping too much: 1     Feeling tired or having little energy: 2    Poor appetite or overeating: 3    Trouble concentrating on things, such as reading the newspaper or watching television: 1    [place] Suicide Screening on 1/29/2024 was No Risk.       Advanced Directives:   She does have a Living Will but we do NOT have it on file in Epic.    She does NOT have a Power of  for Health Care. [Do you have a healthcare power of ?: No]  Discussed Advance Care Planning with patient (and family/surrogate if present). Standard forms made available to patient in After Visit Summary.      Patient Active Problem List   Diagnosis    Restless legs syndrome    Essential hypertension    New onset  a-fib ([facility])    Age-related nuclear cataract of both eyes    Vitreous floaters of both eyes    Vertical diplopia    Left superior oblique palsy    Hyperopia of both eyes with astigmatism    Presbyopia of both eyes    SPK (superficial punctate keratitis), bilateral    Esotropia    Chronic facial pain    Sepsis due to undetermined organism ([facility])    Acute cystitis without hematuria    Supraorbital neuralgia    Hx: UTI (urinary tract infection)    Bradycardia    CKD (chronic kidney disease) stage 3, GFR 30-59 ml/min ([facility])    CN III palsy, left eye    Ptosis of eyelid, left    Preop cardiovascular exam    Splinter    Trichiasis of left lower eyelid    Squamous blepharitis of upper and lower eyelids of both eyes    Confusion    Acute pyelonephritis    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    Current Medications:  Outpatient Medications Marked as Taking for the 1/29/24 encounter (Office Visit) with Subhash Lewis MD   Medication Sig    polypropylene glycol- 0.4-0.3 % Ophthalmic Solution Place 1 drop into both eyes as needed (Dry eyes).    metoprolol tartrate 50 MG Oral Tab Take 2 tablet  every morning and 2 tablets in the evening for 2 weeks.    losartan 100 MG Oral Tab Take 1 tablet (100 mg total) by mouth daily.    gabapentin 300 MG Oral Cap 1 pill in am and noon and 2 pills at night (Patient taking differently: Take 1 capsule (300 mg total) by mouth in the morning and 1 capsule (300 mg total) at noon and 1 capsule (300 mg total) in the evening. 1 pill in am and noon and pm.)    rivaroxaban 15 MG Oral Tab Take 1 tablet (15 mg total) by mouth daily with food.    acetaminophen 500 MG Oral Tab Take 1 tablet (500 mg total) by mouth every 6 (six) hours as needed for Fever (equal to or greater than 100.4).    famotidine 20 MG Oral Tab Take 1 tablet (20 mg total) by mouth 2 (two) times daily before meals.    MELATONIN OR Take 1 tablet by mouth 5 days out of the week.  No  weekends    Omega-3 Fatty Acids (FISH OIL OR) Take 1 teaspoon by mouth daily    Ascorbic Acid (VITAMIN C) 100 MG Oral Tab Take 1 tablet (100 mg total) by mouth daily.    Multiple Vitamins-Minerals (MULTIVITAMIN ADULTS 50+) Oral Tab Take 1 tablet by mouth daily.    Cholecalciferol (VITAMIN D3) 25 MCG (1000 UT) Oral Cap Take 1 tablet by mouth daily.    aspirin 81 MG Oral Tab Take 1 tablet (81 mg total) by mouth daily.       Medical History:  She  has a past medical history of Afib (HCC), Blurry vision, left eye (09/30/2021), Cancer (HCC), Decreased GFR (08/12/2019), Essential hypertension, Heart palpitations (09/30/2021), Hypertension, Lipid screening (06/25/2014), Malaise and fatigue (03/24/2017), Onychia of toe of left foot (2009), Skin lesions (06/21/2021), and Syncope and collapse (09/30/2021).  Surgical History:  She  has a past surgical history that includes appendectomy.   Family History:  Her family history is not on file.  Social History:  She  reports that she has never smoked. She has never been exposed to tobacco smoke. She has never used smokeless tobacco. She reports that she does not drink alcohol and does not use drugs.    Tobacco:  She has never smoked tobacco.    CAGE Alcohol Screen:   CAGE screening score of 0 on 1/29/2024, showing low risk of alcohol abuse.      Patient Care Team:  Subhash Lewis MD as PCP - General (Internal Medicine)  Thaddeus Cassidy MD (NEUROLOGY)  Chris Sotelo MD (SURGERY, GENERAL)    Review of Systems   Constitutional:  Negative for chills, fatigue and fever.   HENT: Negative.  Negative for ear pain and sore throat.    Eyes:  Negative for pain (left eye improved presssure) and redness.   Respiratory:  Negative for cough, shortness of breath and wheezing.    Cardiovascular:  Negative for chest pain, palpitations and leg swelling.   Gastrointestinal: Negative.  Negative for abdominal pain, constipation, diarrhea, nausea and vomiting.   Genitourinary: Negative.  Negative  for dysuria and frequency.   Musculoskeletal:  Negative for arthralgias.   Skin: Negative.  Negative for pallor.   Neurological: Negative.  Negative for dizziness and headaches.   Psychiatric/Behavioral: Negative.  Negative for confusion. The patient is not nervous/anxious.        Objective:   Physical Exam  General Appearance:  Alert, cooperative, no distress, appears stated age   Head:  Normocephalic, without obvious abnormality, atraumatic   Eyes:  PERRL, conjunctiva/corneas clear, no pain in eyes left eye -improved pressure    Ears:  Normal TM's and external ear canals, both ears   Nose: Nares normal, septum midline,mucosa normal, no drainage or sinus tenderness   Throat: Lips, mucosa, and tongue normal;  gums normal   Neck: Supple, symmetrical, trachea midline, no adenopathy;  thyroid: not enlarged, symmetric, no tenderness/mass/nodules; no carotid bruit or JVD   Back:   Symmetric, no curvature, ROM good no CVA tenderness   Lungs:   Clear to auscultation bilaterally, respirations unlabored   Heart:  Regular rate and rhythm, S1 and S2 normal, no murmur, rub, or gallop   Abdomen:   Soft, non-tender, bowel sounds active all four quadrants,  no masses, no organomegaly bm  once per day sometimes dark and  watery -since chemo th - no red or blood in stool .   Pelvic: Deferred   Extremities: Extremities normal, atraumatic, no cyanosis or edema   Pulses: 2+ and symmetric   Skin: Skin color, texture, turgor normal, no rashes or lesions   Lymph nodes: Cervical, supraclavicular, and axillary nodes normal   Neurologic: Normal       BP (!) 168/76   Pulse 77   Ht 4' 8\" (1.422 m)   Wt 110 lb (49.9 kg)   BMI 24.66 kg/m²  Estimated body mass index is 24.66 kg/m² as calculated from the following:    Height as of this encounter: 4' 8\" (1.422 m).    Weight as of this encounter: 110 lb (49.9 kg).    Medicare Hearing Assessment:   Hearing Screening    Time taken: 1/29/2024  2:42 PM  Entry User: Rojas Crowder LPN  Screening Method:  Questionnaire  I have a problem hearing over the telephone: Sometimes I have trouble following the conversations when two or more people are talking at the same time: No   I have trouble understanding things on the TV: No I have to strain to understand conversations: No   I have to worry about missing the telephone ring or doorbell: No I have trouble hearing conversations in a noisy background such as a crowded room or restaurant: Yes   I get confused about where sounds come from: No I misunderstand some words in a sentence and need to ask people to repeat themselves: No   I especially have trouble understanding the speech of women and children: No I have trouble understanding the speaker in a large room such as at a meeting or place of [place]: No   Many people I talk to seem to mumble (or don't speak clearly): No People get annoyed because I misunderstand what they say: No   I misunderstand what others are saying and make inappropriate responses: No I avoid social activities because I cannot hear well and fear I will reply improperly: No   Family members and friends have told me they think I may have hearing loss: No             Visual Acuity:   Right Eye Visual Acuity: Corrected Right Eye Chart Acuity: 20/70   Left Eye Visual Acuity: Corrected Left Eye Chart Acuity: 20/70   Both Eyes Visual Acuity: Corrected Both Eyes Chart Acuity: 20/50          Blood pressure (!) 168/76, pulse 77, height 4' 8\" (1.422 m), weight 110 lb (49.9 kg).      Assessment & Plan:   Carmen Cox is a 84 year old female who presents for a Medicare Assessment.      1. Medicare  physical exam  Maintain a healthy diet , low saturated fat and low sugar diet  Keep good hydration  Maintain a regular activity /walking as tolerated   Complete labs as ordered,   Preventative health maintenance tests reviewed   Immunizations reviewed    -recommended - shingrix   RSV -vaccines   Colonosopy reasonably  refused   Mammogram - refused   Dexa scan -  utd  Advanced directives discussed with patient POA - Viry Lyons , daughter  and Sakshi trujillo   Patient verbalized understanding and compliance       Nasal cavity,sinus  carcinoma   On  chemo x 3 -   3 weeks apart   For 12 weeks next  chemo-on 2/9/24   Will have MRI    and f/[facility] Dr Rivas at [facility]   Stable cpm      Essential hypertension (Primary)  Stable   Take high blood pressure medication as perscribed   Keep a Low- sodium diet   Maintain walking and activity - as tolerated   Keep a log of blood pressure and heart rate at home   Patient verbalizes understanding and compliance  Metoprolol 50 mg 2  tab  bid   Losartan 100 mg every day   Due to elevated bp today  -recommend to add amlodipine 5   mg if bp still elevated just had increase metoprolol 2 bid Will call and   talk to her cardiology in few days   CPM  Monitor bp   Call if any concerns pr sy or elevated bp  Continue present management       2. Atrial fibrillation, unspecified type (HCC)  CPM - xarelto 15 mg every day   Patient follows with cardiology Dr Juan Stiles   On BB stable cpm       3. Restless legs syndrome  CPM, stable   Follows with neurology     4. Age-related nuclear cataract of both eyes  CPM follows with ophthalmology     Supraorbital neuralgia   Stable cpm     The patient and her daughter Viry indicates understanding of these issues and agrees to the plan.  Continue with current treatment plan.  Reinforced healthy diet, lifestyle, and exercise.      Return in about 1 month (around 2/29/2024), or if symptoms worsen or fail to improve, for Follow up visit, Blood pressure check.       Supplementary Documentation:   General Health:  In the past six months, have you lost more than 10 pounds without trying?: 2 - No  Has your appetite been poor?: No  Type of Diet: Balanced  How does the patient maintain a good energy level?:  (housework, gardening)  How would you describe your daily physical activity?: Light  How would you  describe your current health state?: Good  How do you maintain positive mental well-being?: Social Interaction;Puzzles;Visiting Family  On a scale of 0 to 10, with 0 being no pain and 10 being severe pain, what is your pain level?: 0 - (None)  In the past six months, have you experienced urine leakage?: 0-No  At any time do you feel concerned for the safety/well-being of yourself and/or your children, in your home or elsewhere?: No  Have you had any immunizations at another office such as Influenza, Hepatitis B, Tetanus, or Pneumococcal?: No       Carmen Cox's SCREENING SCHEDULE   Tests on this list are recommended by your physician but may not be covered, or covered at this frequency, by your insurer.   Please check with your insurance carrier before scheduling to verify coverage.   PREVENTATIVE SERVICES FREQUENCY &  COVERAGE DETAILS LAST COMPLETION DATE   Diabetes Screening    Fasting Blood Sugar /  Glucose    One screening every 12 months if never tested or if previously tested but not diagnosed with pre-diabetes   One screening every 6 months if diagnosed with pre-diabetes Lab Results   Component Value Date    GLU 84 11/17/2023        Cardiovascular Disease Screening    Lipid Panel  Cholesterol  Lipoprotein (HDL)  Triglycerides Covered every 5 years for all Medicare beneficiaries without apparent signs or symptoms of cardiovascular disease Lab Results   Component Value Date    CHOLEST 160 04/21/2023    HDL 59 04/21/2023    LDL 87 04/21/2023    TRIG 71 04/21/2023         Electrocardiogram (EKG)   Covered if needed at Welcome to Medicare, and non-screening if indicated for medical reasons 11/16/2023      Ultrasound Screening for Abdominal Aortic Aneurysm (AAA) Covered once in a lifetime for one of the following risk factors    Men who are 65-75 years old and have ever smoked    Anyone with a family history -     Colorectal Cancer Screening  Covered for ages 50-85; only need ONE of the following:    Colonoscopy    Covered every 10 years    Covered every 2 years if patient is at high risk or previous colonoscopy was abnormal -    No recommendations at this time    Flexible Sigmoidoscopy   Covered every 4 years -    Fecal Occult Blood Test Covered annually -   Bone Density Screening    Bone density screening    Covered every 2 years after age 65 if diagnosed with risk of osteoporosis or estrogen deficiency.    Covered yearly for long-term glucocorticoid medication use (Steroids) Last Dexa Scan:    XR DEXA BONE DENSITOMETRY (CPT=77080) 04/11/2023      No recommendations at this time   Pap and Pelvic    Pap   Covered every 2 years for women at normal risk; Annually if at high risk -  No recommendations at this time    Chlamydia Annually if high risk -  No recommendations at this time   Screening Mammogram    Mammogram     Recommend annually for all female patients aged 40 and older    One baseline mammogram covered for patients aged 35-39 -    No recommendations at this time    Immunizations    Influenza Covered once per flu season  Please get every year 11/20/2023  No recommendations at this time    Pneumococcal Each vaccine ([id number] & [id number]) covered once after 65 Prevnar 13: 07/26/2018    [id number]: -     No recommendations at this time    Hepatitis B One screening covered for patients with certain risk factors   -  No recommendations at this time    Tetanus Toxoid Not covered by Medicare Part B unless medically necessary (cut with metal); may be covered with your pharmacy prescription benefits -    Tetanus, Diptheria and Pertusis TD and TDaP Not covered by Medicare Part B -  No recommendations at this time    Zoster Not covered by Medicare Part B; may be covered with your pharmacy  prescription benefits -  No recommendations at this time     Annual Monitoring of Persistent Medications (ACE/ARB, digoxin diuretics, anticonvulsants)    Potassium Annually Lab Results   Component Value Date    K 3.8 11/17/2023          Creatinine   Annually Lab Results   Component Value Date    CREATSERUM 0.82 11/17/2023         BUN Annually Lab Results   Component Value Date    BUN 10 11/17/2023       Drug Serum Conc Annually No results found for: \"DIGOXIN\", \"DIG\", \"VALP\"

## 2024-01-30 ENCOUNTER — TELEPHONE (OUTPATIENT)
Dept: INTERNAL MEDICINE CLINIC | Facility: CLINIC | Age: 84
End: 2024-01-30

## 2024-01-30 RX ORDER — AMLODIPINE BESYLATE 5 MG/1
5 TABLET ORAL DAILY
Qty: 90 TABLET | Refills: 0 | Status: SHIPPED | OUTPATIENT
Start: 2024-01-30 | End: 2025-01-24

## 2024-01-30 NOTE — TELEPHONE ENCOUNTER
Spoke with patient, (  Name and  verified ) informed of Dr. Lewis 's  instructions below    Patient  daughter verbalizes understanding and agrees with plan.

## 2024-01-30 NOTE — TELEPHONE ENCOUNTER
Dtr Viry calling for patient today post visit 1/29/24    Viry called cardio office today and they approve the patient to take the amlodipine suggested by Dr. Lewis and Viry and patient asking for this to be sent to pharmacy on file for her, [facility].     Please advise on new RX for patient.     Dtr would like to be notified when RX sent to pharmacy for patient.

## 2024-01-30 NOTE — TELEPHONE ENCOUNTER
Noted approved amlodipine 5 mg qam     Pt to  keep checking BP  and  HR -log and provide to  Cadiologist -seeing soon in few days .

## 2024-02-13 ENCOUNTER — OFFICE VISIT (OUTPATIENT)
Dept: NEUROLOGY | Facility: CLINIC | Age: 84
End: 2024-02-13
Payer: MEDICARE

## 2024-02-13 DIAGNOSIS — R51.9 CHRONIC FACIAL PAIN: ICD-10-CM

## 2024-02-13 DIAGNOSIS — R20.2 PARESTHESIA: ICD-10-CM

## 2024-02-13 DIAGNOSIS — G50.0 SUPRAORBITAL NEURALGIA: Primary | ICD-10-CM

## 2024-02-13 DIAGNOSIS — G25.81 RLS (RESTLESS LEGS SYNDROME): ICD-10-CM

## 2024-02-13 DIAGNOSIS — G89.29 CHRONIC FACIAL PAIN: ICD-10-CM

## 2024-02-13 DIAGNOSIS — H49.12 LEFT SUPERIOR OBLIQUE PALSY: ICD-10-CM

## 2024-02-13 RX ORDER — GABAPENTIN 300 MG/1
CAPSULE ORAL
Qty: 270 CAPSULE | Refills: 3 | Status: SHIPPED | OUTPATIENT
Start: 2024-02-13

## 2024-02-13 NOTE — PROGRESS NOTES
Neurology follow up Visit     Referred By: Dr. Donnelly ref. provider found    Chief Complaint:   Chief Complaint   Patient presents with    Neurologic Problem     LOV 08/02/2023 Patient presents with Supraorbital neuralgia and RLS. Patient needs a refill on her Gabapatin        HPI:     Carmen Cox is a 84 year old female, who presents for paresthesias, restless legs.  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  She eventually went to see doctors and was worked up including MRI of the brain, echocardiogram, Doppler of carotid arteries, MRI of the brain did show significant amount of white matter changes but no acute strokes.  Patient still has some difficulty with managing her blood pressure primarily.  In the meantime she also was found to have atrial fibrillation and therefore she was placed on Xarelto.  She continued with feeling of paresthesias over the left side of the face.  It is moderately bothersome to her.  Patient also has history of restless leg syndrome, especially at night, difficulty falling asleep, ropinirole at 0.25 mg was already helpful.    Left facial symptoms, coupled with diplopia, raised a strong suspicion for small vessel stroke that might have happened back in August 2021.  Patient was appropriately treated with Xarelto, blood pressure medications, LDL was 92.  Goal is below 70.  However her blood pressure remained poorly controlled.  She was addressing it with her primary care doctor or cardiologist.    To help with both her restless leg syndrome paresthesias we stopped ropinirole and instead start her on gabapentin slowly tapering of the dose went on a follow-up appointment in February 2022 she was on 100 milligrams in the morning, 100 mg at noon and 300 mg at night.  And while her restless leg symptoms were still well controlled, her pain was still quite  bothersome especially at night.     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    Dose of gabapentin was increased.  Patient came back for follow-up in August 2022.  Doing slightly better.  Mainly her symptoms of crawling electrical sensation in the left forehead was appearing when she was going to sleep.    Patient came back for follow-up in May 2023.  Continued with facial symmetry on left side, but also facial pain, both forehead, cheek and top of the head on the left side.  It seems that the supraorbital blocks were not helpful and therefore it was repeated again.    Occasional episodes of lightheadedness.    Patient came back for follow-up in August 2023, facial pain was coming back, but she did not want to do injections again even though they were helpful in the past.  She was developing some kind of left eye deviation she is following with an ophthalmologist.  Her restless was getting worse at night.  We increased the dose of gabapentin especially at night.    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  Not surgically repairable, but treatment was started with chemotherapy.    Patient came back for follow-up in February 2024, doing well with tremors of restless legs and paresthesias in her legs as well as supraorbital pain.    Past Medical History:   Diagnosis Date    Afib (HCC)     Blurry vision, left eye 09/30/2021    Cancer (HCC)     Decreased GFR 08/12/2019    Essential hypertension     Heart palpitations 09/30/2021    Hypertension     Lipid screening 06/25/2014    Malaise and fatigue 03/24/2017    Onychia of toe of left foot 2009    Left great toe/Depbridement of at least 2/3 toenail    Skin lesions 06/21/2021    Syncope  and collapse 09/30/2021       Past Surgical History:   Procedure Laterality Date    APPENDECTOMY         Social history:  History   Smoking Status    Never   Smokeless Tobacco    Never       History   Alcohol Use No       History   Drug Use No         Family History   Problem Relation Age of Onset    Glaucoma Neg     Macular degeneration Neg     Diabetes Neg          Current Outpatient Medications:     amLODIPine 5 MG Oral Tab, Take 1 tablet (5 mg total) by mouth daily., Disp: 90 tablet, Rfl: 0    polypropylene glycol- 0.4-0.3 % Ophthalmic Solution, Place 1 drop into both eyes as needed (Dry eyes)., Disp: , Rfl:     metoprolol tartrate 50 MG Oral Tab, Take 2 tablet  every morning and 2 tablets in the evening for 2 weeks., Disp: , Rfl:     losartan 100 MG Oral Tab, Take 1 tablet (100 mg total) by mouth daily., Disp: 90 tablet, Rfl: 3    gabapentin 300 MG Oral Cap, 1 pill in am and noon and 2 pills at night (Patient taking differently: Take 1 capsule (300 mg total) by mouth in the morning and 1 capsule (300 mg total) at noon and 1 capsule (300 mg total) in the evening. 1 pill in am and noon and pm.), Disp: 360 capsule, Rfl: 3    rivaroxaban 15 MG Oral Tab, Take 1 tablet (15 mg total) by mouth daily with food., Disp: 30 tablet, Rfl: 0    acetaminophen 500 MG Oral Tab, Take 1 tablet (500 mg total) by mouth every 6 (six) hours as needed for Fever (equal to or greater than 100.4)., Disp: 1 tablet, Rfl: 0    famotidine 20 MG Oral Tab, Take 1 tablet (20 mg total) by mouth 2 (two) times daily before meals., Disp: 180 tablet, Rfl: 3    MELATONIN OR, Take 1 tablet by mouth 5 days out of the week.  No weekends, Disp: , Rfl:     Omega-3 Fatty Acids (FISH OIL OR), Take 1 teaspoon by mouth daily, Disp: , Rfl:     Ascorbic Acid (VITAMIN C) 100 MG Oral Tab, Take 1 tablet (100 mg total) by mouth daily., Disp: , Rfl:     Multiple Vitamins-Minerals (MULTIVITAMIN ADULTS 50+) Oral Tab, Take 1 tablet by mouth daily., Disp: , Rfl:      Cholecalciferol (VITAMIN D3) 25 MCG (1000 UT) Oral Cap, Take 1 tablet by mouth daily., Disp: , Rfl:     aspirin 81 MG Oral Tab, Take 1 tablet (81 mg total) by mouth daily., Disp: , Rfl:     No Known Allergies    ROS:   As in HPI, the rest of the 14 system review was done and was negative      Physical Exam:  There were no vitals filed for this visit.    General: No apparent distress, well nourished, well groomed.  Head- Normocephalic, atraumatic  Eyes- No redness or swelling  ENT- Hearing intake, normal glutition  Neck- No masses or adenopathy  Cv: pulses were palpable and normal, no cyanosis or edema     Neurological:     Mental Status- Alert and oriented x3.  Normal attention span and concentration  Thought process intact  Memory intact- recent and remote  Mood intact  Fund of knowledge appropriate for education and age    Language intact including: comprehension, naming, repetition, vocabulary    Cranial Nerves:  II.- Visual fields full to confrontation    III, IV, VI- EOM intact, TAY  V. Facial sensation decreased over V1 left-sided distribution  VII. Face symmetric, no facial weakness  VIII. Hearing intact to whisper.  IX. Pallet elevates symmetrically.  XI. Shoulder shrug is intact  XII. Tongue is midline    Motor Exam:  Muscle tone normal  No atrophy or fasciculations  Strength- upper extremities 5/5 proximally and distally                  - lower  extremities 5/5 proximally and distally    Sensory Exam:  Light touch sensation- intact in all 4 extremities    No clonus  No Babinski sign    Coordination:  Finger to nose intact  Rapid alternating movements intact    Gait:  Normal posture  Normal physiologic      Labs:    Lab Results   Component Value Date    TSH 2.160 04/21/2023     Lab Results   Component Value Date    HDL 59 04/21/2023    LDL 87 04/21/2023    TRIG 71 04/21/2023     Lab Results   Component Value Date    HGB 10.7 (L) 11/17/2023    HCT 32.4 (L) 11/17/2023    MCV 86.6 11/17/2023    WBC 2.5  (L) 11/17/2023    .0 11/17/2023      Lab Results   Component Value Date    BUN 10 11/17/2023    CA 8.6 (L) 11/17/2023    ALT 27 11/17/2023    AST 49 (H) 11/17/2023    ALKPHOS 63 06/25/2015    ALB 3.3 11/17/2023     11/17/2023    K 3.8 11/17/2023     11/17/2023    CO2 25.0 11/17/2023      I have reviewed labs.    Imaging Studies:  I have independently reviewed imaging.  MRI of the brain as above        Assessment   1. RLS (restless legs syndrome)  Patient with restless leg syndrome.  Patient will remain on gabapentin since it was helpful with restless leg syndrome symptoms will increase the dose at night specifically.    2. Paresthesia  Left facial, coupled with diplopia, diagnosed with malignancy.  Treated appropriately with [place] specialist.  In the meantime paresthesias paresthesias and other sensation is well-controlled with 300 g of gabapentin 3 times a day.      3. Diplopia  Myasthenia panel was negative           Education and counseling provided to patient. Instructed patient to call my office or seek medical attention immediately if symptoms worsen.  Patient verbalized understanding of information given. All questions were answered. All side effects of drugs were discussed.     Return to clinic in: No follow-ups on file.    Thaddeus Cassidy MD

## 2024-02-28 ENCOUNTER — MED REC SCAN ONLY (OUTPATIENT)
Dept: INTERNAL MEDICINE CLINIC | Facility: CLINIC | Age: 84
End: 2024-02-28

## 2024-03-31 ENCOUNTER — PATIENT MESSAGE (OUTPATIENT)
Dept: INTERNAL MEDICINE CLINIC | Facility: CLINIC | Age: 84
End: 2024-03-31

## 2024-04-01 ENCOUNTER — OFFICE VISIT (OUTPATIENT)
Dept: INTERNAL MEDICINE CLINIC | Facility: CLINIC | Age: 84
End: 2024-04-01

## 2024-04-01 ENCOUNTER — NURSE TRIAGE (OUTPATIENT)
Dept: INTERNAL MEDICINE CLINIC | Facility: CLINIC | Age: 84
End: 2024-04-01

## 2024-04-01 VITALS
WEIGHT: 107 LBS | BODY MASS INDEX: 24.07 KG/M2 | DIASTOLIC BLOOD PRESSURE: 56 MMHG | OXYGEN SATURATION: 99 % | HEART RATE: 58 BPM | SYSTOLIC BLOOD PRESSURE: 122 MMHG | HEIGHT: 56 IN

## 2024-04-01 DIAGNOSIS — E53.8 VITAMIN B12 DEFICIENCY: ICD-10-CM

## 2024-04-01 DIAGNOSIS — R20.0 NUMBNESS AND TINGLING IN RIGHT HAND: Primary | ICD-10-CM

## 2024-04-01 DIAGNOSIS — I10 ESSENTIAL HYPERTENSION: ICD-10-CM

## 2024-04-01 DIAGNOSIS — M54.2 NECK PAIN: ICD-10-CM

## 2024-04-01 DIAGNOSIS — R20.2 NUMBNESS AND TINGLING IN RIGHT HAND: Primary | ICD-10-CM

## 2024-04-01 PROCEDURE — 1160F RVW MEDS BY RX/DR IN RCRD: CPT | Performed by: INTERNAL MEDICINE

## 2024-04-01 PROCEDURE — 1159F MED LIST DOCD IN RCRD: CPT | Performed by: INTERNAL MEDICINE

## 2024-04-01 PROCEDURE — 3074F SYST BP LT 130 MM HG: CPT | Performed by: INTERNAL MEDICINE

## 2024-04-01 PROCEDURE — 1126F AMNT PAIN NOTED NONE PRSNT: CPT | Performed by: INTERNAL MEDICINE

## 2024-04-01 PROCEDURE — 3078F DIAST BP <80 MM HG: CPT | Performed by: INTERNAL MEDICINE

## 2024-04-01 PROCEDURE — 3008F BODY MASS INDEX DOCD: CPT | Performed by: INTERNAL MEDICINE

## 2024-04-01 PROCEDURE — 99214 OFFICE O/P EST MOD 30 MIN: CPT | Performed by: INTERNAL MEDICINE

## 2024-04-01 NOTE — TELEPHONE ENCOUNTER
FYI     Daughter of Patient called office. Date of birth and full name both confirmed. On verbal release   Regarding mychart message.   Chemo doctor recommended patient to see PCP - possible neuropathy since bilateral hands tingling with some pain.   Always resolves after breakfast every day  Triaged per protocol and advised care advice per protocol.   More details regarding Symptoms under Additional Documentation > Protocols Used.     Evaluation advised today.   Appointment made , location verified. .       Advised to monitor symptoms.  RN advised if symptoms get severely worse, patient should seek care at Emergency Room or Immediate Care.  RN also informed patient to seek immediate medical attention at ER if patient experiences severe/worsening symptoms, shortness of breath, chest pain, or severe pain.  Patient verbalizes understanding and is agreeable to instructions.     Future Appointments   Date Time Provider Department Center   4/1/2024  2:40 PM Subhash Lewis MD [facility]   4/15/2024  1:40 PM Subhash Lewis MD [facility]         Carmen Kenny LOPEZ Em Triage Support (supporting Subhash Lewis MD)12 hours ago (10:15 PM)       Hi Dr. LewisCarmen has been experiencing some numbness in her right arm and hand and fingers for about a week. It wakes her up and then goes away after breakfast.  I contacted her chemo doctor, Dr. Palmer since numbness was listed as a side effect. His assessment is that it is possibly arthritis or a bulging disc and suggests physical therapy. And to contact her primary doctor. Online it said it could be a change in blood pressure reducing meds which is true of her.      Is there any chance we can see you this week to discuss this? I think I remember you have an 11 am opening you leave open.      Thank you!  Carmen Baires’s daughter  Reason for Disposition   Patient wants to be seen    Protocols used: Neurologic Deficit-A-[place]

## 2024-04-01 NOTE — PROGRESS NOTES
Subjective:   Carmen Cox is a 84 year old     Chief Complaint   Patient presents with    Tingling     C/o on/off tingling in the right arm, hand and fingers for about a week   Patient for evaluation right hand and fingers for about 1 week.  Patient states she received chemotherapy about 1 week ago ,she started having some tingling sensation in the lower of the right arm and fingers    patient states this is pretty much only on the right side does not experience any left-sided arm or hand tingling sensation.  Patient states she did not feel much of the pain ,but just felt some local tenderness on the area of the needle puncture.  Has some bruising around that area but does not feel any swelling or redness,symptoms improved and only present in AM  after waking up.  Patient send and tingling only is present in the morning after sleeping and goes away gradually after couple hours.  Does not feel a tingling sensation throughout the day or at night.  Patient states she is usually sleeping on that side of the arm and shoulder and she admits that she is not taking good position of her neck at this time to rest or with  sleeping.    Patient states that at times her neck is either crooked on the side or hyperextended or flexed ,she feels some stiffness in the neck area but not necessarily significant pain or the pain going down the arm.  Patient denies other symptoms or concerns at this time she is happy with her left eye but is more open , and looks much better ,right now active treatments with her oncologist.     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   denies chest pain, shortness of breath, dyspnea on exertion or heart palpitations also denies nausea, vomiting, diarrhea, constipation or abdominal pain. No fever, chills.    The rest of the review of systems, see below.     Allergies:  She has No Known Allergies.    Current  Medications:  Outpatient Medications Marked as Taking for the 4/1/24 encounter (Office Visit) with Subhash Lewis MD   Medication Sig    gabapentin 300 MG Oral Cap 1 pill TID    amLODIPine 5 MG Oral Tab Take 1 tablet (5 mg total) by mouth daily.    polypropylene glycol- 0.4-0.3 % Ophthalmic Solution Place 1 drop into both eyes as needed (Dry eyes).    metoprolol tartrate 50 MG Oral Tab Take 2 tablet  every morning and 2 tablets in the evening for 2 weeks.    losartan 100 MG Oral Tab Take 1 tablet (100 mg total) by mouth daily.    rivaroxaban 15 MG Oral Tab Take 1 tablet (15 mg total) by mouth daily with food.    acetaminophen 500 MG Oral Tab Take 1 tablet (500 mg total) by mouth every 6 (six) hours as needed for Fever (equal to or greater than 100.4).    famotidine 20 MG Oral Tab Take 1 tablet (20 mg total) by mouth 2 (two) times daily before meals.    MELATONIN OR Take 1 tablet by mouth 5 days out of the week.  No weekends    Omega-3 Fatty Acids (FISH OIL OR) Take 1 teaspoon by mouth daily    Ascorbic Acid (VITAMIN C) 100 MG Oral Tab Take 1 tablet (100 mg total) by mouth daily.    Multiple Vitamins-Minerals (MULTIVITAMIN ADULTS 50+) Oral Tab Take 1 tablet by mouth daily.    Cholecalciferol (VITAMIN D3) 25 MCG (1000 UT) Oral Cap Take 1 tablet by mouth daily.    aspirin 81 MG Oral Tab Take 1 tablet (81 mg total) by mouth daily.       Medical History:  She  has a past medical history of Afib (HCC), Blurry vision, left eye (09/30/2021), Cancer (HCC), Decreased GFR (08/12/2019), Essential hypertension, Heart palpitations (09/30/2021), Hypertension, Lipid screening (06/25/2014), Malaise and fatigue (03/24/2017), Onychia of toe of left foot (2009), Skin lesions (06/21/2021), and Syncope and collapse (09/30/2021).  Surgical History:  She  has a past surgical history that includes appendectomy.   Family History:  Her family history is not on file.  Social History:  She  reports that she has never smoked. She has  never been exposed to tobacco smoke. She has never used smokeless tobacco. She reports that she does not drink alcohol and does not use drugs.    Tobacco:  She has never smoked tobacco.    Review of Systems   Constitutional:  Negative for chills, fatigue and fever.   HENT: Negative.  Negative for facial swelling and sore throat.    Eyes:  Negative for pain (left eye improved presssure), discharge and redness.   Respiratory:  Negative for apnea, cough, shortness of breath and wheezing.    Cardiovascular:  Negative for chest pain, palpitations and leg swelling.   Gastrointestinal: Negative.  Negative for abdominal pain, constipation, diarrhea, nausea and vomiting.   Genitourinary: Negative.  Negative for dysuria and frequency.   Musculoskeletal:  Positive for neck stiffness. Negative for arthralgias and neck pain.   Skin: Negative.  Negative for pallor.   Neurological: Negative.  Negative for dizziness, tremors, speech difficulty, weakness, light-headedness and headaches.        Tingling sensation in the right hand and fingers only in the morning after sleeping, goes away in couple hours and does not bother her throughout the day or night .  Per patient Symptoms improved  already ,significantly.   Psychiatric/Behavioral: Negative.  Negative for confusion. The patient is not nervous/anxious.        Objective:   Physical Exam  Skin:            Comments: A bruise in the lower right forearm area  from  the needle puncture from the chemotherapy  No lumps mass no tenderness   No erythema or localized tenderness.   Neurological:      Cranial Nerves: No cranial nerve deficit or dysarthria.      Sensory: Sensation is intact.      Motor: Tremor and abnormal muscle tone present. No weakness or atrophy.      Comments: Left eye  vision decreased        General Appearance:  Alert, cooperative, no distress, appears stated age   Head:  Normocephalic, without obvious abnormality, atraumatic   Eyes:  PERRL, conjunctiva/corneas clear,   eyes left eye -improved pressure feeling    Ears:  Normal TM's and external ear canals, both ears   Nose: Nares normal, no sinus tenderness   Throat: Lips, mucosa, and tongue normal;  gums normal   Neck: Supple, rom decreased . symmetrical, trachea midline, no adenopathy;  thyroid: not enlarged, symmetric, tenderness/mass/nodules; no carotid bruit or JVD   Back:   Symmetric, no curvature, ROM good no CVA tenderness   Lungs:   Clear to auscultation bilaterally, respirations unlabored   Heart:  irregular rate and rhythm, S1 and S2 normal, no murmur, rub, or gallop   Abdomen:   Soft, non-tender, bowel sounds active all four quadrants,  no masses, no organomegaly    Pelvic:    Extremities: Extremities normal, atraumatic, no cyanosis or edema   Pulses: 2+ and symmetric   Skin: Skin color, texture, turgor normal, no rashes or lesions   Lymph nodes: Cervical nodes normal   Neurologic: Normal , no motor  weakness        /56 (BP Location: Left arm, Patient Position: Sitting, Cuff Size: adult)   Pulse (!) 35   Ht 4' 8\" (1.422 m)   Wt 107 lb (48.5 kg)   SpO2 99%   BMI 23.99 kg/m²  Estimated body mass index is 23.99 kg/m² as calculated from the following:    Height as of this encounter: 4' 8\" (1.422 m).    Weight as of this encounter: 107 lb (48.5 kg).      1. Numbness and tingling in right hand  Only in the morning after sleeping on the right side that shoulder and arm-goes away in couple hours does not bother her throughout the day   recommend  Avoid sleeping on that side sleeps on her back in the good Position of the neck  Avoid keeping neck hyperextended or flext  Good posture  Avoid lifting  Likely musculoskeletal-positional with her neck  Recommend x-ray of the cervical spine    - XR CERVICAL SPINE (2-3 VIEWS) (CPT=72040); Future      Needle puncture bruising from the chemotherapy  needle puncture   1 week ago -no swelling of arm hand or  erythema  There is bruise -but no unusual lumps mass- it is   Improving  per pt significantly  Recommend patient to keep with warm compresses throughout the day 10 to 15 minutes 3 times daily  Patient states it is much better controlled patient is also  On Xarelto and aspirin  Stable cpm        Vitamin B12 deficiency  Recommend to complete the blood test  - Vitamin B12; Future    Nasal cavity,sinus  carcinoma   On  chemo x 3 -   3 weeks apart   For 12 weeks  Will have MRI    and f/[facility] Dr Rivas at [facility]   Stable cpm      Essential hypertension (Primary)  Stable   Take high blood pressure medication as perscribed   Keep a Low- sodium diet   Maintain walking and activity - as tolerated   Keep a log of blood pressure and heart rate at home   Patient verbalizes understanding and compliance  Metoprolol 50 mg 2  tab  bid   Losartan 100 mg every day   amlodipine 5   mg  CPM  Stable cpm   Monitor bp at home   Call if any concerns pr sy or elevated bp  Continue present management       2. Atrial fibrillation, unspecified type (HCC)  CPM - xarelto 15 mg every day   Patient follows with cardiology Dr Juan Stiles   On BB stable cpm         The patient and her daughter Viry indicates understanding of these issues and agrees to the plan.  Continue with current treatment plan.  Reinforced healthy diet, lifestyle, and exercise.      No follow-ups on file.       Supplementary Documentation:   General Health:          Carmen Cox's SCREENING SCHEDULE   Tests on this list are recommended by your physician but may not be covered, or covered at this frequency, by your insurer.   Please check with your insurance carrier before scheduling to verify coverage.   PREVENTATIVE SERVICES FREQUENCY &  COVERAGE DETAILS LAST COMPLETION DATE   Diabetes Screening    Fasting Blood Sugar /  Glucose    One screening every 12 months if never tested or if previously tested but not diagnosed with pre-diabetes   One screening every 6 months if diagnosed with pre-diabetes Lab Results   Component Value Date    GLU 84  11/17/2023        Cardiovascular Disease Screening    Lipid Panel  Cholesterol  Lipoprotein (HDL)  Triglycerides Covered every 5 years for all Medicare beneficiaries without apparent signs or symptoms of cardiovascular disease Lab Results   Component Value Date    CHOLEST 160 04/21/2023    HDL 59 04/21/2023    LDL 87 04/21/2023    TRIG 71 04/21/2023         Electrocardiogram (EKG)   Covered if needed at Welcome to Medicare, and non-screening if indicated for medical reasons 11/16/2023      Ultrasound Screening for Abdominal Aortic Aneurysm (AAA) Covered once in a lifetime for one of the following risk factors    Men who are 65-75 years old and have ever smoked    Anyone with a family history -     Colorectal Cancer Screening  Covered for ages 50-85; only need ONE of the following:    Colonoscopy   Covered every 10 years    Covered every 2 years if patient is at high risk or previous colonoscopy was abnormal -    No recommendations at this time    Flexible Sigmoidoscopy   Covered every 4 years -    Fecal Occult Blood Test Covered annually -   Bone Density Screening    Bone density screening    Covered every 2 years after age 65 if diagnosed with risk of osteoporosis or estrogen deficiency.    Covered yearly for long-term glucocorticoid medication use (Steroids) Last Dexa Scan:    XR DEXA BONE DENSITOMETRY (CPT=77080) 04/11/2023      No recommendations at this time   Pap and Pelvic    Pap   Covered every 2 years for women at normal risk; Annually if at high risk -  No recommendations at this time    Chlamydia Annually if high risk -  No recommendations at this time   Screening Mammogram    Mammogram     Recommend annually for all female patients aged 40 and older    One baseline mammogram covered for patients aged 35-39 -    No recommendations at this time    Immunizations    Influenza Covered once per flu season  Please get every year 11/20/2023  No recommendations at this time    Pneumococcal Each vaccine ([id number]  & [id number]) covered once after 65 Prevnar 13: 07/26/2018    [id number]: -     No recommendations at this time    Hepatitis B One screening covered for patients with certain risk factors   -  No recommendations at this time    Tetanus Toxoid Not covered by Medicare Part B unless medically necessary (cut with metal); may be covered with your pharmacy prescription benefits -    Tetanus, Diptheria and Pertusis TD and TDaP Not covered by Medicare Part B -  No recommendations at this time    Zoster Not covered by Medicare Part B; may be covered with your pharmacy  prescription benefits -  No recommendations at this time     Annual Monitoring of Persistent Medications (ACE/ARB, digoxin diuretics, anticonvulsants)    Potassium Annually Lab Results   Component Value Date    K 3.8 11/17/2023         Creatinine   Annually Lab Results   Component Value Date    CREATSERUM 0.82 11/17/2023         BUN Annually Lab Results   Component Value Date    BUN 10 11/17/2023       Drug Serum Conc Annually No results found for: \"DIGOXIN\", \"DIG\", \"VALP\"

## 2024-04-01 NOTE — TELEPHONE ENCOUNTER
See Acute Encounter.     Future Appointments   Date Time Provider Department Center   4/1/2024  2:40 PM Subhash Lewis MD [facility]   4/15/2024  1:40 PM Subhash Lewis MD [facility]

## 2024-04-03 ENCOUNTER — PATIENT MESSAGE (OUTPATIENT)
Dept: OPHTHALMOLOGY | Facility: CLINIC | Age: 84
End: 2024-04-03

## 2024-04-03 ENCOUNTER — LAB ENCOUNTER (OUTPATIENT)
Dept: LAB | Age: 84
End: 2024-04-03
Attending: INTERNAL MEDICINE
Payer: MEDICARE

## 2024-04-03 DIAGNOSIS — R20.2 NUMBNESS AND TINGLING IN RIGHT HAND: ICD-10-CM

## 2024-04-03 DIAGNOSIS — R20.0 NUMBNESS AND TINGLING IN RIGHT HAND: ICD-10-CM

## 2024-04-03 LAB
MAGNESIUM SERPL-MCNC: 1.9 MG/DL (ref 1.6–2.6)
VIT B12 SERPL-MCNC: 365 PG/ML (ref 211–911)

## 2024-04-03 PROCEDURE — 83735 ASSAY OF MAGNESIUM: CPT

## 2024-04-03 PROCEDURE — 36415 COLL VENOUS BLD VENIPUNCTURE: CPT

## 2024-04-03 PROCEDURE — 82607 VITAMIN B-12: CPT

## 2024-04-03 NOTE — TELEPHONE ENCOUNTER
From: Carmen Cox  To: Ching Chung  Sent: 4/3/2024 1:58 PM CDT  Subject: Left eye cancer improving!     Petra Chung,    My mom, Carmen Cox, is improving in her left eye vision due to the positive chemo treatment outcome! It was because of your expertise and ordering an orbital MRI!     The eye is actually looking forward and the opening larger and the vision is improving as of the recent MRI results on 2/22/24.     Due to this improvement, we would like to schedule her for an eye exam as soon as possible because the current prism glasses need to be updated. She is not seeing well with the current prescription.     Since your appointments are so far booked out, are you able to see my mom sooner?     Thank you so much!  Viry LyonsCarmen’s daughter

## 2024-04-03 NOTE — TELEPHONE ENCOUNTER
Called patients daughter Viry.  She wants her mom to have an updated prescription for glasses because she cannot see with her current glasses since eye surgery.  Scheduled her for this Friday at 10:45 in the morning.

## 2024-04-03 NOTE — TELEPHONE ENCOUNTER
From: Carmen Cox  To: Ching Chung  Sent: 4/3/2024 2:00 PM CDT  Subject: Left eye cancer improving     Hello Dr. Chung,     My mom, Carmen Cox, is improving in her left eye vision due to the positive chemo treatment outcome! It was because of your expertise and ordering an orbital MRI!      The eye is actually looking forward and the opening larger and the vision is improving as of the recent MRI results on 2/22/24.      Due to this improvement, we would like to schedule her for an eye exam as soon as possible because the current prism glasses need to be updated. She is not seeing well with the current prescription.      Since your appointments are so far booked out, are you able to see my mom sooner?      Thank you so much!  Viry Lyons Carmen’s daughter

## 2024-04-05 ENCOUNTER — OFFICE VISIT (OUTPATIENT)
Dept: OPHTHALMOLOGY | Facility: CLINIC | Age: 84
End: 2024-04-05

## 2024-04-05 DIAGNOSIS — H01.02B SQUAMOUS BLEPHARITIS OF UPPER AND LOWER EYELIDS OF BOTH EYES: ICD-10-CM

## 2024-04-05 DIAGNOSIS — H49.12 LEFT SUPERIOR OBLIQUE PALSY: ICD-10-CM

## 2024-04-05 DIAGNOSIS — H25.13 AGE-RELATED NUCLEAR CATARACT OF BOTH EYES: ICD-10-CM

## 2024-04-05 DIAGNOSIS — H01.02A SQUAMOUS BLEPHARITIS OF UPPER AND LOWER EYELIDS OF BOTH EYES: ICD-10-CM

## 2024-04-05 DIAGNOSIS — H52.203 HYPEROPIA OF BOTH EYES WITH ASTIGMATISM: ICD-10-CM

## 2024-04-05 DIAGNOSIS — H52.03 HYPEROPIA OF BOTH EYES WITH ASTIGMATISM: ICD-10-CM

## 2024-04-05 DIAGNOSIS — C31.9 CANCER OF NASAL CAVITY AND SINUS (HCC): Primary | ICD-10-CM

## 2024-04-05 DIAGNOSIS — C30.0 CANCER OF NASAL CAVITY AND SINUS (HCC): Primary | ICD-10-CM

## 2024-04-05 NOTE — ASSESSMENT & PLAN NOTE
2 base down fresnel prism applied to left lens of glasses.   Patient says the prism is much clearer.   Patient would like glasses RX given with updated prism.

## 2024-04-05 NOTE — PATIENT INSTRUCTIONS
Left superior oblique palsy  2 base down fresnel prism applied to left lens of glasses.   Patient says the prism is much clearer.   Patient would like glasses RX given with updated prism.     Squamous blepharitis of upper and lower eyelids of both eyes   Patient instructed to use eyelid hygiene daily. For baby shampoo eyelid hygiene:   apply baby shampoo to warm washcloth and gently scrub eyelids with eyes closed for 1 minute.   Then rinse thoroughly  with lukewarm water making sure all baby shampoo residue is gone.  Alternately, you can use Ocusoft Lid scrubs (which you can purchase over the counter)  twice a day as directed.    Use over the counter preservative free tears as needed for dry feeling.     Hyperopia of both eyes with astigmatism  New glasses with ground in prism for the Left Superior Oblique palsy.   9PD total,split RE 4 BU and LE 5 BD.  Also 2 additional PD Fresnel was placed  Base Down on Left lens of glasses. Patient liked the better singe vision.     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   Under the care of Dr. Palmer, oncologist at [facility], receiving Chemotherapy, Pembrolizumab. Left eye movement has improved since the chemo.    Age-related nuclear cataract of both eyes  Mild, no treatment.

## 2024-04-05 NOTE — ASSESSMENT & PLAN NOTE
Patient instructed to use eyelid hygiene daily. For baby shampoo eyelid hygiene:   apply baby shampoo to warm washcloth and gently scrub eyelids with eyes closed for 1 minute.   Then rinse thoroughly  with lukewarm water making sure all baby shampoo residue is gone.  Alternately, you can use Ocusoft Lid scrubs (which you can purchase over the counter)  twice a day as directed.    Use over the counter preservative free tears as needed for dry feeling.

## 2024-04-06 NOTE — ASSESSMENT & PLAN NOTE
New glasses with ground in prism for the Left Superior Oblique palsy.   9PD total,split RE 4 BU and LE 5 BD.  Also 2 additional PD Fresnel was placed  Base Down on Left lens of glasses. Patient liked the better singe vision.

## 2024-04-06 NOTE — ASSESSMENT & PLAN NOTE
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   Under the care of Dr. Palmer, oncologist at [facility] [place] [facility], receiving Chemotherapy, Pembrolizumab. Left eye movement has improved since the chemo.

## 2024-04-06 NOTE — PROGRESS NOTES
Carmen Cox is a 84 year old female.    HPI:     HPI    EP/ 85 yo F here for follow up.   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  She is wearing ground in prism in her current glasses.  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   She is being treated with chemotherapy every 3 weeks at [facility] with Dr. Palmer.  She was had another MRI on 2/21/24 which showed a decrease in the cancer.  Patient's daughter says that mom's eye is improving since the chemo. The eye is more straight ahead now and the current glasses are not working for her.   Patient says she is occasionally seeing double.  (Vertical)   Last edited by Ching Chung MD on 4/6/2024  6:26 PM.        Patient History:  Past Medical History:   Diagnosis Date    Afib (HCC)     Blurry vision, left eye 09/30/2021    Cancer (HCC)     Decreased GFR 08/12/2019    Essential hypertension     Heart palpitations 09/30/2021    Hypertension     Lipid screening 06/25/2014    Malaise and fatigue 03/24/2017    Onychia of toe of left foot 2009    Left great toe/Depbridement of at least 2/3 toenail    Skin lesions 06/21/2021    Syncope and collapse 09/30/2021       Surgical History: Carmen Cox has a past surgical history that includes appendectomy.    Family History   Problem Relation Age of Onset    Glaucoma Neg     Macular degeneration Neg     Diabetes Neg        Social History:   Social History     Socioeconomic History    Marital status:    Tobacco Use    Smoking status: Never     Passive exposure: Never    Smokeless tobacco: Never   Vaping Use    Vaping Use: Never used   Substance and Sexual Activity    Alcohol use: No     Alcohol/week: 0.0 standard drinks of alcohol    Drug use: No   Other Topics Concern     Caffeine Concern Yes     Comment: 1 cup coffee daily    Pt has a pacemaker No    Pt has a defibrillator No    Breast feeding No    Reaction to local anesthetic No     Social Determinants of Health     Food Insecurity: No Food Insecurity (11/15/2023)    Food Insecurity     Food Insecurity: Never true   Transportation Needs: No Transportation Needs (11/15/2023)    Transportation Needs     Lack of Transportation: No   Housing Stability: Low Risk  (11/15/2023)    Housing Stability     Housing Instability: No       Medications:  Current Outpatient Medications   Medication Sig Dispense Refill    Cyanocobalamin (VITAMIN B-12) 2500 MCG Sublingual SL Tab Take 1  Tab under the tongue daily 90 tablet 01    gabapentin 300 MG Oral Cap 1 pill  capsule 3    amLODIPine 5 MG Oral Tab Take 1 tablet (5 mg total) by mouth daily. 90 tablet 0    polypropylene glycol- 0.4-0.3 % Ophthalmic Solution Place 1 drop into both eyes as needed (Dry eyes).      metoprolol tartrate 50 MG Oral Tab Take 2 tablet  every morning and 2 tablets in the evening for 2 weeks.      losartan 100 MG Oral Tab Take 1 tablet (100 mg total) by mouth daily. 90 tablet 3    rivaroxaban 15 MG Oral Tab Take 1 tablet (15 mg total) by mouth daily with food. 30 tablet 0    acetaminophen 500 MG Oral Tab Take 1 tablet (500 mg total) by mouth every 6 (six) hours as needed for Fever (equal to or greater than 100.4). 1 tablet 0    famotidine 20 MG Oral Tab Take 1 tablet (20 mg total) by mouth 2 (two) times daily before meals. 180 tablet 3    MELATONIN OR Take 1 tablet by mouth 5 days out of the week.  No weekends      Omega-3 Fatty Acids (FISH OIL OR) Take 1 teaspoon by mouth daily      Ascorbic Acid (VITAMIN C) 100 MG Oral Tab Take 1 tablet (100 mg total) by mouth daily.      Multiple Vitamins-Minerals (MULTIVITAMIN ADULTS 50+) Oral Tab Take 1 tablet by mouth daily.      Cholecalciferol (VITAMIN D3) 25 MCG (1000 UT) Oral Cap Take 1 tablet by mouth daily.       aspirin 81 MG Oral Tab Take 1 tablet (81 mg total) by mouth daily.         Allergies:  No Known Allergies    ROS:       PHYSICAL EXAM:     Base Eye Exam       Visual Acuity (Snellen - Linear)         Right Left    Dist cc 20/30 20/30              Tonometry (Applanation, 12:00 PM)         Right Left    Pressure 15 13              Pupils         Pupils APD    Right PERRL     Left PERRL +1   + APD OS              Extraocular Movement         Right Left     Full Full                  Strabismus Exam       Method: Cover-uncover with prism glasses on    Correction: cc      Distance Near Near +3DS N Bifocals    LHT 1       LHT 2 X 8     CN 3 has resolved but Left Hypertropia due to LSO palsy persists.   Patient Likes 2 more prisms Base down LE  Applied 2 fresnel prism BD over Left lens of current glasses which have 7PD, split RE 4 BU and LE 3 BD. Sees single with 2 PD extra prism.  Also gave patient new Rx with additional ground in prism for a total of 9PD split RE 4 BU and LE 5 BD. Patient would like to have new glasses.       Slit Lamp and Fundus Exam       External Exam         Right Left    External Normal- Normal-              Slit Lamp Exam         Right Left    Lids/Lashes Blepharitis, no ptosis Blepharitis, no ptosis    Conjunctiva/Sclera Temp pinguecula Temp pinguecula    Cornea Clear Clear    Anterior Chamber Deep and quiet Deep and quiet    Iris Normal Normal    Lens Nuclear sclerosis Nuclear sclerosis    Vitreous Clear Clear              Fundus Exam         Right Left    Disc Normal Normal    C/D Ratio 0.1 0.1    Macula Normal Normal    Undilated.                  Refraction       Wearing Rx         Sphere Cylinder Axis Add Horz Prism    Right +1.25 +0.25 010 +3.00 4 up    Left +0.75 +0.50 155 +3.00 3 down      Type: Progressive bifocal              Manifest Refraction         Sphere Cylinder [place] Dist VA Add Near VA    Right +1.75 Sphere  20/30+ +3.00 20/20    Left +1.25 +0.25 150 20/25- +3.00 20/20               Final Rx         Sphere Cylinder [place] Dist VA Add Near VA Vert Prism    Right +1.75 Sphere  20/30+ +3.00 20/20 4.0 up    Left +1.25 +0.25 150 20/25- +3.00 20/20 5.0 down      Type: Progressive bifocal                     ASSESSMENT/PLAN:     Diagnoses and Plan:     Left superior oblique palsy  2 base down fresnel prism applied to left lens of glasses.   Patient says the prism is much clearer.   Patient would like glasses RX given with updated prism.     Squamous blepharitis of upper and lower eyelids of both eyes   Patient instructed to use eyelid hygiene daily. For baby shampoo eyelid hygiene:   apply baby shampoo to warm washcloth and gently scrub eyelids with eyes closed for 1 minute.   Then rinse thoroughly  with lukewarm water making sure all baby shampoo residue is gone.  Alternately, you can use Ocusoft Lid scrubs (which you can purchase over the counter)  twice a day as directed.    Use over the counter preservative free tears as needed for dry feeling.     Hyperopia of both eyes with astigmatism  New glasses with ground in prism for the Left Superior Oblique palsy.   9PD total,split RE 4 BU and LE 5 BD.  Also 2 additional PD Fresnel was placed  Base Down on Left lens of glasses. Patient liked the better singe vision.     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   Under the care of Dr. Palmer, oncologist at [facility], receiving Chemotherapy, Pembrolizumab. Left eye movement has improved since the chemo.    Age-related nuclear cataract of both eyes  Mild, no treatment.         No orders of the defined types were placed in this encounter.      Meds This Visit:  Requested Prescriptions      No prescriptions requested or ordered in this encounter        Follow up instructions:  Return in 2 months (on 6/5/2024), or if symptoms worsen or fail to improve.    4/6/2024  Scribed by: Ching Chung MD

## 2024-04-11 RX ORDER — AMLODIPINE BESYLATE 5 MG/1
5 TABLET ORAL DAILY
Qty: 90 TABLET | Refills: 3 | Status: SHIPPED | OUTPATIENT
Start: 2024-04-11

## 2024-04-11 NOTE — TELEPHONE ENCOUNTER
REFILL PASSED PER [facility] PROTOCOLS    Requested Prescriptions   Pending Prescriptions Disp Refills    AMLODIPINE 5 MG Oral Tab [Pharmacy Med Name: amLODIPine Besylate 5 MG Oral Tablet] 90 tablet 0     Sig: Take 1 tablet by mouth once daily       Hypertension Medications Protocol Passed - 4/9/2024  2:49 PM        Passed - CMP or BMP in past 12 months        Passed - Last BP reading less than 140/90     BP Readings from Last 1 Encounters:   04/01/24 122/56               Passed - In person appointment or virtual visit in the past 12 mos or appointment in next 3 mos     Recent Outpatient Visits              6 days ago 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 [facility]    [facility] Visit    1 week ago Numbness and tingling in right hand    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   Normal health check.   Review medication list.                    Passed - EGFRCR or GFRNAA > 50     GFR Evaluation  EGFRCR: 71 , resulted on 11/17/2023               Future Appointments         Provider Department Appt Notes    In 4 days Subhash Lewis MD [facility] last px 01/29/24  Blood pressure.   Normal health check.   Review medication list.           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

## 2024-04-15 ENCOUNTER — OFFICE VISIT (OUTPATIENT)
Dept: INTERNAL MEDICINE CLINIC | Facility: CLINIC | Age: 84
End: 2024-04-15
Payer: MEDICARE

## 2024-04-15 VITALS
DIASTOLIC BLOOD PRESSURE: 74 MMHG | WEIGHT: 108 LBS | HEIGHT: 56 IN | SYSTOLIC BLOOD PRESSURE: 140 MMHG | BODY MASS INDEX: 24.3 KG/M2 | HEART RATE: 74 BPM

## 2024-04-15 DIAGNOSIS — R20.0 NUMBNESS AND TINGLING IN RIGHT HAND: ICD-10-CM

## 2024-04-15 DIAGNOSIS — R20.2 NUMBNESS AND TINGLING IN RIGHT HAND: ICD-10-CM

## 2024-04-15 DIAGNOSIS — I10 ESSENTIAL HYPERTENSION WITH GOAL BLOOD PRESSURE LESS THAN 130/80: Primary | ICD-10-CM

## 2024-04-15 DIAGNOSIS — M79.641 RIGHT HAND PAIN: ICD-10-CM

## 2024-04-15 PROCEDURE — 3008F BODY MASS INDEX DOCD: CPT | Performed by: INTERNAL MEDICINE

## 2024-04-15 PROCEDURE — 1126F AMNT PAIN NOTED NONE PRSNT: CPT | Performed by: INTERNAL MEDICINE

## 2024-04-15 PROCEDURE — 99214 OFFICE O/P EST MOD 30 MIN: CPT | Performed by: INTERNAL MEDICINE

## 2024-04-15 PROCEDURE — 3078F DIAST BP <80 MM HG: CPT | Performed by: INTERNAL MEDICINE

## 2024-04-15 PROCEDURE — 3077F SYST BP >= 140 MM HG: CPT | Performed by: INTERNAL MEDICINE

## 2024-04-15 PROCEDURE — 1159F MED LIST DOCD IN RCRD: CPT | Performed by: INTERNAL MEDICINE

## 2024-04-15 PROCEDURE — 1160F RVW MEDS BY RX/DR IN RCRD: CPT | Performed by: INTERNAL MEDICINE

## 2024-04-15 NOTE — PROGRESS NOTES
You wake up at 1 PM this Subjective:   Carmen Cox is a 84 year old     Chief Complaint   Patient presents with    Hypertension    Numbness     Follow up on numbness in the right hand.  Stts that numbness is getting worse   Patient for evaluation right hand pain and finger tips numbness got better  for 1 week  ,but than since started working more at home pain started   , state was working a lot with her hand gardening and  all needed at home  work  and was hurting  at night     Patient denies other symptoms or concerns at this time .   denies chest pain, shortness of breath, dyspnea on exertion or heart palpitations also denies nausea, vomiting, diarrhea, constipation or abdominal pain. No fever, chills.    The rest of the review of systems, see below.     Allergies:  She has No Known Allergies.    Current Medications:  Outpatient Medications Marked as Taking for the 4/15/24 encounter (Office Visit) with Subhash Lewis MD   Medication Sig    amLODIPine 5 MG Oral Tab Take 1 tablet (5 mg total) by mouth daily.    Cyanocobalamin (VITAMIN B-12) 2500 MCG Sublingual SL Tab Take 1  Tab under the tongue daily    gabapentin 300 MG Oral Cap 1 pill TID    polypropylene glycol- 0.4-0.3 % Ophthalmic Solution Place 1 drop into both eyes as needed (Dry eyes).    metoprolol tartrate 50 MG Oral Tab Take 1 tablet  every morning and 1 tablet in the evening    losartan 100 MG Oral Tab Take 1 tablet (100 mg total) by mouth daily.    rivaroxaban 15 MG Oral Tab Take 1 tablet (15 mg total) by mouth daily with food.    acetaminophen 500 MG Oral Tab Take 1 tablet (500 mg total) by mouth every 6 (six) hours as needed for Fever (equal to or greater than 100.4).    famotidine 20 MG Oral Tab Take 1 tablet (20 mg total) by mouth 2 (two) times daily before meals.    MELATONIN OR Take 1 tablet by mouth 5 days out of the week.  No weekends    Omega-3 Fatty Acids (FISH OIL OR) Take 1 teaspoon by mouth daily    Ascorbic Acid (VITAMIN C)  100 MG Oral Tab Take 1 tablet (100 mg total) by mouth daily.    Multiple Vitamins-Minerals (MULTIVITAMIN ADULTS 50+) Oral Tab Take 1 tablet by mouth daily.    Cholecalciferol (VITAMIN D3) 25 MCG (1000 UT) Oral Cap Take 1 tablet by mouth daily.    aspirin 81 MG Oral Tab Take 1 tablet (81 mg total) by mouth daily.       Medical History:  She  has a past medical history of Afib (HCC), Blurry vision, left eye (09/30/2021), Cancer (HCC), Decreased GFR (08/12/2019), Essential hypertension, Heart palpitations (09/30/2021), Hypertension, Lipid screening (06/25/2014), Malaise and fatigue (03/24/2017), Onychia of toe of left foot (2009), Skin lesions (06/21/2021), and Syncope and collapse (09/30/2021).  Surgical History:  She  has a past surgical history that includes appendectomy.   Family History:  Her family history is not on file.  Social History:  She  reports that she has never smoked. She has never been exposed to tobacco smoke. She has never used smokeless tobacco. She reports that she does not drink alcohol and does not use drugs.    Tobacco:  She has never smoked tobacco.    Review of Systems   Constitutional:  Negative for chills, fatigue and fever.   HENT: Negative.  Negative for facial swelling and sore throat.    Eyes:  Negative for pain (left eye improved presssure), discharge and redness.   Respiratory:  Negative for apnea, cough, shortness of breath and wheezing.    Cardiovascular:  Negative for chest pain, palpitations and leg swelling.   Gastrointestinal: Negative.  Negative for abdominal pain, constipation, diarrhea, nausea and vomiting.   Genitourinary: Negative.  Negative for dysuria and frequency.   Musculoskeletal:  Positive for arthralgias (r hand pain  at night  finger tips burning -sometimes after pain  - only at night). Negative for back pain, joint swelling, neck pain and neck stiffness.   Skin: Negative.  Negative for color change and pallor.   Neurological: Negative.  Negative for dizziness,  tremors, speech difficulty, weakness, light-headedness and headaches.        Tingling sensation in the right hand and fingers only in the morning after sleeping, goes away in couple hours and does not bother her throughout the day or night .  Per patient Symptoms improved  already ,significantly.   Psychiatric/Behavioral: Negative.  Negative for confusion. The patient is not nervous/anxious.        Objective:   Physical Exam  Musculoskeletal:      Right hand: No swelling, deformity, lacerations, tenderness or bony tenderness. Normal range of motion. Normal strength. Normal sensation. There is no disruption of two-point discrimination. Normal capillary refill. Normal pulse.      Left hand: No swelling, deformity, lacerations, tenderness or bony tenderness. Normal range of motion. Normal strength. Normal sensation. There is no disruption of two-point discrimination. Normal capillary refill. Normal pulse.      Cervical back: No deformity, erythema, rigidity, spasms, tenderness or bony tenderness. No pain with movement. Normal range of motion.      Thoracic back: No deformity. Normal range of motion.      Comments: Negative carpal tunnel sighs      Skin:     Comments:      Neurological:      Cranial Nerves: No cranial nerve deficit or dysarthria.      Sensory: Sensation is intact.      Motor: No weakness, tremor, atrophy or abnormal muscle tone.      Comments: Left eye  vision decreased        General Appearance:  Alert, cooperative, no distress, appears stated age   Head:  Normocephalic, without obvious abnormality, atraumatic   Eyes:  PERRL, conjunctiva/corneas clear,  eyes left eye -improved pressure feeling    Ears:  Normal TM's and external ear canals, both ears   Nose: Nares normal, no sinus tenderness   Throat: Lips, mucosa, and tongue normal;  gums normal   Neck: Supple, rom decreased . symmetrical, trachea midline, no adenopathy;  thyroid: not enlarged, symmetric, tenderness/mass/nodules; no carotid bruit or  JVD   Back:   Symmetric, no curvature, ROM good no CVA tenderness   Lungs:   Clear to auscultation bilaterally, respirations unlabored   Heart:  irregular rate and rhythm, S1 and S2 normal, no murmur, rub, or gallop   Abdomen:   Soft, non-tender, bowel sounds active all four quadrants,  no masses, no organomegaly    Pelvic:    Extremities: Extremities normal, atraumatic, no cyanosis or edema   Pulses: 2+ and symmetric   Skin: Skin color, texture, turgor normal, no rashes or lesions   Lymph nodes: Cervical nodes normal   Neurologic: Normal , no motor  weakness        /74   Pulse 74   Ht 4' 8\" (1.422 m)   Wt 108 lb (49 kg)   BMI 24.21 kg/m²  Estimated body mass index is 24.21 kg/m² as calculated from the following:    Height as of this encounter: 4' 8\" (1.422 m).    Weight as of this encounter: 108 lb (49 kg).      1.  Right   hand pain   Tingling   Exam normal right hand /arm    Pulse RA normal  At night  liklely DJD   No skin  lesions of arm or swelling   Avoid lifting work in garden home   Work compress tid   Rest   Recommend x-ray  R hand and  of the cervical spine - not completed yet     - XR CERVICAL SPINE (2-3 VIEWS) (CPT=72040); Future  XR right hand   No lumps/ mass- skin lesions swelling  Likely djd   Recommend patient to keep with warm compresses throughout the day 10 to 15 minutes 3 times daily  Patient states it is much better controlled patient is also  On Xarelto and aspirin  Stable cpm        Vitamin B12 deficiency  - Vitamin B12; Future   vit b12 2500 mg every day     Nasal cavity,sinus  carcinoma   On  chemo x 3 -   3 weeks apart   For 12 weeks  Will have MRI    and f/[facility] Dr Rivas at [facility]   Stable cpm      Essential hypertension (Primary)  Stable   Take high blood pressure medication as perscribed   Keep a Low- sodium diet   Maintain walking and activity - as tolerated   Keep a log of blood pressure and heart rate at home   Patient verbalizes understanding and  compliance  Metoprolol 50 mg 1  tab  bid   Losartan 100 mg every day   amlodipine 5   mg every day   CPM  Stable cpm   Monitor bp at home   Call if any concerns pr sy or elevated bp  Continue present management        Atrial fibrillation, unspecified type (HCC)  CPM - xarelto 15 mg every day   Patient follows with cardiology Dr Juan Stiles   On BB stable cpm         The patient and her daughter Viry indicates understanding of these issues and agrees to the plan.  Continue with current treatment plan.  Reinforced healthy diet, lifestyle, and exercise.      No follow-ups on file.       Supplementary Documentation:   General Health:          Carmen Cox's SCREENING SCHEDULE   Tests on this list are recommended by your physician but may not be covered, or covered at this frequency, by your insurer.   Please check with your insurance carrier before scheduling to verify coverage.   PREVENTATIVE SERVICES FREQUENCY &  COVERAGE DETAILS LAST COMPLETION DATE   Diabetes Screening    Fasting Blood Sugar /  Glucose    One screening every 12 months if never tested or if previously tested but not diagnosed with pre-diabetes   One screening every 6 months if diagnosed with pre-diabetes Lab Results   Component Value Date    GLU 84 11/17/2023        Cardiovascular Disease Screening    Lipid Panel  Cholesterol  Lipoprotein (HDL)  Triglycerides Covered every 5 years for all Medicare beneficiaries without apparent signs or symptoms of cardiovascular disease Lab Results   Component Value Date    CHOLEST 160 04/21/2023    HDL 59 04/21/2023    LDL 87 04/21/2023    TRIG 71 04/21/2023         Electrocardiogram (EKG)   Covered if needed at Welcome to Medicare, and non-screening if indicated for medical reasons 11/16/2023      Ultrasound Screening for Abdominal Aortic Aneurysm (AAA) Covered once in a lifetime for one of the following risk factors    Men who are 65-75 years old and have ever smoked    Anyone with a family history -      Colorectal Cancer Screening  Covered for ages 50-85; only need ONE of the following:    Colonoscopy   Covered every 10 years    Covered every 2 years if patient is at high risk or previous colonoscopy was abnormal -    No recommendations at this time    Flexible Sigmoidoscopy   Covered every 4 years -    Fecal Occult Blood Test Covered annually -   Bone Density Screening    Bone density screening    Covered every 2 years after age 65 if diagnosed with risk of osteoporosis or estrogen deficiency.    Covered yearly for long-term glucocorticoid medication use (Steroids) Last Dexa Scan:    XR DEXA BONE DENSITOMETRY (CPT=77080) 04/11/2023      No recommendations at this time   Pap and Pelvic    Pap   Covered every 2 years for women at normal risk; Annually if at high risk -  No recommendations at this time    Chlamydia Annually if high risk -  No recommendations at this time   Screening Mammogram    Mammogram     Recommend annually for all female patients aged 40 and older    One baseline mammogram covered for patients aged 35-39 -    No recommendations at this time    Immunizations    Influenza Covered once per flu season  Please get every year 11/20/2023  No recommendations at this time    Pneumococcal Each vaccine ([id number] & [id number]) covered once after 65 Prevnar 13: 07/26/2018    [id number]: -     No recommendations at this time    Hepatitis B One screening covered for patients with certain risk factors   -  No recommendations at this time    Tetanus Toxoid Not covered by Medicare Part B unless medically necessary (cut with metal); may be covered with your pharmacy prescription benefits -    Tetanus, Diptheria and Pertusis TD and TDaP Not covered by Medicare Part B -  No recommendations at this time    Zoster Not covered by Medicare Part B; may be covered with your pharmacy  prescription benefits -  No recommendations at this time     Annual Monitoring of Persistent Medications (ACE/ARB, digoxin diuretics,  anticonvulsants)    Potassium Annually Lab Results   Component Value Date    K 3.8 11/17/2023         Creatinine   Annually Lab Results   Component Value Date    CREATSERUM 0.82 11/17/2023         BUN Annually Lab Results   Component Value Date    BUN 10 11/17/2023       Drug Serum Conc Annually No results found for: \"DIGOXIN\", \"DIG\", \"VALP\"

## 2024-04-16 ENCOUNTER — HOSPITAL ENCOUNTER (OUTPATIENT)
Dept: GENERAL RADIOLOGY | Facility: HOSPITAL | Age: 84
Discharge: HOME | End: 2024-04-16
Attending: INTERNAL MEDICINE
Payer: MEDICARE

## 2024-04-16 DIAGNOSIS — R20.2 NUMBNESS AND TINGLING IN RIGHT HAND: ICD-10-CM

## 2024-04-16 DIAGNOSIS — R20.0 NUMBNESS AND TINGLING IN RIGHT HAND: ICD-10-CM

## 2024-04-16 DIAGNOSIS — M54.2 NECK PAIN: ICD-10-CM

## 2024-04-16 DIAGNOSIS — M79.641 RIGHT HAND PAIN: ICD-10-CM

## 2024-04-16 PROCEDURE — 73130 X-RAY EXAM OF HAND: CPT | Performed by: INTERNAL MEDICINE

## 2024-04-16 PROCEDURE — 72040 X-RAY EXAM NECK SPINE 2-3 VW: CPT | Performed by: INTERNAL MEDICINE

## 2024-04-29 ENCOUNTER — MED REC SCAN ONLY (OUTPATIENT)
Dept: INTERNAL MEDICINE CLINIC | Facility: CLINIC | Age: 84
End: 2024-04-29

## 2024-06-13 ENCOUNTER — MED REC SCAN ONLY (OUTPATIENT)
Dept: INTERNAL MEDICINE CLINIC | Facility: CLINIC | Age: 84
End: 2024-06-13

## 2024-07-08 ENCOUNTER — OFFICE VISIT (OUTPATIENT)
Dept: INTERNAL MEDICINE CLINIC | Facility: CLINIC | Age: 84
End: 2024-07-08

## 2024-07-08 VITALS
SYSTOLIC BLOOD PRESSURE: 120 MMHG | DIASTOLIC BLOOD PRESSURE: 70 MMHG | WEIGHT: 102 LBS | HEIGHT: 56 IN | BODY MASS INDEX: 22.95 KG/M2 | HEART RATE: 57 BPM

## 2024-07-08 DIAGNOSIS — C31.9 CANCER OF NASAL CAVITY AND SINUS (HCC): ICD-10-CM

## 2024-07-08 DIAGNOSIS — R20.0 NUMBNESS AND TINGLING IN RIGHT HAND: ICD-10-CM

## 2024-07-08 DIAGNOSIS — I10 ESSENTIAL HYPERTENSION WITH GOAL BLOOD PRESSURE LESS THAN 130/80: Primary | ICD-10-CM

## 2024-07-08 DIAGNOSIS — R20.2 NUMBNESS AND TINGLING IN RIGHT HAND: ICD-10-CM

## 2024-07-08 DIAGNOSIS — E53.8 VITAMIN B12 DEFICIENCY: ICD-10-CM

## 2024-07-08 DIAGNOSIS — C30.0 CANCER OF NASAL CAVITY AND SINUS (HCC): ICD-10-CM

## 2024-07-08 DIAGNOSIS — I48.91 ATRIAL FIBRILLATION, UNSPECIFIED TYPE (HCC): ICD-10-CM

## 2024-07-08 PROCEDURE — G2211 COMPLEX E/M VISIT ADD ON: HCPCS | Performed by: INTERNAL MEDICINE

## 2024-07-08 PROCEDURE — 3078F DIAST BP <80 MM HG: CPT | Performed by: INTERNAL MEDICINE

## 2024-07-08 PROCEDURE — 1160F RVW MEDS BY RX/DR IN RCRD: CPT | Performed by: INTERNAL MEDICINE

## 2024-07-08 PROCEDURE — 1159F MED LIST DOCD IN RCRD: CPT | Performed by: INTERNAL MEDICINE

## 2024-07-08 PROCEDURE — 1126F AMNT PAIN NOTED NONE PRSNT: CPT | Performed by: INTERNAL MEDICINE

## 2024-07-08 PROCEDURE — 3074F SYST BP LT 130 MM HG: CPT | Performed by: INTERNAL MEDICINE

## 2024-07-08 PROCEDURE — 99214 OFFICE O/P EST MOD 30 MIN: CPT | Performed by: INTERNAL MEDICINE

## 2024-07-08 PROCEDURE — 3008F BODY MASS INDEX DOCD: CPT | Performed by: INTERNAL MEDICINE

## 2024-07-08 RX ORDER — CYANOCOBALAMIN (VITAMIN B-12) 2500 MCG
TABLET, SUBLINGUAL SUBLINGUAL
Qty: 90 TABLET | Refills: 2 | Status: SHIPPED | OUTPATIENT
Start: 2024-07-08

## 2024-07-08 NOTE — PROGRESS NOTES
You wake up at 1 PM this Subjective:   Carmen Cox is a 84 year old     Chief Complaint   Patient presents with    Diarrhea     C/o on/off diarrhea since June 12th.   Pt state  she is overall doing very well patient states she feels better now than she was feeling 10 years ago patient states she only has a concern about her diarrhea that is already much better since she started taking the fiber supplement and Imodium advised by the Dr. Palmer.   denies chest pain, shortness of breath, dyspnea on exertion or heart palpitations also denies nausea, vomiting, constipation or abdominal pain. No fever, chills.  Patient states she does have a similar pains in the left side of the head like she used to have  Taking gabapentin that helps her with that patient states that she only feels at night when she is laying down on the back but she turns on the side that she feels the pain is pretty much gone and she can fall asleep.      The rest of the review of systems, see below.     Allergies:  She has No Known Allergies.    Current Medications:  Outpatient Medications Marked as Taking for the 7/8/24 encounter (Office Visit) with Subhash Lewis MD   Medication Sig    Cyanocobalamin (VITAMIN B-12) 2500 MCG Sublingual SL Tab Take 1  Tab under the tongue daily    amLODIPine 5 MG Oral Tab Take 1 tablet (5 mg total) by mouth daily.    gabapentin 300 MG Oral Cap 1 pill TID    polypropylene glycol- 0.4-0.3 % Ophthalmic Solution Place 1 drop into both eyes as needed (Dry eyes).    metoprolol tartrate 50 MG Oral Tab Take 1 tablet (50 mg total) by mouth 2 (two) times daily.    losartan 100 MG Oral Tab Take 1 tablet (100 mg total) by mouth daily.    rivaroxaban 15 MG Oral Tab Take 1 tablet (15 mg total) by mouth daily with food.    acetaminophen 500 MG Oral Tab Take 1 tablet (500 mg total) by mouth every 6 (six) hours as needed for Fever (equal to or greater than 100.4).    famotidine 20 MG Oral Tab Take 1 tablet (20 mg total) by  mouth 2 (two) times daily before meals.    MELATONIN OR Take 1 tablet by mouth 5 days out of the week.  No weekends    Omega-3 Fatty Acids (FISH OIL OR) Take 1 teaspoon by mouth daily    Ascorbic Acid (VITAMIN C) 100 MG Oral Tab Take 1 tablet (100 mg total) by mouth daily.    Multiple Vitamins-Minerals (MULTIVITAMIN ADULTS 50+) Oral Tab Take 1 tablet by mouth daily.    Cholecalciferol (VITAMIN D3) 25 MCG (1000 UT) Oral Cap Take 1 tablet by mouth daily.    aspirin 81 MG Oral Tab Take 1 tablet (81 mg total) by mouth daily.       Medical History:  She  has a past medical history of Afib (HCC), Blurry vision, left eye (09/30/2021), Cancer (HCC), Decreased GFR (08/12/2019), Essential hypertension, Heart palpitations (09/30/2021), Hypertension, Lipid screening (06/25/2014), Malaise and fatigue (03/24/2017), Onychia of toe of left foot (2009), Skin lesions (06/21/2021), and Syncope and collapse (09/30/2021).  Surgical History:  She  has a past surgical history that includes appendectomy.   Family History:  Her family history is not on file.  Social History:  She  reports that she has never smoked. She has never been exposed to tobacco smoke. She has never used smokeless tobacco. She reports that she does not drink alcohol and does not use drugs.    Tobacco:  She has never smoked tobacco.    Review of Systems   Constitutional:  Negative for chills, fatigue and fever.   HENT: Negative.  Negative for facial swelling and sore throat.    Eyes:  Negative for pain (left eye improved presssure), discharge and redness.   Respiratory:  Negative for apnea, cough, shortness of breath and wheezing.    Cardiovascular:  Negative for chest pain, palpitations and leg swelling.   Gastrointestinal: Negative.  Negative for abdominal pain, constipation, diarrhea, nausea and vomiting.   Genitourinary: Negative.  Negative for dysuria and frequency.   Musculoskeletal:  Positive for arthralgias (r hand pain  at night  finger tips burning  -sometimes after pain  - only at night)-improved. Negative for back pain, joint swelling, neck pain and neck stiffness.   Skin: Negative.  Negative for color change and pallor.   Neurological: Negative.  Negative for dizziness, tremors, speech difficulty, weakness, light-headedness and headaches.        Tingling sensation in the right hand and fingers only in the morning after sleeping, goes away in couple hours and does not bother her throughout the day or night improved-  Per patient Symptoms improved  already ,significantly.   Psychiatric/Behavioral: Negative.  Negative for confusion. The patient is not nervous/anxious.        Objective:   Physical Exam  Musculoskeletal:      Right hand: No swelling, deformity, lacerations, tenderness or bony tenderness. Normal range of motion. Normal strength. Normal sensation. There is no disruption of two-point discrimination. Normal capillary refill. Normal pulse.      Left hand: No swelling, deformity, lacerations, tenderness or bony tenderness. Normal range of motion. Normal strength. Normal sensation. There is no disruption of two-point discrimination. Normal capillary refill. Normal pulse.      Cervical back: No deformity, erythema, rigidity, spasms, tenderness or bony tenderness. No pain with movement. Normal range of motion.      Thoracic back: No deformity. Normal range of motion.      Comments: Negative carpal tunnel sighs      Skin:     Comments:      Neurological:      Cranial Nerves: No cranial nerve deficit or dysarthria.      Sensory: Sensation is intact.      Motor: No weakness, tremor, atrophy or abnormal muscle tone.      Comments: Left eye  vision decreased        General Appearance:  Alert, cooperative, no distress, appears stated age   Head:  Normocephalic, without obvious abnormality, atraumatic                           Lungs:   Clear to auscultation bilaterally, respirations unlabored   Heart:  irregular rate and rhythm, S1 and S2 normal, no murmur, rub,  or gallop   Abdomen:   Soft, non-tender, bowel sounds active all four quadrants,  no masses, no organomegaly    Pelvic:    Extremities: Extremities normal, atraumatic, no cyanosis or edema   Pulses: 2+ and symmetric   Skin: Skin color, texture, turgor normal, no rashes or lesions   Lymph nodes: Cervical nodes normal   Neurologic: Normal , no motor  weakness        /70   Pulse 57   Ht 4' 8\" (1.422 m)   Wt 102 lb (46.3 kg)   BMI 22.87 kg/m²  Estimated body mass index is 22.87 kg/m² as calculated from the following:    Height as of this encounter: 4' 8\" (1.422 m).    Weight as of this encounter: 102 lb (46.3 kg).      1.     Essential hypertension (Primary)  Stable   Take high blood pressure medication as perscribed   Keep a Low- sodium diet   Maintain walking and activity - as tolerated   Keep a log of blood pressure and heart rate at home   Patient verbalizes understanding and compliance  Metoprolol 50 mg 1  tab  bid   Losartan 100 mg every day   amlodipine 5   mg every day   CPM  Stable cpm   Monitor bp at home   Call if any concerns pr sy or elevated bp  Continue present management        Atrial fibrillation, unspecified type (HCC)  CPM - xarelto 15 mg every day   Patient follows with cardiology Dr Juan Stiles   On BB stable cpm          Right   hand pain   Tingling improving   Exam normal right hand /arm   Improved   At night  liklely DJD   No skin  lesions of arm or swelling   Avoid lifting work in garden home   Work compress tid   Rest   Recommend patient to keep with warm compresses throughout the day 10 to 15 minutes 3 times daily  Patient states it is much better controlled patient is also  On Xarelto and aspirin   Stable cpm        Vitamin B12 deficiency  - Vitamin B12; Future   vit b12 2500 mg every day or  other  day       Nasal cavity,sinus  carcinoma   On  chemo x 3 -   3 weeks apart   For 12 weeks  Will have MRI  -  tumor is  shrinking  and   f/[facility] Dr Rivas at [facility]   Stable cpm       Hx diarrhea  stable  On fiber tablet Imodium as needed  Diet     The patient and her daughter Viry indicates understanding of these issues and agrees to the plan.  Continue with current treatment plan.  Reinforced healthy diet, lifestyle, and exercise.      Return in about 3 months (around 10/8/2024), or if symptoms worsen or fail to improve, for Follow up visit.       Supplementary Documentation:   General Health:          Carmen Cox's SCREENING SCHEDULE   Tests on this list are recommended by your physician but may not be covered, or covered at this frequency, by your insurer.   Please check with your insurance carrier before scheduling to verify coverage.   PREVENTATIVE SERVICES FREQUENCY &  COVERAGE DETAILS LAST COMPLETION DATE   Diabetes Screening    Fasting Blood Sugar /  Glucose    One screening every 12 months if never tested or if previously tested but not diagnosed with pre-diabetes   One screening every 6 months if diagnosed with pre-diabetes Lab Results   Component Value Date    GLU 84 11/17/2023        Cardiovascular Disease Screening    Lipid Panel  Cholesterol  Lipoprotein (HDL)  Triglycerides Covered every 5 years for all Medicare beneficiaries without apparent signs or symptoms of cardiovascular disease Lab Results   Component Value Date    CHOLEST 160 04/21/2023    HDL 59 04/21/2023    LDL 87 04/21/2023    TRIG 71 04/21/2023         Electrocardiogram (EKG)   Covered if needed at Welcome to Medicare, and non-screening if indicated for medical reasons 11/16/2023      Ultrasound Screening for Abdominal Aortic Aneurysm (AAA) Covered once in a lifetime for one of the following risk factors    Men who are 65-75 years old and have ever smoked    Anyone with a family history -     Colorectal Cancer Screening  Covered for ages 50-85; only need ONE of the following:    Colonoscopy   Covered every 10 years    Covered every 2 years if patient is at high risk or previous colonoscopy was abnormal -    No  recommendations at this time    Flexible Sigmoidoscopy   Covered every 4 years -    Fecal Occult Blood Test Covered annually -   Bone Density Screening    Bone density screening    Covered every 2 years after age 65 if diagnosed with risk of osteoporosis or estrogen deficiency.    Covered yearly for long-term glucocorticoid medication use (Steroids) Last Dexa Scan:    XR DEXA BONE DENSITOMETRY (CPT=77080) 04/11/2023      No recommendations at this time   Pap and Pelvic    Pap   Covered every 2 years for women at normal risk; Annually if at high risk -  No recommendations at this time    Chlamydia Annually if high risk -  No recommendations at this time   Screening Mammogram    Mammogram     Recommend annually for all female patients aged 40 and older    One baseline mammogram covered for patients aged 35-39 -    No recommendations at this time    Immunizations    Influenza Covered once per flu season  Please get every year 11/20/2023  No recommendations at this time    Pneumococcal Each vaccine ([id number] & [id number]) covered once after 65 Prevnar 13: 07/26/2018    [id number]: -     No recommendations at this time    Hepatitis B One screening covered for patients with certain risk factors   -  No recommendations at this time    Tetanus Toxoid Not covered by Medicare Part B unless medically necessary (cut with metal); may be covered with your pharmacy prescription benefits -    Tetanus, Diptheria and Pertusis TD and TDaP Not covered by Medicare Part B -  No recommendations at this time    Zoster Not covered by Medicare Part B; may be covered with your pharmacy  prescription benefits -  No recommendations at this time     Annual Monitoring of Persistent Medications (ACE/ARB, digoxin diuretics, anticonvulsants)    Potassium Annually Lab Results   Component Value Date    K 3.8 11/17/2023         Creatinine   Annually Lab Results   Component Value Date    CREATSERUM 0.82 11/17/2023         BUN Annually Lab Results    Component Value Date    BUN 10 11/17/2023       Drug Serum Conc Annually No results found for: \"DIGOXIN\", \"DIG\", \"VALP\"

## 2024-07-15 ENCOUNTER — PATIENT MESSAGE (OUTPATIENT)
Dept: INTERNAL MEDICINE CLINIC | Facility: CLINIC | Age: 84
End: 2024-07-15

## 2024-07-15 DIAGNOSIS — R19.7 DIARRHEA, UNSPECIFIED TYPE: Primary | ICD-10-CM

## 2024-07-16 NOTE — TELEPHONE ENCOUNTER
From: Carmen Cox  To: Subhash Lewis  Sent: 7/15/2024 12:15 PM CDT  Subject: Continual weight loss    Petra Lewis,    My mom continues to lose weight. Friday July 12, we had blood work and immunotherapy at [facility]. Her weight was 101. 1 pound less than when we saw you on Monday July 8.    She has been taking a fiber supplement and diarrhea pills at the suggestion of her oncologist. He also suggested if that doesn’t help, to see a gastroenterologist.    On Friday before the appointment, she said she had a normal bowel movement. But a few minutes later, two bouts of diarrhea.     She really wants to gain weight.     Should we get a referral for the gastroenterologist from you as our next step?     Thank you for all you do!   Carmen Baires’s daughter

## 2024-07-17 NOTE — TELEPHONE ENCOUNTER
Noted recommend patient if she has a diarrhea to have stool test need to  the container from the labs and provided with a sample for testing-orders placed in the system recommend patient to see gastroenterologist referral placed in the system.  Avoid dairy food, greasy food sugar in the diet  Too much coffee caffeinated drinks    Keep with brat diet- bread, potatoes  Toast, pasta apple , banana rice-..  avoid grease,   Keep with good water hydration

## 2024-07-20 RX ORDER — FAMOTIDINE 20 MG/1
20 TABLET, FILM COATED ORAL
Qty: 180 TABLET | Refills: 3 | Status: SHIPPED | OUTPATIENT
Start: 2024-07-20

## 2024-07-20 NOTE — TELEPHONE ENCOUNTER
Refill passed per [facility] protocol.    Requested Prescriptions   Pending Prescriptions Disp Refills    FAMOTIDINE 20 MG Oral Tab [Pharmacy Med Name: FAMOTIDINE 20 MG Tablet] 180 tablet 3     Sig: TAKE 1 TABLET TWICE DAILY BEFORE MEALS       Gastrointestional Medication Protocol Passed - 7/17/2024  1:23 PM        Passed - In person appointment or virtual visit in the past 12 mos or appointment in next 3 mos     Recent Outpatient Visits              1 week ago [facility] with [facility] 130[facility]    [facility]    3 months ago Essential hypertension with goal blood pressure less than 130/80    [facility]    [facility]    3 months ago 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)    [facility], Ching Thomas MD    Office Visit    3 months ago Numbness and tingling in right hand    [facility]    [facility]    5 months ago Supraorbital neuralgia    [facility], Thaddeus Olmos MD    Office Visit                         Recent Outpatient Visits              1 week ago Essential hypertension with goal blood pressure less than 130/80    [facility]    [facility]    3 months ago Essential hypertension with goal blood pressure less than 130/80    [facility]    [facility]    3 months ago 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

## 2024-08-14 ENCOUNTER — APPOINTMENT (OUTPATIENT)
Dept: LAB | Facility: HOSPITAL | Age: 84
End: 2024-08-14
Attending: INTERNAL MEDICINE
Payer: MEDICARE

## 2024-08-14 PROCEDURE — 87427 SHIGA-LIKE TOXIN AG IA: CPT

## 2024-08-14 PROCEDURE — 87493 C DIFF AMPLIFIED PROBE: CPT

## 2024-08-14 PROCEDURE — 87046 STOOL CULTR AEROBIC BACT EA: CPT

## 2024-08-14 PROCEDURE — 87272 CRYPTOSPORIDIUM AG IF: CPT

## 2024-08-14 PROCEDURE — 87077 CULTURE AEROBIC IDENTIFY: CPT

## 2024-08-14 PROCEDURE — 87329 GIARDIA AG IA: CPT

## 2024-08-14 PROCEDURE — 87045 FECES CULTURE AEROBIC BACT: CPT

## 2024-08-14 PROCEDURE — 87015 SPECIMEN INFECT AGNT CONCNTJ: CPT

## 2024-08-15 ENCOUNTER — LAB ENCOUNTER (OUTPATIENT)
Dept: LAB | Age: 84
End: 2024-08-15
Attending: INTERNAL MEDICINE
Payer: MEDICARE

## 2024-08-15 DIAGNOSIS — R19.7 DIARRHEA, UNSPECIFIED TYPE: ICD-10-CM

## 2024-08-15 LAB
CRYPTOSP AG STL QL IA: NEGATIVE
G LAMBLIA AG STL QL IA: NEGATIVE

## 2024-08-16 LAB — C DIFF TOX B STL QL: NEGATIVE

## 2024-08-26 ENCOUNTER — MED REC SCAN ONLY (OUTPATIENT)
Dept: INTERNAL MEDICINE CLINIC | Facility: CLINIC | Age: 84
End: 2024-08-26

## 2024-10-24 ENCOUNTER — OFFICE VISIT (OUTPATIENT)
Facility: CLINIC | Age: 84
End: 2024-10-24

## 2024-10-24 VITALS
SYSTOLIC BLOOD PRESSURE: 169 MMHG | DIASTOLIC BLOOD PRESSURE: 82 MMHG | HEIGHT: 56 IN | WEIGHT: 104.13 LBS | BODY MASS INDEX: 23.42 KG/M2 | HEART RATE: 97 BPM

## 2024-10-24 DIAGNOSIS — K52.9 CHRONIC DIARRHEA: Primary | ICD-10-CM

## 2024-10-24 DIAGNOSIS — R10.32 LLQ PAIN: ICD-10-CM

## 2024-10-24 PROCEDURE — 99214 OFFICE O/P EST MOD 30 MIN: CPT | Performed by: NURSE PRACTITIONER

## 2024-10-24 PROCEDURE — 3008F BODY MASS INDEX DOCD: CPT | Performed by: NURSE PRACTITIONER

## 2024-10-24 PROCEDURE — 1159F MED LIST DOCD IN RCRD: CPT | Performed by: NURSE PRACTITIONER

## 2024-10-24 PROCEDURE — 3077F SYST BP >= 140 MM HG: CPT | Performed by: NURSE PRACTITIONER

## 2024-10-24 PROCEDURE — 1160F RVW MEDS BY RX/DR IN RCRD: CPT | Performed by: NURSE PRACTITIONER

## 2024-10-24 PROCEDURE — 3079F DIAST BP 80-89 MM HG: CPT | Performed by: NURSE PRACTITIONER

## 2024-10-24 NOTE — H&P
[facility] - Gastroenterology                                                                                                               Reason for consult: diarrhea    Requesting physician or provider: Subhash Lewis MD    Chief Complaint   Patient presents with    Consult     For diarrhea ,left upper abdominal pain       HPI:   Carmen Cox is a 84 year old year-old female with medical history of a-fib, sepsis, hypertension. Currently under immunotherapy treatment for nasal/sinus cancer.     She is here today for evaluation of diarrhea for 3 months. Has intermittent diarrhea. Seems to come and go, lasts for about a week. States she has mulitple episodes of liquid stool all day. Is not affected by type of food, cooks almost all meals at home.  Has tried Immodium and Metamucil, but patient feels like it was not effective.  Has been on Keytruda infusions for a year, oncology does not think it is the cause of her diarrhea. Has lost about 20 lbs in the past 2 years. Is feeling frustrated because she is trying to gain weight back. Patient and daughter both agree that she can be anxious and may effect symptoms.  Negative infectious stool studies August 2024.   Pancreatic elastase orered by oncology unremarkable June 2024.       Last colonoscopy: 2012?  Last EGD: none    NSAIDS: none  Tobacco: none  Alcohol: none  Marijuana: none  Illicit drugs: none    FH GI malignancy: father   FH IBD: no    No history of adverse reaction to sedation  No YOUSIF  Apixaban for a-fib   No pacemaker/defibrillator    Wt Readings from Last 6 Encounters:   10/24/24 104 lb 1.6 oz (47.2 kg)   07/08/24 102 lb (46.3 kg)   04/15/24 108 lb (49 kg)   04/01/24 107 lb (48.5 kg)   01/29/24 110 lb (49.9 kg)   11/20/23 107 lb (48.5 kg)        History, Medications, Allergies, ROS:      Past Medical History:    Afib (HCC)    Blurry vision, left eye    Cancer  (HCC)    Decreased GFR    Essential hypertension    Heart palpitations    Hypertension    Lipid screening    Malaise and fatigue    Onychia of toe of left foot    Left great toe/Depbridement of at least 2/3 toenail    Skin lesions    Syncope and collapse      Past Surgical History:   Procedure Laterality Date    Appendectomy        Family Hx:   Family History   Problem Relation Age of Onset    Glaucoma Neg     Macular degeneration Neg     Diabetes Neg       Social History:   Social History     Socioeconomic History    Marital status:    Tobacco Use    Smoking status: Never     Passive exposure: Never    Smokeless tobacco: Never   Vaping Use    Vaping status: Never Used   Substance and Sexual Activity    Alcohol use: No     Alcohol/week: 0.0 standard drinks of alcohol    Drug use: No   Other Topics Concern    Caffeine Concern Yes     Comment: 1 cup coffee daily    Pt has a pacemaker No    Pt has a defibrillator No    Breast feeding No    Reaction to local anesthetic No     Social Drivers of Health     Financial Resource Strain: Low Risk  (10/4/2021)    [facility]    Overall Financial Resource Strain (CARDIA)     Difficulty of Paying Living Expenses: Not very hard   Food Insecurity: No Food Insecurity (11/15/2023)    Food Insecurity     Food Insecurity: Never true   Transportation Needs: No Transportation Needs (11/15/2023)    Transportation Needs     Lack of Transportation: No   Housing Stability: Low Risk  (11/15/2023)    Housing Stability     Housing Instability: No        Medications (Active prior to today's visit):  Current Outpatient Medications   Medication Sig Dispense Refill    famotidine 20 MG Oral Tab Take 1 tablet (20 mg total) by mouth 2 (two) times daily before meals. 180 tablet 3    Cyanocobalamin (VITAMIN B-12) 2500 MCG Sublingual SL Tab Take 1  Tab under the tongue daily 90 tablet 2    amLODIPine 5 MG Oral Tab Take 1 tablet (5 mg total) by mouth  daily. 90 tablet 3    gabapentin 300 MG Oral Cap 1 pill  capsule 3    polypropylene glycol- 0.4-0.3 % Ophthalmic Solution Place 1 drop into both eyes as needed (Dry eyes).      metoprolol tartrate 50 MG Oral Tab Take 1 tablet (50 mg total) by mouth 2 (two) times daily.      losartan 100 MG Oral Tab Take 1 tablet (100 mg total) by mouth daily. 90 tablet 3    rivaroxaban 15 MG Oral Tab Take 1 tablet (15 mg total) by mouth daily with food. 30 tablet 0    acetaminophen 500 MG Oral Tab Take 1 tablet (500 mg total) by mouth every 6 (six) hours as needed for Fever (equal to or greater than 100.4). 1 tablet 0    MELATONIN OR Take 1 tablet by mouth 5 days out of the week.  No weekends      Omega-3 Fatty Acids (FISH OIL OR) Take 1 teaspoon by mouth daily      Ascorbic Acid (VITAMIN C) 100 MG Oral Tab Take 1 tablet (100 mg total) by mouth daily.      Multiple Vitamins-Minerals (MULTIVITAMIN ADULTS 50+) Oral Tab Take 1 tablet by mouth daily.      Cholecalciferol (VITAMIN D3) 25 MCG (1000 UT) Oral Cap Take 1 tablet by mouth daily.      aspirin 81 MG Oral Tab Take 1 tablet (81 mg total) by mouth daily.      Cyanocobalamin (VITAMIN B-12) 2500 MCG Sublingual SL Tab Take 1  Tab under the tongue daily (Patient not taking: Reported on 10/24/2024) 90 tablet 01       Allergies:  Allergies[1]    ROS:   CONSTITUTIONAL: negative for fevers, chills, sweats  EYES Negative for scleral icterus or redness, and diplopia  HEENT: Negative for hoarseness  RESPIRATORY: Negative for cough and severe shortness of breath  CARDIOVASCULAR: Negative for crushing sub-sternal chest pain  GASTROINTESTINAL: See HPI  GENITOURINARY: Negative for dysuria  MUSCULOSKELETAL: Negative for arthralgias and myalgias  SKIN: Negative for jaundice, rash or pruritus  NEUROLOGICAL: Negative for dizziness and headaches  BEHAVIOR/PSYCH: Negative for psychotic behavior    PHYSICAL EXAM:   Blood pressure (!) 174/89, pulse 97, height 4' 8\" (1.422 m), weight 104 lb  1.6 oz (47.2 kg).    GEN: Alert, no acute distress, well-nourished   HEENT: anicteric sclera, neck supple, trachea midline, MMM, no palpable or tender neck or supraclavicular lymph nodes  CV: RRR, the extremities are warm and well perfused   LUNGS: No increased work of breathing, CTAB  ABDOMEN: Soft, symmetrical, non-tender without distention or guarding. No scars or lesions. Aorta is without bruit or visible pulsation. Umbilicus is midline without herniation. Normoactive bowel sounds are present, No masses, hepatomegaly or splenomegaly noted.  MSK: No erythema, no warmth, no swelling of joints  SKIN: No jaundice, no erythema, no rashes, no lesions  HEMATOLOGIC: No bleeding, no bruising  NEURO: Alert and interactive, CARTER  PSYCH: appropriate mood & affect    Labs/Imaging/Procedures:     Patient's pertinent labs and imaging were reviewed and discussed with patient today.        .  ASSESSMENT/PLAN:   84 year old female presents for diarrhea.     1. Chronic diarrhea  - CT ABDOMEN+PELVIS(CONTRAST ONLY)(CPT=74177); Future  - Immunoglobulin A, Quant; Future  - Tissue Transglutaminase Ab, IgA; Future  - Calprotectin, Fecal  - H. Pylori Stool Ag, EIA; Future    2. LLQ pain  - CT ABDOMEN+PELVIS(CONTRAST ONLY)(CPT=74177); Future     Patient is not having LLQ pain today on exam. Will obtain H pylori test and fecal calprotectin. Gluten testing. CT ordered, no prior abdominal imagining. If no findings discussed colonoscopy but would need to be off Apixaban. Recommended continuing Metamucil and Immodium.         Patient Instructions   -Continue Metamucil  -Ibguard with meals - over the counter  -Schedule CT  -labs today      Orders This Visit:  Orders Placed This Encounter   Procedures    Immunoglobulin A, Quant    Tissue Transglutaminase Ab, IgA    Calprotectin, Fecal    H. Pylori Stool Ag, EIA       Meds This Visit:  Requested Prescriptions      No prescriptions requested or ordered in this encounter       Imaging &  Referrals:  CT ABDOMEN+PELVIS(CONTRAST ONLY)(NRN=[id number])      VENICE Tobias    [facility] Gastroenterology  10/24/2024               [1] No Known Allergies

## 2024-10-25 ENCOUNTER — MED REC SCAN ONLY (OUTPATIENT)
Dept: INTERNAL MEDICINE CLINIC | Facility: CLINIC | Age: 84
End: 2024-10-25

## 2024-11-04 ENCOUNTER — MED REC SCAN ONLY (OUTPATIENT)
Dept: INTERNAL MEDICINE CLINIC | Facility: CLINIC | Age: 84
End: 2024-11-04

## 2024-11-04 ENCOUNTER — OFFICE VISIT (OUTPATIENT)
Dept: INTERNAL MEDICINE CLINIC | Facility: CLINIC | Age: 84
End: 2024-11-04

## 2024-11-04 VITALS
WEIGHT: 105 LBS | BODY MASS INDEX: 24 KG/M2 | DIASTOLIC BLOOD PRESSURE: 76 MMHG | HEART RATE: 68 BPM | SYSTOLIC BLOOD PRESSURE: 138 MMHG

## 2024-11-04 DIAGNOSIS — C30.0 CANCER OF NASAL CAVITY AND SINUS (HCC): ICD-10-CM

## 2024-11-04 DIAGNOSIS — I10 ESSENTIAL HYPERTENSION: Primary | ICD-10-CM

## 2024-11-04 DIAGNOSIS — I48.91 NEW ONSET A-FIB (HCC): ICD-10-CM

## 2024-11-04 DIAGNOSIS — N18.30 STAGE 3 CHRONIC KIDNEY DISEASE, UNSPECIFIED WHETHER STAGE 3A OR 3B CKD (HCC): Chronic | ICD-10-CM

## 2024-11-04 DIAGNOSIS — C31.9 CANCER OF NASAL CAVITY AND SINUS (HCC): ICD-10-CM

## 2024-11-04 DIAGNOSIS — E53.8 VITAMIN B12 DEFICIENCY: ICD-10-CM

## 2024-11-04 RX ORDER — METOPROLOL TARTRATE 100 MG/1
100 TABLET ORAL 2 TIMES DAILY
COMMUNITY
Start: 2024-09-21

## 2024-11-04 NOTE — PROGRESS NOTES
You wake up at 1 PM this Subjective:   Carmen Cox is a 84 year old     Chief Complaint   Patient presents with    Hypertension   Pt presents with her daughter for f/u visit   HTN   state  she is overall doing very well patient states she feels better now   Bp is  better on Metoprolol 100 mg bis   diarrhea is stable started taking the fiber supplement and Imodium advised by the Dr. Palmer.   denies chest pain, shortness of breath, dyspnea on exertion or heart palpitations also denies nausea, vomiting, constipation or abdominal pain. No fever, chills.    Patient states she still  have some  pains in the left side of the head but improved on  chemo th  every 3 weeks      The rest of the review of systems, see below.     Allergies:  She has No Known Allergies.    Current Medications:  Outpatient Medications Marked as Taking for the 11/4/24 encounter (Office Visit) with Subhash Lewis MD   Medication Sig    metoprolol tartrate 100 MG Oral Tab Take 1 tablet (100 mg total) by mouth 2 (two) times daily.    famotidine 20 MG Oral Tab Take 1 tablet (20 mg total) by mouth 2 (two) times daily before meals.    Cyanocobalamin (VITAMIN B-12) 2500 MCG Sublingual SL Tab Take 1  Tab under the tongue daily    amLODIPine 5 MG Oral Tab Take 1 tablet (5 mg total) by mouth daily.    gabapentin 300 MG Oral Cap 1 pill TID    polypropylene glycol- 0.4-0.3 % Ophthalmic Solution Place 1 drop into both eyes as needed (Dry eyes).    losartan 100 MG Oral Tab Take 1 tablet (100 mg total) by mouth daily.    rivaroxaban 15 MG Oral Tab Take 1 tablet (15 mg total) by mouth daily with food.    acetaminophen 500 MG Oral Tab Take 1 tablet (500 mg total) by mouth every 6 (six) hours as needed for Fever (equal to or greater than 100.4).    MELATONIN OR Take 1 tablet by mouth 5 days out of the week.  No weekends    Omega-3 Fatty Acids (FISH OIL OR) Take 1 teaspoon by mouth daily    Ascorbic Acid (VITAMIN C) 100 MG Oral Tab Take 1 tablet (100 mg  total) by mouth daily.    Multiple Vitamins-Minerals (MULTIVITAMIN ADULTS 50+) Oral Tab Take 1 tablet by mouth daily.    Cholecalciferol (VITAMIN D3) 25 MCG (1000 UT) Oral Cap Take 1 tablet by mouth daily.    aspirin 81 MG Oral Tab Take 1 tablet (81 mg total) by mouth daily.       Medical History:  She  has a past medical history of Afib (HCC), Blurry vision, left eye (09/30/2021), Cancer (HCC), Decreased GFR (08/12/2019), Essential hypertension, Heart palpitations (09/30/2021), Hypertension, Lipid screening (06/25/2014), Malaise and fatigue (03/24/2017), Onychia of toe of left foot (2009), Skin lesions (06/21/2021), and Syncope and collapse (09/30/2021).  Surgical History:  She  has a past surgical history that includes appendectomy.   Family History:  Her family history is not on file.  Social History:  She  reports that she has never smoked. She has never been exposed to tobacco smoke. She has never used smokeless tobacco. She reports that she does not drink alcohol and does not use drugs.    Tobacco:  She has never smoked tobacco.    Review of Systems   Constitutional:  Negative for chills, fatigue and fever.   HENT: Negative.  Negative for facial swelling and sore throat.    Eyes:  Negative for pain (left eye improved presssure), discharge and redness.   Respiratory:  Negative for apnea, cough, shortness of breath and wheezing.    Cardiovascular:  Negative for chest pain, palpitations and leg swelling.   Gastrointestinal: Negative.  Negative for abdominal pain, constipation, diarrhea, nausea and vomiting.   Genitourinary: Negative.  Negative for dysuria and frequency.   Musculoskeletal:   Negative for back pain   Skin: Negative.  Negative for color change and pallor.   Neurological: Negative.  Negative for dizziness, tremors, speech difficulty, weakness, light-headedness           Psychiatric/Behavioral: Negative.  Negative for confusion. The patient is not nervous/anxious.        Objective:   Physical  ExamNeurological:      Cranial Nerves: No cranial nerve deficit or dysarthria.      Sensory: Sensation is intact.      Motor: No weakness, tremor, atrophy or abnormal muscle tone.      Comments: Left eye  vision good per ot       General Appearance:  Alert, cooperative, no distress, appears stated age   Head:  Normocephalic, without obvious abnormality, atraumatic                           Lungs:   Clear to auscultation bilaterally, respirations unlabored   Heart:  irregular rate and rhythm, S1 and S2 normal, no murmur, rub, or gallop   Abdomen:   Soft, non-tender, bowel sounds active all four quadrants,  no masses, no organomegaly    Pelvic:    Extremities: Extremities normal, atraumatic, no cyanosis or edema   Pulses: 2+ and symmetric   Skin: Skin color, texture, turgor normal, no rashes or lesions   Lymph nodes: Cervical nodes normal   Neurologic: Normal , no motor  weakness        /76   Pulse 68   Wt 105 lb (47.6 kg)   BMI 23.54 kg/m²  Estimated body mass index is 23.54 kg/m² as calculated from the following:    Height as of 10/24/24: 4' 8\" (1.422 m).    Weight as of this encounter: 105 lb (47.6 kg).      1.     Essential hypertension (Primary)  Stable   Take high blood pressure medication as perscribed   Keep a Low- sodium diet   Maintain walking and activity - as tolerated   Keep a log of blood pressure and heart rate at home   Patient verbalizes understanding and compliance  Metoprolol 100  mg 1  tab  bid   Losartan 100 mg every day   amlodipine 5   mg every day     CPM  Stable cpm   Monitor bp at home   Call if any concerns pr sy or elevated bp  Continue present management        Atrial fibrillation, unspecified type (HCC)  CPM - xarelto 15 mg every day   Metoprolol  100 mg bid   Patient follows with cardiology Dr Juan Stiles   On BB stable cpm      Vitamin B12 deficiency  - Vitamin B12; Future   vit b12 2500 mg every day or  other  day       Nasal cavity,sinus  carcinoma   On  chemo x 3 -   3  weeks apart since 12/24   MRI  -  tumor is  shrinking  and   f/u Dr Rivas at [facility]   Stable cpm      Hx diarrhea  stable  On fiber tablet   Imodium as needed  Diet     The patient and her daughter Viry indicates understanding of these issues and agrees to the plan.  Continue with current treatment plan.  Reinforced healthy diet, lifestyle, and exercise.      Return in about 3 months (around 2/4/2025), or if symptoms worsen or fail to improve, for Follow up visit.           Flu shot today       Supplementary Documentation:   General Health:          Carmen Cox's SCREENING SCHEDULE   Tests on this list are recommended by your physician but may not be covered, or covered at this frequency, by your insurer.   Please check with your insurance carrier before scheduling to verify coverage.   PREVENTATIVE SERVICES FREQUENCY &  COVERAGE DETAILS LAST COMPLETION DATE   Diabetes Screening    Fasting Blood Sugar /  Glucose    One screening every 12 months if never tested or if previously tested but not diagnosed with pre-diabetes   One screening every 6 months if diagnosed with pre-diabetes Lab Results   Component Value Date    GLU 84 11/17/2023        Cardiovascular Disease Screening    Lipid Panel  Cholesterol  Lipoprotein (HDL)  Triglycerides Covered every 5 years for all Medicare beneficiaries without apparent signs or symptoms of cardiovascular disease Lab Results   Component Value Date    CHOLEST 160 04/21/2023    HDL 59 04/21/2023    LDL 87 04/21/2023    TRIG 71 04/21/2023         Electrocardiogram (EKG)   Covered if needed at Welcome to Medicare, and non-screening if indicated for medical reasons 11/16/2023      Ultrasound Screening for Abdominal Aortic Aneurysm (AAA) Covered once in a lifetime for one of the following risk factors    Men who are 65-75 years old and have ever smoked    Anyone with a family history -     Colorectal Cancer Screening  Covered for ages 50-85; only need ONE of the following:     Colonoscopy   Covered every 10 years    Covered every 2 years if patient is at high risk or previous colonoscopy was abnormal -    No recommendations at this time    Flexible Sigmoidoscopy   Covered every 4 years -    Fecal Occult Blood Test Covered annually -   Bone Density Screening    Bone density screening    Covered every 2 years after age 65 if diagnosed with risk of osteoporosis or estrogen deficiency.    Covered yearly for long-term glucocorticoid medication use (Steroids) Last Dexa Scan:    XR DEXA BONE DENSITOMETRY (CPT=77080) 04/11/2023      No recommendations at this time   Pap and Pelvic    Pap   Covered every 2 years for women at normal risk; Annually if at high risk -  No recommendations at this time    Chlamydia Annually if high risk -  No recommendations at this time   Screening Mammogram    Mammogram     Recommend annually for all female patients aged 40 and older    One baseline mammogram covered for patients aged 35-39 -    No recommendations at this time    Immunizations    Influenza Covered once per flu season  Please get every year 11/04/2024  No recommendations at this time    Pneumococcal Each vaccine (Idxodeh59 & [id number]) covered once after 65 Prevnar 13: 07/26/2018    [id number]: -     No recommendations at this time    Hepatitis B One screening covered for patients with certain risk factors   -  No recommendations at this time    Tetanus Toxoid Not covered by Medicare Part B unless medically necessary (cut with metal); may be covered with your pharmacy prescription benefits -    Tetanus, Diptheria and Pertusis TD and TDaP Not covered by Medicare Part B -  No recommendations at this time    Zoster Not covered by Medicare Part B; may be covered with your pharmacy  prescription benefits -  Zoster Vaccines(1 of 2) Never done     Annual Monitoring of Persistent Medications (ACE/ARB, digoxin diuretics, anticonvulsants)    Potassium Annually Lab Results   Component Value Date    K 3.8  11/17/2023         Creatinine   Annually Lab Results   Component Value Date    CREATSERUM 0.82 11/17/2023         BUN Annually Lab Results   Component Value Date    BUN 10 11/17/2023       Drug Serum Conc Annually No results found for: \"DIGOXIN\", \"DIG\", \"VALP\"

## 2024-11-07 PROCEDURE — 83993 ASSAY FOR CALPROTECTIN FECAL: CPT | Performed by: NURSE PRACTITIONER

## 2024-11-07 PROCEDURE — 87338 HPYLORI STOOL AG IA: CPT

## 2024-11-08 ENCOUNTER — LAB ENCOUNTER (OUTPATIENT)
Dept: LAB | Facility: HOSPITAL | Age: 84
End: 2024-11-08
Attending: NURSE PRACTITIONER
Payer: MEDICARE

## 2024-11-08 DIAGNOSIS — K52.9 CHRONIC DIARRHEA: ICD-10-CM

## 2024-11-09 LAB
CALPROTECTIN STL-MCNT: 420 ΜG/G (ref ?–50)
H PYLORI AG STL QL IA: NEGATIVE

## 2024-11-11 ENCOUNTER — LAB ENCOUNTER (OUTPATIENT)
Dept: LAB | Facility: HOSPITAL | Age: 84
End: 2024-11-11
Attending: NURSE PRACTITIONER
Payer: MEDICARE

## 2024-11-11 ENCOUNTER — TELEPHONE (OUTPATIENT)
Facility: CLINIC | Age: 84
End: 2024-11-11

## 2024-11-11 DIAGNOSIS — K52.9 CHRONIC DIARRHEA: ICD-10-CM

## 2024-11-11 LAB
IGA SERPL-MCNC: 218.8 MG/DL (ref 40–350)
TTG IGA SER-ACNC: 0.3 U/ML (ref ?–7)

## 2024-11-11 PROCEDURE — 82784 ASSAY IGA/IGD/IGG/IGM EACH: CPT

## 2024-11-11 PROCEDURE — 86364 TISS TRNSGLTMNASE EA IG CLAS: CPT

## 2024-11-11 PROCEDURE — 36415 COLL VENOUS BLD VENIPUNCTURE: CPT

## 2024-11-11 RX ORDER — SODIUM, POTASSIUM,MAG SULFATES 17.5-3.13G
SOLUTION, RECONSTITUTED, ORAL ORAL
Qty: 1 EACH | Refills: 0 | Status: SHIPPED | OUTPATIENT
Start: 2024-11-11

## 2024-11-11 NOTE — TELEPHONE ENCOUNTER
Called patient/daughter Viry to discussed elevated fecal calprotectin.     Schedulers- please schedule colonoscopy.       1. Schedule colonoscopy with Urgent Pool Endoscopist  Diagnosis: diarrhea, abnormal stool testing   Sedation:  Prep: Suprep    2. MEDICATION CHANGES PRIOR TO PROCEDURE    48 hours BEFORE procedure *HOLD rivaroxaban (Xarelto) apixaban (Eliquis) (savaysa/pradaxa too)  DO NOT TAKE: Any form of alcohol, recreational drugs and any forms of erectile dysfunction medications 24 hours prior to procedure.    GI RNs please reach out to cardiology Dr Martinez, for approval to hold anticoagulant/antiplatelet prior to procedure. Also request cardiac clearance. To the patient: you are RESPONSIBLE for contacting your cardiologist to be sure blood thinner modifications are approved and no further cardiac testing is needed for cardiac clearance. Failure to obtain clearance can result in procedure cancellations.     3.  bowel prep from pharmacy   You can pick the bowel prep up now and store in a cool, dry place in your home until your scheduled bowel prep start date.    4. Read all bowel prep instructions carefully. Bowel prep instructions can also be found online at:  [email or web address].[email or web address]     5. AVOID seeds, nuts, popcorn, raw fruits and vegetables for 5 days before procedure    6. If you start any NEW medication after your visit today, please notify us. Certain medications (like iron or weight loss medications) will need to be held before the procedure, or the procedure cannot be performed safely.

## 2024-12-02 ENCOUNTER — TELEPHONE (OUTPATIENT)
Dept: HEMATOLOGY/ONCOLOGY | Facility: HOSPITAL | Age: 84
End: 2024-12-02

## 2024-12-02 NOTE — TELEPHONE ENCOUNTER
New Consult  [id number]  Kenny Morales  : 1940  Ph: [phone]   speak daughter  FEI  Dr. Boss    to   Dr. 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

## 2024-12-06 ENCOUNTER — TELEPHONE (OUTPATIENT)
Facility: CLINIC | Age: 84
End: 2024-12-06

## 2024-12-06 NOTE — TELEPHONE ENCOUNTER
1st Reminder letter was sent to patient mychart for orders pending:     CT ABDOMEN+PELVIS(CONTRAST ONLY)(CPT=74177) (Order #[id number]) on 10/24/24

## 2024-12-09 ENCOUNTER — OFFICE VISIT (OUTPATIENT)
Dept: HEMATOLOGY/ONCOLOGY | Facility: HOSPITAL | Age: 84
End: 2024-12-09
Attending: INTERNAL MEDICINE
Payer: MEDICARE

## 2024-12-09 VITALS
HEIGHT: 56 IN | DIASTOLIC BLOOD PRESSURE: 76 MMHG | SYSTOLIC BLOOD PRESSURE: 162 MMHG | BODY MASS INDEX: 23.17 KG/M2 | RESPIRATION RATE: 18 BRPM | TEMPERATURE: 98 F | HEART RATE: 62 BPM | OXYGEN SATURATION: 97 % | WEIGHT: 103 LBS

## 2024-12-09 DIAGNOSIS — H49.02 LEFT OCULOMOTOR NERVE PALSY: ICD-10-CM

## 2024-12-09 DIAGNOSIS — C30.0 CANCER OF NASAL CAVITY AND SINUS (HCC): Primary | ICD-10-CM

## 2024-12-09 DIAGNOSIS — C44.329 SQUAMOUS CELL SKIN CANCER, ORBIT: ICD-10-CM

## 2024-12-09 DIAGNOSIS — C31.9 CANCER OF NASAL CAVITY AND SINUS (HCC): Primary | ICD-10-CM

## 2024-12-09 PROCEDURE — 99205 OFFICE O/P NEW HI 60 MIN: CPT | Performed by: INTERNAL MEDICINE

## 2024-12-09 RX ORDER — HYDROCHLOROTHIAZIDE 25 MG/1
TABLET ORAL
COMMUNITY

## 2024-12-09 NOTE — CONSULTS
[facility] Consultation Note    Patient Name: Carmen Cox   YOB: 1940   Medical Record Number: [id number]   CSN: [id number]   Consulting Physician: Claudio Sow MD  Referring Physician(s): Dr. Subhash Lewis MD   Date of Consultation: 12/9/2024     Reason for Consultation:  locally advanced cancer of the orbit.      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  Her oncologic history is detailed below.    9/8/23 she initially presented with diplopia.  Workup included an MRI that showed left orbital mass with large caliber perineural invasion V1 and V2 traveling proximally through the superior orbital fissure and pterygopalatine fossa to the Meckel's cave as well as traveling to deuterated middle cranial fossa to the enterocolostomy 80s.  Clinically she had involvement of cranial nerves III V1 and V2.    She was seen by head and neck surgery and was not thought a surgical candidate.  She therefore underwent a biopsy on 10/17/2023.  Pathology from both the left orbital mass and left pterygoid fossa both showed poorly differentiated carcinoma with IHC favoring squamous differentiation.    She met with radiation oncology and was not thought a candidate for radiation therapy.  She was therefore recommended palliative systemic immunotherapy with pembrolizumab under the care of Dr. Danny Palmer    She was then initiated on pembrolizumab and has been on this treatment for the last year with an excellent response.  Her recent MRI shows near complete resolution of tumor in the medial aspect left orbit.    Unfortunately due to insurance issues she is having to transfer her care to [facility] which is also much closer to her home.  She denies any significant side effects from her pembrolizumab therapy except for occasional intermittent diarrhea.   She has seen gastroenterology scheduled for a colonoscopy in February    Past Oncologic History  [id number], locally advanced, poorly differentiated carcinoma, favor squamous differentiation, L nasal cavity:  9/8/23 - locally advanced nasal cavity tumor per MRI (T4bNx)  10/17/23 - L orbital mass bx -> poorly differentiated carcinoma, favor squamous differentiation   12/8/23 - initiated pembrolizumab (200mg q3wks) with Dr. Danny Palmer at [facility]  10/21/24 Most recent imaging was MRI Face which shows complete to near complete resolution of tumor in the medial aspect of the left orbit.  Tumor enhancement your orbital apex appears mildly decreased and enhancement of the Meckel's cave and T2/foramen rotundum is unchanged      Past Medical History:  Past Medical History:    Afib (HCC)    Blurry vision, left eye    Cancer (HCC)    Decreased GFR    Essential hypertension    Heart palpitations    Hypertension    Lipid screening    Malaise and fatigue    Onychia of toe of left foot    Left great toe/Depbridement of at least 2/3 toenail    Skin lesions    Syncope and collapse       Past Surgical History:  Past Surgical History:   Procedure Laterality Date    Appendectomy         Family Medical History:  Family History   Problem Relation Age of Onset    Glaucoma Neg     Macular degeneration Neg     Diabetes Neg        Gyne History:  OB History   No obstetric history on file.       Social History:  Social History     Socioeconomic History    Marital status:      Spouse name: Not on file    Number of children: Not on file    Years of education: Not on file    Highest education level: Not on file   Occupational History    Not on file   Tobacco Use    Smoking status: Never     Passive exposure: Never    Smokeless tobacco: Never   Vaping Use    Vaping status: Never Used   Substance and Sexual Activity    Alcohol use: No     Alcohol/week: 0.0 standard drinks of alcohol    Drug use: No    Sexual activity: Not on file   Other  Topics Concern     Service Not Asked    Blood Transfusions Not Asked    Caffeine Concern Yes     Comment: 1 cup coffee daily    Occupational Exposure Not Asked    Hobby Hazards Not Asked    Sleep Concern Not Asked    Stress Concern Not Asked    Weight Concern Not Asked    Special Diet Not Asked    Back Care Not Asked    Exercise Not Asked    Bike Helmet Not Asked    Seat Belt Not Asked    Self-Exams Not Asked    Grew up on a farm Not Asked    History of tanning Not Asked    Outdoor occupation Not Asked    Pt has a pacemaker No    Pt has a defibrillator No    Breast feeding No    Reaction to local anesthetic No   Social History Narrative    Not on file     Social Drivers of Health     Financial Resource Strain: Low Risk  (10/4/2021)    [facility]    Overall Financial Resource Strain (CARDIA)     Difficulty of Paying Living Expenses: Not very hard   Food Insecurity: No Food Insecurity (11/15/2023)    Food Insecurity     Food Insecurity: Never true   Transportation Needs: No Transportation Needs (11/15/2023)    Transportation Needs     Lack of Transportation: No   Physical Activity: Not on file   Stress: Not on file   Social Connections: Not on file   Housing Stability: Low Risk  (11/15/2023)    Housing Stability     Housing Instability: No     Housing Instability Emergency: Not on file     Crib or Bassinette: Not on file       Allergies:   Allergies[1]    Current Medications:    Current Outpatient Medications:     Na Sulfate-K Sulfate-Mg Sulf (SUPREP BOWEL PREP KIT) 17.5-3.13-1.6 GM/177ML Oral Solution, Take as discussed in clinic, Disp: 1 each, Rfl: 0    metoprolol tartrate 100 MG Oral Tab, Take 1 tablet (100 mg total) by mouth 2 (two) times daily., Disp: , Rfl:     famotidine 20 MG Oral Tab, Take 1 tablet (20 mg total) by mouth 2 (two) times daily before meals., Disp: 180 tablet, Rfl: 3    Cyanocobalamin (VITAMIN B-12) 2500 MCG Sublingual SL Tab, Take 1  Tab  under the tongue daily, Disp: 90 tablet, Rfl: 2    gabapentin 300 MG Oral Cap, 1 pill TID, Disp: 270 capsule, Rfl: 3    polypropylene glycol- 0.4-0.3 % Ophthalmic Solution, Place 1 drop into both eyes as needed (Dry eyes)., Disp: , Rfl:     losartan 100 MG Oral Tab, Take 1 tablet (100 mg total) by mouth daily., Disp: 90 tablet, Rfl: 3    rivaroxaban 15 MG Oral Tab, Take 1 tablet (15 mg total) by mouth daily with food., Disp: 30 tablet, Rfl: 0    acetaminophen 500 MG Oral Tab, Take 1 tablet (500 mg total) by mouth every 6 (six) hours as needed for Fever (equal to or greater than 100.4)., Disp: 1 tablet, Rfl: 0    MELATONIN OR, Take 1 tablet by mouth 5 days out of the week.  No weekends, Disp: , Rfl:     Ascorbic Acid (VITAMIN C) 100 MG Oral Tab, Take 1 tablet (100 mg total) by mouth daily., Disp: , Rfl:     Multiple Vitamins-Minerals (MULTIVITAMIN ADULTS 50+) Oral Tab, Take 1 tablet by mouth daily., Disp: , Rfl:     Cholecalciferol (VITAMIN D3) 25 MCG (1000 UT) Oral Cap, Take 1 tablet by mouth daily., Disp: , Rfl:     aspirin 81 MG Oral Tab, Take 1 tablet (81 mg total) by mouth daily., Disp: , Rfl:     hydroCHLOROthiazide 25 MG Oral Tab, TAKE 1 TABLET BY MOUTH ONCE DAILY. TAKE AT THE SAME TIME AS LOSARTAN, Disp: , Rfl:       Review of Systems:  A comprehensive 14 point review of systems was completed.  Pertinent positives and negatives noted in the the HPI.     Vital Signs:  BP (!) 162/76 (BP Location: Left arm, Patient Position: Sitting, Cuff Size: adult)   Pulse 62   Temp 98.1 °F (36.7 °C) (Oral)   Resp 18   Ht 1.422 m (4' 8\")   Wt 46.7 kg (103 lb)   SpO2 97%   BMI 23.09 kg/m²     Physical Examination:    General: Patient is alert and oriented x 3, not in acute distress.  Psych:  normal mood and affect  HEENT: EOMs intact. Has decreased EOM on th left  Neck: No JVD. No palpable lymphadenopathy. Neck is supple.  Lymphatics: There is no palpable lymphadenopathy throughout in the cervical,  supraclavicular or axillary regions.  Chest: Clear to auscultation. No wheezes or rales.  Heart: Regular rate and rhythm. S1S2 normal.  Abdomen: Soft, non tender, no hepatosplenomegaly.  No palpable mass.  Extremities: No edema or calf tenderness.  Neurological: Grossly intact.     Performance Status:    ECOG-1    Labs:    Lab Results   Component Value Date/Time    WBC 2.5 (L) 11/17/2023 04:43 AM    RBC 3.74 (L) 11/17/2023 04:43 AM    HGB 10.7 (L) 11/17/2023 04:43 AM    HCT 32.4 (L) 11/17/2023 04:43 AM    MCV 86.6 11/17/2023 04:43 AM    MCH 28.6 11/17/2023 04:43 AM    MCHC 33.0 11/17/2023 04:43 AM    RDW 13.8 11/17/2023 04:43 AM    NEPRELIM 1.34 (L) 11/17/2023 04:43 AM    .0 11/17/2023 04:43 AM       Lab Results   Component Value Date/Time    GLU 84 11/17/2023 04:43 AM    BUN 10 11/17/2023 04:43 AM    CREATSERUM 0.82 11/17/2023 04:43 AM    GFRNAA 59 (L) 01/04/2022 11:15 AM    CA 8.6 (L) 11/17/2023 04:43 AM    ALB 3.3 11/17/2023 04:43 AM     11/17/2023 04:43 AM    K 3.8 11/17/2023 04:43 AM     11/17/2023 04:43 AM    CO2 25.0 11/17/2023 04:43 AM    ALKPHO 71 11/17/2023 04:43 AM    AST 49 (H) 11/17/2023 04:43 AM    ALT 27 11/17/2023 04:43 AM       Imaging:    Reviewed outside imaging    Impression:      ICD-10-CM    1. 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)  C30.0 MRI FACE/SINUS (W+WO)(CPT=70543)    C31.9       2. 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  She is having to transfer her care here from [place] due to insurance issues.    I had a long discussion with patient and her daughter and explained that her immunotherapy treatment is palliative in intent and not curative.  However given the excellent response would expect she will continue  to have good disease control.    Plan:  She wants to transfer her care at the end of the year so we will tentatively plan to start her pembrolizumab the first week of January.  Anticipated side effects of this therapy including autoimmune colitis dermatitis endocrinopathies etc. were discussed.  Plan MRI of the orbits/face and sinuses in January.  She will return for follow-up prior to her second cycle of pembrolizumab.      Emotional Well Being:    Emotional Well Being discussed, patient aware of support systems available through the Cancer Center. No acute issues.     The diagnosis, prognosis, treatment goals, plan for treatment, and expected response was explained to the patient.     High complexity MDM    Thank you Dr Subhash Lewis MD for the opportunity to participate in the care of this interesting patient. Please do contact me if I may be of any further assistance    Claudio oSw MD  [facility] Hematology/Oncology    Total time on this consult was  >60 minutes, more than 50% of the time was spent in counseling and/or coordination of care related to squamous cell ca of orbit/sinus.         [1] No Known Allergies

## 2024-12-16 ENCOUNTER — MED REC SCAN ONLY (OUTPATIENT)
Dept: INTERNAL MEDICINE CLINIC | Facility: CLINIC | Age: 84
End: 2024-12-16

## 2024-12-31 ENCOUNTER — APPOINTMENT (OUTPATIENT)
Age: 84
End: 2024-12-31
Attending: INTERNAL MEDICINE
Payer: MEDICARE

## 2024-12-31 ENCOUNTER — OFFICE VISIT (OUTPATIENT)
Age: 84
End: 2024-12-31
Attending: INTERNAL MEDICINE
Payer: MEDICARE

## 2024-12-31 ENCOUNTER — SOCIAL WORK SERVICES (OUTPATIENT)
Age: 84
End: 2024-12-31

## 2024-12-31 ENCOUNTER — NURSE ONLY (OUTPATIENT)
Age: 84
End: 2024-12-31
Attending: INTERNAL MEDICINE
Payer: MEDICARE

## 2024-12-31 VITALS
OXYGEN SATURATION: 100 % | DIASTOLIC BLOOD PRESSURE: 66 MMHG | HEART RATE: 57 BPM | RESPIRATION RATE: 18 BRPM | TEMPERATURE: 98 F | HEIGHT: 56 IN | WEIGHT: 104.63 LBS | BODY MASS INDEX: 23.54 KG/M2 | SYSTOLIC BLOOD PRESSURE: 184 MMHG

## 2024-12-31 DIAGNOSIS — C31.9 CANCER OF NASAL CAVITY AND SINUS (HCC): Primary | ICD-10-CM

## 2024-12-31 DIAGNOSIS — C30.0 CANCER OF NASAL CAVITY AND SINUS (HCC): Primary | ICD-10-CM

## 2024-12-31 LAB
ALBUMIN SERPL-MCNC: 4.1 G/DL (ref 3.2–4.8)
ALBUMIN/GLOB SERPL: 1.6 {RATIO} (ref 1–2)
ALP LIVER SERPL-CCNC: 106 U/L
ALT SERPL-CCNC: 23 U/L
ANION GAP SERPL CALC-SCNC: 5 MMOL/L (ref 0–18)
AST SERPL-CCNC: 41 U/L (ref ?–34)
BASOPHILS # BLD AUTO: 0.05 X10(3) UL (ref 0–0.2)
BASOPHILS NFR BLD AUTO: 1.2 %
BILIRUB SERPL-MCNC: 0.5 MG/DL (ref 0.2–1.1)
BUN BLD-MCNC: 19 MG/DL (ref 9–23)
BUN/CREAT SERPL: 22.4 (ref 10–20)
CALCIUM BLD-MCNC: 9.5 MG/DL (ref 8.7–10.4)
CHLORIDE SERPL-SCNC: 101 MMOL/L (ref 98–112)
CO2 SERPL-SCNC: 30 MMOL/L (ref 21–32)
CREAT BLD-MCNC: 0.85 MG/DL
DEPRECATED RDW RBC AUTO: 39.8 FL (ref 35.1–46.3)
EGFRCR SERPLBLD CKD-EPI 2021: 68 ML/MIN/1.73M2 (ref 60–?)
EOSINOPHIL # BLD AUTO: 0.15 X10(3) UL (ref 0–0.7)
EOSINOPHIL NFR BLD AUTO: 3.5 %
ERYTHROCYTE [DISTWIDTH] IN BLOOD BY AUTOMATED COUNT: 13.4 % (ref 11–15)
GLOBULIN PLAS-MCNC: 2.5 G/DL (ref 2–3.5)
GLUCOSE BLD-MCNC: 106 MG/DL (ref 70–99)
HCT VFR BLD AUTO: 33.2 %
HGB BLD-MCNC: 11.4 G/DL
IMM GRANULOCYTES # BLD AUTO: 0.01 X10(3) UL (ref 0–1)
IMM GRANULOCYTES NFR BLD: 0.2 %
LYMPHOCYTES # BLD AUTO: 0.79 X10(3) UL (ref 1–4)
LYMPHOCYTES NFR BLD AUTO: 18.2 %
MCH RBC QN AUTO: 27.9 PG (ref 26–34)
MCHC RBC AUTO-ENTMCNC: 34.3 G/DL (ref 31–37)
MCV RBC AUTO: 81.2 FL
MONOCYTES # BLD AUTO: 0.5 X10(3) UL (ref 0.1–1)
MONOCYTES NFR BLD AUTO: 11.5 %
NEUTROPHILS # BLD AUTO: 2.83 X10 (3) UL (ref 1.5–7.7)
NEUTROPHILS # BLD AUTO: 2.83 X10(3) UL (ref 1.5–7.7)
NEUTROPHILS NFR BLD AUTO: 65.4 %
OSMOLALITY SERPL CALC.SUM OF ELEC: 285 MOSM/KG (ref 275–295)
PLATELET # BLD AUTO: 246 10(3)UL (ref 150–450)
POTASSIUM SERPL-SCNC: 4 MMOL/L (ref 3.5–5.1)
PROT SERPL-MCNC: 6.6 G/DL (ref 5.7–8.2)
RBC # BLD AUTO: 4.09 X10(6)UL
SODIUM SERPL-SCNC: 136 MMOL/L (ref 136–145)
T4 FREE SERPL-MCNC: 1.2 NG/DL (ref 0.8–1.7)
TSI SER-ACNC: 2.92 UIU/ML (ref 0.55–4.78)
WBC # BLD AUTO: 4.3 X10(3) UL (ref 4–11)

## 2024-12-31 NOTE — PROGRESS NOTES
[facility] Consultation Note    Patient Name: Carmen Cox   YOB: 1940   Medical Record Number: [id number]   CSN: [id number]   Consulting Physician: Claudio Sow MD  Referring Physician(s): Dr. Subhash Lewis MD   Date of Consultation: 12/31/2024     Reason for Consultation:  locally advanced cancer of the orbit.      Interval History:  Patient presents with two daughters today prior to C2 pembrolizumab.  She does not have acute complaints, does endorse intermittent diarrhea but this is not new.       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  Her oncologic history is detailed below.    9/8/23 she initially presented with diplopia.  Workup included an MRI that showed left orbital mass with large caliber perineural invasion V1 and V2 traveling proximally through the superior orbital fissure and pterygopalatine fossa to the Meckel's cave as well as traveling to deuterated middle cranial fossa to the enterocolostomy 80s.  Clinically she had involvement of cranial nerves III V1 and V2.    She was seen by head and neck surgery and was not thought a surgical candidate.  She therefore underwent a biopsy on 10/17/2023.  Pathology from both the left orbital mass and left pterygoid fossa both showed poorly differentiated carcinoma with IHC favoring squamous differentiation.    She met with radiation oncology and was not thought a candidate for radiation therapy.  She was therefore recommended palliative systemic immunotherapy with pembrolizumab under the care of Dr. Danny Palmer    She was then initiated on pembrolizumab and has been on this treatment for the last year with an excellent response.  Her recent MRI shows near complete resolution of tumor in the medial aspect left orbit.    Unfortunately due to insurance issues she is having to transfer  her care to [facility] which is also much closer to her home.  She denies any significant side effects from her pembrolizumab therapy except for occasional intermittent diarrhea.  She has seen gastroenterology scheduled for a colonoscopy in February    Past Oncologic History  [id number], locally advanced, poorly differentiated carcinoma, favor squamous differentiation, L nasal cavity:  9/8/23 - locally advanced nasal cavity tumor per MRI (T4bNx)  10/17/23 - L orbital mass bx -> poorly differentiated carcinoma, favor squamous differentiation   12/8/23 - initiated pembrolizumab (200mg q3wks) with Dr. Danny Palmer at [place]  10/21/24 Most recent imaging was MRI Face which shows complete to near complete resolution of tumor in the medial aspect of the left orbit.  Tumor enhancement your orbital apex appears mildly decreased and enhancement of the Meckel's cave and T2/foramen rotundum is unchanged      Past Medical History:  Past Medical History:    Afib (HCC)    Blurry vision, left eye    Cancer (HCC)    Decreased GFR    Essential hypertension    Heart palpitations    Hypertension    Lipid screening    Malaise and fatigue    Onychia of toe of left foot    Left great toe/Depbridement of at least 2/3 toenail    Skin lesions    Syncope and collapse       Past Surgical History:  Past Surgical History:   Procedure Laterality Date    Appendectomy         Family Medical History:  Family History   Problem Relation Age of Onset    Glaucoma Neg     Macular degeneration Neg     Diabetes Neg        Gyne History:  OB History   No obstetric history on file.       Social History:  Social History     Socioeconomic History    Marital status:      Spouse name: Not on file    Number of children: Not on file    Years of education: Not on file    Highest education level: Not on file   Occupational History    Not on file   Tobacco Use    Smoking status: Never     Passive exposure: Never    Smokeless tobacco: Never   Vaping Use     Vaping status: Never Used   Substance and Sexual Activity    Alcohol use: No     Alcohol/week: 0.0 standard drinks of alcohol    Drug use: No    Sexual activity: Not on file   Other Topics Concern     Service Not Asked    Blood Transfusions Not Asked    Caffeine Concern Yes     Comment: 1 cup coffee daily    Occupational Exposure Not Asked    Hobby Hazards Not Asked    Sleep Concern Not Asked    Stress Concern Not Asked    Weight Concern Not Asked    Special Diet Not Asked    Back Care Not Asked    Exercise Not Asked    Bike Helmet Not Asked    Seat Belt Not Asked    Self-Exams Not Asked    Grew up on a farm Not Asked    History of tanning Not Asked    Outdoor occupation Not Asked    Pt has a pacemaker No    Pt has a defibrillator No    Breast feeding No    Reaction to local anesthetic No   Social History Narrative    Not on file     Social Drivers of Health     Financial Resource Strain: Low Risk  (10/4/2021)    [facility]    Overall Financial Resource Strain (CARDIA)     Difficulty of Paying Living Expenses: Not very hard   Food Insecurity: No Food Insecurity (11/15/2023)    Food Insecurity     Food Insecurity: Never true   Transportation Needs: No Transportation Needs (11/15/2023)    Transportation Needs     Lack of Transportation: No   Physical Activity: Not on file   Stress: Not on file   Social Connections: Not on file   Housing Stability: Low Risk  (11/15/2023)    Housing Stability     Housing Instability: No     Housing Instability Emergency: Not on file     Crib or Bassinette: Not on file       Allergies:   Allergies[1]    Current Medications:    Current Outpatient Medications:     hydroCHLOROthiazide 25 MG Oral Tab, TAKE 1 TABLET BY MOUTH ONCE DAILY. TAKE AT THE SAME TIME AS LOSARTAN, Disp: , Rfl:     Na Sulfate-K Sulfate-Mg Sulf (SUPREP BOWEL PREP KIT) 17.5-3.13-1.6 GM/177ML Oral Solution, Take as discussed in clinic, Disp: 1 each, Rfl: 0    metoprolol  tartrate 100 MG Oral Tab, Take 1 tablet (100 mg total) by mouth 2 (two) times daily., Disp: , Rfl:     famotidine 20 MG Oral Tab, Take 1 tablet (20 mg total) by mouth 2 (two) times daily before meals., Disp: 180 tablet, Rfl: 3    Cyanocobalamin (VITAMIN B-12) 2500 MCG Sublingual SL Tab, Take 1  Tab under the tongue daily, Disp: 90 tablet, Rfl: 2    gabapentin 300 MG Oral Cap, 1 pill TID, Disp: 270 capsule, Rfl: 3    polypropylene glycol- 0.4-0.3 % Ophthalmic Solution, Place 1 drop into both eyes as needed (Dry eyes)., Disp: , Rfl:     losartan 100 MG Oral Tab, Take 1 tablet (100 mg total) by mouth daily., Disp: 90 tablet, Rfl: 3    rivaroxaban 15 MG Oral Tab, Take 1 tablet (15 mg total) by mouth daily with food., Disp: 30 tablet, Rfl: 0    acetaminophen 500 MG Oral Tab, Take 1 tablet (500 mg total) by mouth every 6 (six) hours as needed for Fever (equal to or greater than 100.4)., Disp: 1 tablet, Rfl: 0    MELATONIN OR, Take 1 tablet by mouth 5 days out of the week.  No weekends, Disp: , Rfl:     Ascorbic Acid (VITAMIN C) 100 MG Oral Tab, Take 1 tablet (100 mg total) by mouth daily., Disp: , Rfl:     Multiple Vitamins-Minerals (MULTIVITAMIN ADULTS 50+) Oral Tab, Take 1 tablet by mouth daily., Disp: , Rfl:     Cholecalciferol (VITAMIN D3) 25 MCG (1000 UT) Oral Cap, Take 1 tablet by mouth daily., Disp: , Rfl:     aspirin 81 MG Oral Tab, Take 1 tablet (81 mg total) by mouth daily., Disp: , Rfl:       Review of Systems:  A comprehensive 14 point review of systems was completed.  Pertinent positives and negatives noted in the the HPI.     Vital Signs:  BP (!) 184/66 (BP Location: Right arm, Patient Position: Sitting)   Pulse 57   Temp 98.1 °F (36.7 °C) (Oral)   Resp 18   Ht 1.422 m (4' 8\")   Wt 47.4 kg (104 lb 9.6 oz)   SpO2 100%   BMI 23.45 kg/m²     Physical Examination:    General: Patient is alert and oriented x 3, not in acute distress. Thin  Psych:  normal mood and affect  HEENT: Has decreased EOM on  the left  Neck: No JVD. Neck is supple.  Lymphatics: There is no palpable lymphadenopathy   Chest: Clear to auscultation. No wheezes or rales.  Heart: Regular rate and rhythm. S1S2 normal.  Abdomen: Soft, non tender, no hepatosplenomegaly.  No palpable mass.  Extremities: No edema or calf tenderness.  Neurological: Grossly intact.     Performance Status:    ECOG-1    Labs:    Lab Results   Component Value Date/Time    WBC 4.3 12/31/2024 09:46 AM    RBC 4.09 12/31/2024 09:46 AM    HGB 11.4 (L) 12/31/2024 09:46 AM    HCT 33.2 (L) 12/31/2024 09:46 AM    MCV 81.2 12/31/2024 09:46 AM    MCH 27.9 12/31/2024 09:46 AM    MCHC 34.3 12/31/2024 09:46 AM    RDW 13.4 12/31/2024 09:46 AM    NEPRELIM 2.83 12/31/2024 09:46 AM    .0 12/31/2024 09:46 AM       Lab Results   Component Value Date/Time     (H) 12/31/2024 09:46 AM    BUN 19 12/31/2024 09:46 AM    CREATSERUM 0.85 12/31/2024 09:46 AM    GFRNAA 59 (L) 01/04/2022 11:15 AM    CA 9.5 12/31/2024 09:46 AM    ALB 4.1 12/31/2024 09:46 AM     12/31/2024 09:46 AM    K 4.0 12/31/2024 09:46 AM     12/31/2024 09:46 AM    CO2 30.0 12/31/2024 09:46 AM    ALKPHO 106 12/31/2024 09:46 AM    AST 41 (H) 12/31/2024 09:46 AM    ALT 23 12/31/2024 09:46 AM       Imaging:    Reviewed outside imaging    Impression:    No diagnosis found.  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  She is having to transfer her care here from [place] due to insurance issues.     Long had discussion with patient and her daughter and explained that her immunotherapy treatment is palliative in intent and not curative.  However given the excellent response would expect she will continue to have good disease control.    Plan:  She wants to transfer her care at the end of the year so we will tentatively plan to start her  pembrolizumab the first week of January.  Anticipated side effects of this therapy including autoimmune colitis dermatitis endocrinopathies etc. were discussed.  Plan MRI of the orbits/face and sinuses in January.  Proceed with first cycle of pembrolizumab today (not new to [place])      Emotional Well Being:    Emotional Well Being discussed, patient aware of support systems available through the Cancer Center. No acute issues.     The diagnosis, prognosis, treatment goals, plan for treatment, and expected response was explained to the patient.     High complexity MDM      RTC as scheduled on 1/21/25. Patient aware to call our clinic sooner with any questions or concerns     REINIER Diehl  Hematology/Oncology              [1] No Known Allergies

## 2024-12-31 NOTE — PROGRESS NOTES
SW completed an assessment with pt to provide support and encouragement due to new chemo starting today.        met with patient and her dtr for introduction and role explanation. Distress screen was not available for pt.     Patient lives in [place] alone, but has a good support system from family and Religious.       educated on Power of  for Healthcare, providing document for review; Patient is aware to reach out if they choose to complete.      Educated on available resources, providing Social Work Support Packet including Cancer Center Support Services, [facility], Transportation, and Home Care contacts. Educated on BHI if desired. Contact provided and family is aware to reach out with any needs. Bringing Jaimie Bag given, which patient was grateful for.      [email or web address]    Aneta FINK, LILLIAMW  [facility] Licensed Clinical

## 2024-12-31 NOTE — PROGRESS NOTES
Pt here for C 1 D 1  Drug(s)keytruda    Arrives Ambulating independently, accompanied by Family member     Patient was evaluated today by MD and Treatment Nurse.    Oral medications included in this regimen:  no    Patient confirms comprehension of cancer treatment schedule:  yes    Pregnancy screening:  Not applicable    Modifications in dose or schedule:  No    Medications appearance and physical integrity checked by RN: yes.    Chemotherapy IV pump settings verified by 2 RNs:  No due to targeted therapy IV administration.  Frequency of blood return and site check throughout administration: Prior to administration and At completion of therapy     Infusion/treatment outcome:  patient tolerated treatment without incident    Education Record    Learner:  Patient and Family Member  Barriers / Limitations:  None  Method:  Discussion and Reinforcement  Education / instructions given:  review medication profile, possible SE, infusion center protocol    Outcome:  Shows understanding  Patient transferred from [place] so is familiar with Keytruda  infusions  Discharged Home, Ambulating independently, accompanied by:Family member    Patient/family verbalized understanding of future appointments: by printed AVS

## 2025-01-02 ENCOUNTER — TELEPHONE (OUTPATIENT)
Age: 85
End: 2025-01-02

## 2025-01-02 NOTE — TELEPHONE ENCOUNTER
Toxicities: C1 D1 Pembrolizumab on 12/31/2024    Viry reports Carmen is feeling \"really good.\" No side effects at this time. I encouraged her to please call the office if Carmen is not feeling well or they have any questions or concerns. She agreed and thanked me for checking on Carmen.

## 2025-01-21 ENCOUNTER — TELEPHONE (OUTPATIENT)
Age: 85
End: 2025-01-21

## 2025-01-21 ENCOUNTER — OFFICE VISIT (OUTPATIENT)
Age: 85
End: 2025-01-21
Attending: INTERNAL MEDICINE
Payer: MEDICARE

## 2025-01-21 ENCOUNTER — NURSE ONLY (OUTPATIENT)
Age: 85
End: 2025-01-21
Attending: INTERNAL MEDICINE
Payer: MEDICARE

## 2025-01-21 VITALS
TEMPERATURE: 98 F | DIASTOLIC BLOOD PRESSURE: 87 MMHG | OXYGEN SATURATION: 98 % | BODY MASS INDEX: 23.48 KG/M2 | HEIGHT: 56 IN | WEIGHT: 104.38 LBS | SYSTOLIC BLOOD PRESSURE: 156 MMHG | HEART RATE: 82 BPM | RESPIRATION RATE: 18 BRPM

## 2025-01-21 DIAGNOSIS — C31.9 CANCER OF NASAL CAVITY AND SINUS (HCC): Primary | ICD-10-CM

## 2025-01-21 DIAGNOSIS — C30.0 CANCER OF NASAL CAVITY AND SINUS (HCC): Primary | ICD-10-CM

## 2025-01-21 DIAGNOSIS — H49.02 LEFT OCULOMOTOR NERVE PALSY: ICD-10-CM

## 2025-01-21 DIAGNOSIS — Z51.81 THERAPEUTIC DRUG MONITORING: ICD-10-CM

## 2025-01-21 LAB
ALBUMIN SERPL-MCNC: 4.2 G/DL (ref 3.2–4.8)
ALBUMIN/GLOB SERPL: 1.5 {RATIO} (ref 1–2)
ALP LIVER SERPL-CCNC: 100 U/L
ALT SERPL-CCNC: 18 U/L
ANION GAP SERPL CALC-SCNC: 7 MMOL/L (ref 0–18)
AST SERPL-CCNC: 27 U/L (ref ?–34)
BASOPHILS # BLD AUTO: 0.07 X10(3) UL (ref 0–0.2)
BASOPHILS NFR BLD AUTO: 1.3 %
BILIRUB SERPL-MCNC: 0.6 MG/DL (ref 0.2–1.1)
BUN BLD-MCNC: 29 MG/DL (ref 9–23)
BUN/CREAT SERPL: 26.9 (ref 10–20)
CALCIUM BLD-MCNC: 9.8 MG/DL (ref 8.7–10.4)
CHLORIDE SERPL-SCNC: 100 MMOL/L (ref 98–112)
CO2 SERPL-SCNC: 28 MMOL/L (ref 21–32)
CREAT BLD-MCNC: 1.08 MG/DL
DEPRECATED RDW RBC AUTO: 42.2 FL (ref 35.1–46.3)
EGFRCR SERPLBLD CKD-EPI 2021: 50 ML/MIN/1.73M2 (ref 60–?)
EOSINOPHIL # BLD AUTO: 0.11 X10(3) UL (ref 0–0.7)
EOSINOPHIL NFR BLD AUTO: 2 %
ERYTHROCYTE [DISTWIDTH] IN BLOOD BY AUTOMATED COUNT: 14 % (ref 11–15)
GLOBULIN PLAS-MCNC: 2.8 G/DL (ref 2–3.5)
GLUCOSE BLD-MCNC: 105 MG/DL (ref 70–99)
HCT VFR BLD AUTO: 38.3 %
HGB BLD-MCNC: 12.8 G/DL
IMM GRANULOCYTES # BLD AUTO: 0.02 X10(3) UL (ref 0–1)
IMM GRANULOCYTES NFR BLD: 0.4 %
LYMPHOCYTES # BLD AUTO: 1 X10(3) UL (ref 1–4)
LYMPHOCYTES NFR BLD AUTO: 18.2 %
MCH RBC QN AUTO: 27.7 PG (ref 26–34)
MCHC RBC AUTO-ENTMCNC: 33.4 G/DL (ref 31–37)
MCV RBC AUTO: 82.9 FL
MONOCYTES # BLD AUTO: 0.57 X10(3) UL (ref 0.1–1)
MONOCYTES NFR BLD AUTO: 10.4 %
NEUTROPHILS # BLD AUTO: 3.71 X10 (3) UL (ref 1.5–7.7)
NEUTROPHILS # BLD AUTO: 3.71 X10(3) UL (ref 1.5–7.7)
NEUTROPHILS NFR BLD AUTO: 67.7 %
OSMOLALITY SERPL CALC.SUM OF ELEC: 286 MOSM/KG (ref 275–295)
PLATELET # BLD AUTO: 324 10(3)UL (ref 150–450)
POTASSIUM SERPL-SCNC: 4.2 MMOL/L (ref 3.5–5.1)
PROT SERPL-MCNC: 7 G/DL (ref 5.7–8.2)
RBC # BLD AUTO: 4.62 X10(6)UL
SODIUM SERPL-SCNC: 135 MMOL/L (ref 136–145)
WBC # BLD AUTO: 5.5 X10(3) UL (ref 4–11)

## 2025-01-21 NOTE — PROGRESS NOTES
[facility] Office Note      Patient Name: Carmen Cox   YOB: 1940   Medical Record Number: [id number]   CSN: [id number]   Consulting Physician: Claudio Sow MD    Reason for Consultation:  locally advanced cancer of the nasopharynx/orbit.    Current Therapy  Pembrolizumab - started 12/8/23    INTERVAL HISTORY  Patient returns for follow-up. Since the last visit She has done well.  She tolerated her last dose of pembrolizumab without any major issues.  Diarrhea has improved no skin rash.  She had 1 episode of bleeding from the right naris that stopped on pressure.     Patient denies any nausea, vomiting, fevers, chills or pain. Pt also denies any bleeding, specifically hematuria, hematemesis or hemoptysis.      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  Her oncologic history is detailed below.    9/8/23 she initially presented with diplopia.  Workup included an MRI that showed left orbital mass with large caliber perineural invasion V1 and V2 traveling proximally through the superior orbital fissure and pterygopalatine fossa to the Meckel's cave as well as traveling to deuterated middle cranial fossa to the enterocolostomy 80s.  Clinically she had involvement of cranial nerves III V1 and V2.    She was seen by head and neck surgery and was not thought a surgical candidate.  She therefore underwent a biopsy on 10/17/2023.  Pathology from both the left orbital mass and left pterygoid fossa both showed poorly differentiated carcinoma with IHC favoring squamous differentiation.    She met with radiation oncology and was not thought a candidate for radiation therapy.  She was therefore recommended palliative systemic immunotherapy with pembrolizumab under the care of Dr. Danny Palmer    She was then initiated on pembrolizumab and has been  on this treatment for the last year with an excellent response.  Her recent MRI shows near complete resolution of tumor in the medial aspect left orbit.    Unfortunately due to insurance issues she is having to transfer her care to [facility] which is also much closer to her home.  She denies any significant side effects from her pembrolizumab therapy except for occasional intermittent diarrhea.  She has seen gastroenterology scheduled for a colonoscopy in February    Past Oncologic History  [id number], locally advanced, poorly differentiated carcinoma, favor squamous differentiation, L nasal cavity:  9/8/23 - locally advanced nasal cavity tumor per MRI (T4bNx)  10/17/23 - L orbital mass bx -> poorly differentiated carcinoma, favor squamous differentiation   12/8/23 - initiated pembrolizumab (200mg q3wks) with Dr. Danny Palmer at [facility]  10/21/24 Most recent imaging was MRI Face which shows complete to near complete resolution of tumor in the medial aspect of the left orbit.  Tumor enhancement your orbital apex appears mildly decreased and enhancement of the Meckel's cave and T2/foramen rotundum is unchanged      Past Medical History:  Past Medical History:    Afib (HCC)    Blurry vision, left eye    Cancer (HCC)    Decreased GFR    Essential hypertension    Heart palpitations    Hypertension    Lipid screening    Malaise and fatigue    Onychia of toe of left foot    Left great toe/Depbridement of at least 2/3 toenail    Skin lesions    Syncope and collapse       Past Surgical History:  Past Surgical History:   Procedure Laterality Date    Appendectomy         Family Medical History:  Family History   Problem Relation Age of Onset    Glaucoma Neg     Macular degeneration Neg     Diabetes Neg        Gyne History:  OB History   No obstetric history on file.       Social History:  Social History     Socioeconomic History    Marital status:      Spouse name: Not on file    Number of children: Not on file     Years of education: Not on file    Highest education level: Not on file   Occupational History    Not on file   Tobacco Use    Smoking status: Never     Passive exposure: Never    Smokeless tobacco: Never   Vaping Use    Vaping status: Never Used   Substance and Sexual Activity    Alcohol use: No     Alcohol/week: 0.0 standard drinks of alcohol    Drug use: No    Sexual activity: Not on file   Other Topics Concern     Service Not Asked    Blood Transfusions Not Asked    Caffeine Concern Yes     Comment: 1 cup coffee daily    Occupational Exposure Not Asked    Hobby Hazards Not Asked    Sleep Concern Not Asked    Stress Concern Not Asked    Weight Concern Not Asked    Special Diet Not Asked    Back Care Not Asked    Exercise Not Asked    Bike Helmet Not Asked    Seat Belt Not Asked    Self-Exams Not Asked    Grew up on a farm Not Asked    History of tanning Not Asked    Outdoor occupation Not Asked    Pt has a pacemaker No    Pt has a defibrillator No    Breast feeding No    Reaction to local anesthetic No   Social History Narrative    Not on file     Social Drivers of Health     Financial Resource Strain: Low Risk  (10/4/2021)    [facility]    Overall Financial Resource Strain ([facility])     Difficulty of Paying Living Expenses: Not very hard   Food Insecurity: No Food Insecurity (11/15/2023)    Food Insecurity     Food Insecurity: Never true   Transportation Needs: No Transportation Needs (11/15/2023)    Transportation Needs     Lack of Transportation: No   Physical Activity: Not on file   Stress: Not on file   Social Connections: Not on file   Housing Stability: Low Risk  (11/15/2023)    Housing Stability     Housing Instability: No     Housing Instability Emergency: Not on file     Crib or Bassinette: Not on file       Allergies:   Allergies[1]    Current Medications:    Current Outpatient Medications:     hydroCHLOROthiazide 25 MG Oral Tab, TAKE 1 TABLET BY  MOUTH ONCE DAILY. TAKE AT THE SAME TIME AS LOSARTAN, Disp: , Rfl:     Na Sulfate-K Sulfate-Mg Sulf (SUPREP BOWEL PREP KIT) 17.5-3.13-1.6 GM/177ML Oral Solution, Take as discussed in clinic, Disp: 1 each, Rfl: 0    metoprolol tartrate 100 MG Oral Tab, Take 1 tablet (100 mg total) by mouth 2 (two) times daily., Disp: , Rfl:     famotidine 20 MG Oral Tab, Take 1 tablet (20 mg total) by mouth 2 (two) times daily before meals., Disp: 180 tablet, Rfl: 3    Cyanocobalamin (VITAMIN B-12) 2500 MCG Sublingual SL Tab, Take 1  Tab under the tongue daily, Disp: 90 tablet, Rfl: 2    gabapentin 300 MG Oral Cap, 1 pill TID, Disp: 270 capsule, Rfl: 3    polypropylene glycol- 0.4-0.3 % Ophthalmic Solution, Place 1 drop into both eyes as needed (Dry eyes)., Disp: , Rfl:     losartan 100 MG Oral Tab, Take 1 tablet (100 mg total) by mouth daily., Disp: 90 tablet, Rfl: 3    rivaroxaban 15 MG Oral Tab, Take 1 tablet (15 mg total) by mouth daily with food., Disp: 30 tablet, Rfl: 0    acetaminophen 500 MG Oral Tab, Take 1 tablet (500 mg total) by mouth every 6 (six) hours as needed for Fever (equal to or greater than 100.4)., Disp: 1 tablet, Rfl: 0    MELATONIN OR, Take 1 tablet by mouth 5 days out of the week.  No weekends, Disp: , Rfl:     Ascorbic Acid (VITAMIN C) 100 MG Oral Tab, Take 1 tablet (100 mg total) by mouth daily., Disp: , Rfl:     Multiple Vitamins-Minerals (MULTIVITAMIN ADULTS 50+) Oral Tab, Take 1 tablet by mouth daily., Disp: , Rfl:     Cholecalciferol (VITAMIN D3) 25 MCG (1000 UT) Oral Cap, Take 1 tablet by mouth daily., Disp: , Rfl:     aspirin 81 MG Oral Tab, Take 1 tablet (81 mg total) by mouth daily., Disp: , Rfl:       Review of Systems:  A comprehensive 14 point review of systems was completed.  Pertinent positives and negatives noted in the the HPI.     Vital Signs:  /87 (BP Location: Left arm, Patient Position: Sitting, Cuff Size: adult)   Pulse 82   Temp 97.8 °F (36.6 °C) (Tympanic)   Resp 18    Ht 1.422 m (4' 8\")   Wt 47.4 kg (104 lb 6.4 oz)   SpO2 98%   BMI 23.41 kg/m²     Physical Examination:    General: Patient is alert and oriented x 3, not in acute distress.  Psych:  normal mood and affect  HEENT: EOMs intact. Has decreased EOM on th left  Neck: No JVD. No palpable lymphadenopathy. Neck is supple.  Lymphatics: There is no palpable lymphadenopathy throughout in the cervical, supraclavicular or axillary regions.  Chest: Clear to auscultation. No wheezes or rales.  Heart: Regular rate and rhythm. S1S2 normal.  Abdomen: Soft, non tender, no hepatosplenomegaly.  No palpable mass.  Extremities: No edema or calf tenderness.  Neurological: Grossly intact.     Performance Status:    ECOG-1    Labs:    Lab Results   Component Value Date/Time    WBC 5.5 01/21/2025 01:08 PM    RBC 4.62 01/21/2025 01:08 PM    HGB 12.8 01/21/2025 01:08 PM    HCT 38.3 01/21/2025 01:08 PM    MCV 82.9 01/21/2025 01:08 PM    MCH 27.7 01/21/2025 01:08 PM    MCHC 33.4 01/21/2025 01:08 PM    RDW 14.0 01/21/2025 01:08 PM    NEPRELIM 3.71 01/21/2025 01:08 PM    .0 01/21/2025 01:08 PM       Lab Results   Component Value Date/Time     (H) 01/21/2025 01:08 PM    BUN 29 (H) 01/21/2025 01:08 PM    CREATSERUM 1.08 (H) 01/21/2025 01:08 PM    GFRNAA 59 (L) 01/04/2022 11:15 AM    CA 9.8 01/21/2025 01:08 PM    ALB 4.2 01/21/2025 01:08 PM     (L) 01/21/2025 01:08 PM    K 4.2 01/21/2025 01:08 PM     01/21/2025 01:08 PM    CO2 28.0 01/21/2025 01:08 PM    ALKPHO 100 01/21/2025 01:08 PM    AST 27 01/21/2025 01:08 PM    ALT 18 01/21/2025 01:08 PM       Imaging:    Reviewed outside imaging    Impression:      ICD-10-CM    1. 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 Left oculomotor nerve palsy  H49.02       3. 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  She had to transfer her care here from [place] due to insurance issues.    I had a long discussion with patient and her daughter and explained that her immunotherapy treatment is palliative in intent and not curative.  However given the excellent response would expect she will continue to have good disease control.    Plan:  Continue pembrolizumab  Anticipated side effects of this therapy including autoimmune colitis dermatitis endocrinopathies etc. were discussed.  Plan MRI of the orbits/face and sinuses in next few weeks  She will return for follow-up prior to her next cycle of pembrolizumab.      Emotional Well Being:    Emotional Well Being discussed, patient aware of support systems available through the Cancer Center. No acute issues.     The diagnosis, prognosis, treatment goals, plan for treatment, and expected response was explained to the patient.     High complexity MDM    Thank you Dr Subhash Lewis MD for the opportunity to participate in the care of this interesting patient. Please do contact me if I may be of any further assistance    Claudio Sow MD  [facility] Hematology/Oncology           [1] No Known Allergies

## 2025-01-21 NOTE — PROGRESS NOTES
Pt here for C2D1 KEYTRUDA.  Arrives Ambulating independently, accompanied by Family member     Patient was evaluated today by MD.    Oral medications included in this regimen:  no    Patient confirms comprehension of cancer treatment schedule:  yes    Pregnancy screening:  Not applicable    Modifications in dose or schedule:  No    Medications appearance and physical integrity checked by RN: yes.    Chemotherapy IV pump settings verified by 2 RNs:  No due to targeted therapy IV administration.  Frequency of blood return and site check throughout administration: Prior to administration and At completion of therapy     Infusion/treatment outcome:  patient tolerated treatment without incident    Education Record    Learner:  Patient and Family Member  Barriers / Limitations:  None  Method:  Discussion  Education / instructions given:  PLAN OF CARE  Outcome:  Shows understanding    Discharged Home, Ambulating independently, accompanied by:Family member    Patient/family verbalized understanding of future appointments: by printed AVS

## 2025-01-21 NOTE — TELEPHONE ENCOUNTER
Called daughter to confirm scheduled MRI appointment for 1/28 at [facility] at 3pm. She voiced understanding and thanked me for the call.

## 2025-01-28 ENCOUNTER — HOSPITAL ENCOUNTER (OUTPATIENT)
Dept: MRI IMAGING | Age: 85
Discharge: HOME | End: 2025-01-28
Attending: INTERNAL MEDICINE
Payer: MEDICARE

## 2025-01-28 DIAGNOSIS — C31.9 CANCER OF NASAL CAVITY AND SINUS (HCC): ICD-10-CM

## 2025-01-28 DIAGNOSIS — C30.0 CANCER OF NASAL CAVITY AND SINUS (HCC): ICD-10-CM

## 2025-01-28 PROCEDURE — A9575 INJ GADOTERATE MEGLUMI 0.1ML: HCPCS | Performed by: INTERNAL MEDICINE

## 2025-01-28 PROCEDURE — 70543 MRI ORBT/FAC/NCK W/O &W/DYE: CPT | Performed by: INTERNAL MEDICINE

## 2025-01-28 RX ORDER — DIPHENHYDRAMINE HYDROCHLORIDE 50 MG/ML
10 INJECTION, SOLUTION INTRAMUSCULAR; INTRAVENOUS
Status: COMPLETED | OUTPATIENT
Start: 2025-01-28 | End: 2025-01-28

## 2025-01-28 RX ADMIN — DIPHENHYDRAMINE HYDROCHLORIDE 9 ML: 50 INJECTION, SOLUTION INTRAMUSCULAR; INTRAVENOUS at 15:27:00

## 2025-02-03 ENCOUNTER — TELEPHONE (OUTPATIENT)
Age: 85
End: 2025-02-03

## 2025-02-03 NOTE — TELEPHONE ENCOUNTER
Called daughter Viry, per Dr. Sow, MRI shows cancer left orbit is under control, without much change from prior study, continue same treatment. She thanked me for the call.

## 2025-02-11 ENCOUNTER — OFFICE VISIT (OUTPATIENT)
Age: 85
End: 2025-02-11
Attending: INTERNAL MEDICINE
Payer: MEDICARE

## 2025-02-11 ENCOUNTER — NURSE ONLY (OUTPATIENT)
Age: 85
End: 2025-02-11
Attending: INTERNAL MEDICINE
Payer: MEDICARE

## 2025-02-11 VITALS
OXYGEN SATURATION: 100 % | HEART RATE: 73 BPM | HEIGHT: 56 IN | WEIGHT: 105.19 LBS | BODY MASS INDEX: 23.66 KG/M2 | SYSTOLIC BLOOD PRESSURE: 146 MMHG | DIASTOLIC BLOOD PRESSURE: 78 MMHG | RESPIRATION RATE: 16 BRPM | TEMPERATURE: 98 F

## 2025-02-11 DIAGNOSIS — Z51.81 THERAPEUTIC DRUG MONITORING: ICD-10-CM

## 2025-02-11 DIAGNOSIS — C30.0 CANCER OF NASAL CAVITY AND SINUS (HCC): Primary | ICD-10-CM

## 2025-02-11 DIAGNOSIS — C31.9 CANCER OF NASAL CAVITY AND SINUS (HCC): Primary | ICD-10-CM

## 2025-02-11 DIAGNOSIS — C44.329 SQUAMOUS CELL SKIN CANCER, ORBIT: ICD-10-CM

## 2025-02-11 LAB
ALBUMIN SERPL-MCNC: 4.2 G/DL (ref 3.2–4.8)
ALBUMIN/GLOB SERPL: 1.8 {RATIO} (ref 1–2)
ALP LIVER SERPL-CCNC: 93 U/L
ALT SERPL-CCNC: 17 U/L
ANION GAP SERPL CALC-SCNC: 6 MMOL/L (ref 0–18)
AST SERPL-CCNC: 30 U/L (ref ?–34)
BASOPHILS # BLD AUTO: 0.05 X10(3) UL (ref 0–0.2)
BASOPHILS NFR BLD AUTO: 1.2 %
BILIRUB SERPL-MCNC: 0.6 MG/DL (ref 0.2–1.1)
BUN BLD-MCNC: 15 MG/DL (ref 9–23)
BUN/CREAT SERPL: 15.5 (ref 10–20)
CALCIUM BLD-MCNC: 8.9 MG/DL (ref 8.7–10.4)
CHLORIDE SERPL-SCNC: 98 MMOL/L (ref 98–112)
CO2 SERPL-SCNC: 29 MMOL/L (ref 21–32)
CREAT BLD-MCNC: 0.97 MG/DL
DEPRECATED RDW RBC AUTO: 39.6 FL (ref 35.1–46.3)
EGFRCR SERPLBLD CKD-EPI 2021: 57 ML/MIN/1.73M2 (ref 60–?)
EOSINOPHIL # BLD AUTO: 0.07 X10(3) UL (ref 0–0.7)
EOSINOPHIL NFR BLD AUTO: 1.6 %
ERYTHROCYTE [DISTWIDTH] IN BLOOD BY AUTOMATED COUNT: 13.6 % (ref 11–15)
GLOBULIN PLAS-MCNC: 2.3 G/DL (ref 2–3.5)
GLUCOSE BLD-MCNC: 90 MG/DL (ref 70–99)
HCT VFR BLD AUTO: 35.4 %
HGB BLD-MCNC: 11.9 G/DL
IMM GRANULOCYTES # BLD AUTO: 0.01 X10(3) UL (ref 0–1)
IMM GRANULOCYTES NFR BLD: 0.2 %
LYMPHOCYTES # BLD AUTO: 0.68 X10(3) UL (ref 1–4)
LYMPHOCYTES NFR BLD AUTO: 15.7 %
MCH RBC QN AUTO: 26.7 PG (ref 26–34)
MCHC RBC AUTO-ENTMCNC: 33.6 G/DL (ref 31–37)
MCV RBC AUTO: 79.6 FL
MONOCYTES # BLD AUTO: 0.37 X10(3) UL (ref 0.1–1)
MONOCYTES NFR BLD AUTO: 8.5 %
NEUTROPHILS # BLD AUTO: 3.15 X10 (3) UL (ref 1.5–7.7)
NEUTROPHILS # BLD AUTO: 3.15 X10(3) UL (ref 1.5–7.7)
NEUTROPHILS NFR BLD AUTO: 72.8 %
OSMOLALITY SERPL CALC.SUM OF ELEC: 276 MOSM/KG (ref 275–295)
PLATELET # BLD AUTO: 277 10(3)UL (ref 150–450)
POTASSIUM SERPL-SCNC: 3.6 MMOL/L (ref 3.5–5.1)
PROT SERPL-MCNC: 6.5 G/DL (ref 5.7–8.2)
RBC # BLD AUTO: 4.45 X10(6)UL
SODIUM SERPL-SCNC: 133 MMOL/L (ref 136–145)
T4 FREE SERPL-MCNC: 1.2 NG/DL (ref 0.8–1.7)
TSI SER-ACNC: 1.32 UIU/ML (ref 0.55–4.78)
WBC # BLD AUTO: 4.3 X10(3) UL (ref 4–11)

## 2025-02-11 NOTE — PROGRESS NOTES
[facility] Office Note      Patient Name: Carmen Cox   YOB: 1940   Medical Record Number: [id number]   CSN: [id number]   Consulting Physician: Claudio Sow MD    Reason for Consultation:  locally advanced cancer of the nasopharynx/orbit.    Current Therapy  Pembrolizumab - C3D1 started 12/8/23    INTERVAL HISTORY  Patient returns for follow-up. Since the last visit She has done well.  She tolerated her last dose of pembrolizumab without any major issues.  Diarrhea has improved no skin rash.  No recurrence of epistaxis.    1/28/25 MRI of the face/sinus showed no significant change since prior MRI of October.  No residual or recurrent left orbital mass.  There is mild residual enhancement of the left orbital apex and the left forearm and rotundum that is unchanged.    Patient denies any nausea, vomiting, fevers, chills or pain. Pt also denies any bleeding, specifically hematuria, hematemesis or hemoptysis.    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  Her oncologic history is detailed below.    9/8/23 she initially presented with diplopia.  Workup included an MRI that showed left orbital mass with large caliber perineural invasion V1 and V2 traveling proximally through the superior orbital fissure and pterygopalatine fossa to the Meckel's cave as well as traveling to deuterated middle cranial fossa to the enterocolostomy 80s.  Clinically she had involvement of cranial nerves III V1 and V2.    She was seen by head and neck surgery and was not thought a surgical candidate.  She therefore underwent a biopsy on 10/17/2023.  Pathology from both the left orbital mass and left pterygoid fossa both showed poorly differentiated carcinoma with IHC favoring squamous differentiation.    She met with radiation oncology and was not thought a  candidate for radiation therapy.  She was therefore recommended palliative systemic immunotherapy with pembrolizumab under the care of Dr. Danny Palmer    She was then initiated on pembrolizumab and has been on this treatment for the last year with an excellent response.  Her recent MRI shows near complete resolution of tumor in the medial aspect left orbit.    Unfortunately due to insurance issues she is having to transfer her care to [facility] which is also much closer to her home.  She denies any significant side effects from her pembrolizumab therapy except for occasional intermittent diarrhea.  She has seen gastroenterology scheduled for a colonoscopy in February    Past Oncologic History  [id number], locally advanced, poorly differentiated carcinoma, favor squamous differentiation, L nasal cavity:  9/8/23 - locally advanced nasal cavity tumor per MRI (T4bNx)  10/17/23 - L orbital mass bx -> poorly differentiated carcinoma, favor squamous differentiation   12/8/23 - initiated pembrolizumab (200mg q3wks) with Dr. Danny Palmer at [place]  10/21/24 Most recent imaging was MRI Face which shows complete to near complete resolution of tumor in the medial aspect of the left orbit.  Tumor enhancement orbital apex appears mildly decreased and enhancement of the Meckel's cave and T2/foramen rotundum is unchanged      Past Medical History:  Past Medical History:    Afib (HCC)    Blurry vision, left eye    Cancer (HCC)    Decreased GFR    Essential hypertension    Heart palpitations    Hypertension    Lipid screening    Malaise and fatigue    Onychia of toe of left foot    Left great toe/Depbridement of at least 2/3 toenail    Skin lesions    Syncope and collapse       Past Surgical History:  Past Surgical History:   Procedure Laterality Date    Appendectomy         Family Medical History:  Family History   Problem Relation Age of Onset    Glaucoma Neg     Macular degeneration Neg     Diabetes Neg        Gyne  History:  OB History   No obstetric history on file.       Social History:  Social History     Socioeconomic History    Marital status:      Spouse name: Not on file    Number of children: Not on file    Years of education: Not on file    Highest education level: Not on file   Occupational History    Not on file   Tobacco Use    Smoking status: Never     Passive exposure: Never    Smokeless tobacco: Never   Vaping Use    Vaping status: Never Used   Substance and Sexual Activity    Alcohol use: No     Alcohol/week: 0.0 standard drinks of alcohol    Drug use: No    Sexual activity: Not on file   Other Topics Concern     Service Not Asked    Blood Transfusions Not Asked    Caffeine Concern Yes     Comment: 1 cup coffee daily    Occupational Exposure Not Asked    Hobby Hazards Not Asked    Sleep Concern Not Asked    Stress Concern Not Asked    Weight Concern Not Asked    Special Diet Not Asked    Back Care Not Asked    Exercise Not Asked    Bike Helmet Not Asked    Seat Belt Not Asked    Self-Exams Not Asked    Grew up on a farm Not Asked    History of tanning Not Asked    Outdoor occupation Not Asked    Pt has a pacemaker No    Pt has a defibrillator No    Breast feeding No    Reaction to local anesthetic No   Social History Narrative    Not on file     Social Drivers of Health     Food Insecurity: No Food Insecurity (11/15/2023)    Food Insecurity     Food Insecurity: Never true   Transportation Needs: No Transportation Needs (11/15/2023)    Transportation Needs     Lack of Transportation: No   Housing Stability: Low Risk  (11/15/2023)    Housing Stability     Housing Instability: No     Housing Instability Emergency: Not on file     Crib or Bassinette: Not on file       Allergies:   Allergies[1]    Current Medications:    Current Outpatient Medications:     hydroCHLOROthiazide 25 MG Oral Tab, TAKE 1 TABLET BY MOUTH ONCE DAILY. TAKE AT THE SAME TIME AS LOSARTAN, Disp: , Rfl:     Na Sulfate-K Sulfate-Mg  Sulf (SUPREP BOWEL PREP KIT) 17.5-3.13-1.6 GM/177ML Oral Solution, Take as discussed in clinic, Disp: 1 each, Rfl: 0    metoprolol tartrate 100 MG Oral Tab, Take 1 tablet (100 mg total) by mouth 2 (two) times daily., Disp: , Rfl:     famotidine 20 MG Oral Tab, Take 1 tablet (20 mg total) by mouth 2 (two) times daily before meals., Disp: 180 tablet, Rfl: 3    Cyanocobalamin (VITAMIN B-12) 2500 MCG Sublingual SL Tab, Take 1  Tab under the tongue daily, Disp: 90 tablet, Rfl: 2    gabapentin 300 MG Oral Cap, 1 pill TID, Disp: 270 capsule, Rfl: 3    polypropylene glycol- 0.4-0.3 % Ophthalmic Solution, Place 1 drop into both eyes as needed (Dry eyes)., Disp: , Rfl:     losartan 100 MG Oral Tab, Take 1 tablet (100 mg total) by mouth daily., Disp: 90 tablet, Rfl: 3    rivaroxaban 15 MG Oral Tab, Take 1 tablet (15 mg total) by mouth daily with food., Disp: 30 tablet, Rfl: 0    acetaminophen 500 MG Oral Tab, Take 1 tablet (500 mg total) by mouth every 6 (six) hours as needed for Fever (equal to or greater than 100.4)., Disp: 1 tablet, Rfl: 0    MELATONIN OR, Take 1 tablet by mouth 5 days out of the week.  No weekends, Disp: , Rfl:     Ascorbic Acid (VITAMIN C) 100 MG Oral Tab, Take 1 tablet (100 mg total) by mouth daily., Disp: , Rfl:     Multiple Vitamins-Minerals (MULTIVITAMIN ADULTS 50+) Oral Tab, Take 1 tablet by mouth daily., Disp: , Rfl:     Cholecalciferol (VITAMIN D3) 25 MCG (1000 UT) Oral Cap, Take 1 tablet by mouth daily., Disp: , Rfl:     aspirin 81 MG Oral Tab, Take 1 tablet (81 mg total) by mouth daily., Disp: , Rfl:       Review of Systems:  A comprehensive 14 point review of systems was completed.  Pertinent positives and negatives noted in the the HPI.     Vital Signs:  /78 (BP Location: Left arm, Patient Position: Sitting, Cuff Size: adult)   Pulse 73   Temp 97.7 °F (36.5 °C) (Tympanic)   Resp 16   Ht 1.422 m (4' 8\")   Wt 47.7 kg (105 lb 3.2 oz)   SpO2 100%   BMI 23.59 kg/m²     Physical  Examination:    General: Patient is alert and oriented x 3, not in acute distress.  Psych:  normal mood and affect  HEENT: EOMs intact. Has decreased EOM on th left  Lymphatics: There is no palpable lymphadenopathy throughout in the cervical, supraclavicular or axillary regions.  Chest: Clear to auscultation. No wheezes or rales.  Heart: Regular rate and rhythm. S1S2 normal.  Abdomen: Soft, non tender, no hepatosplenomegaly.  No palpable mass.  Extremities: No edema or calf tenderness.  Neurological: Grossly intact.     Performance Status:    ECOG-1    Labs:    Lab Results   Component Value Date/Time    WBC 4.3 02/11/2025 01:42 PM    RBC 4.45 02/11/2025 01:42 PM    HGB 11.9 (L) 02/11/2025 01:42 PM    HCT 35.4 02/11/2025 01:42 PM    MCV 79.6 (L) 02/11/2025 01:42 PM    MCH 26.7 02/11/2025 01:42 PM    MCHC 33.6 02/11/2025 01:42 PM    RDW 13.6 02/11/2025 01:42 PM    NEPRELIM 3.15 02/11/2025 01:42 PM    .0 02/11/2025 01:42 PM       Lab Results   Component Value Date/Time     (H) 01/21/2025 01:08 PM    BUN 29 (H) 01/21/2025 01:08 PM    CREATSERUM 1.08 (H) 01/21/2025 01:08 PM    GFRNAA 59 (L) 01/04/2022 11:15 AM    CA 9.8 01/21/2025 01:08 PM    ALB 4.2 01/21/2025 01:08 PM     (L) 01/21/2025 01:08 PM    K 4.2 01/21/2025 01:08 PM     01/21/2025 01:08 PM    CO2 28.0 01/21/2025 01:08 PM    ALKPHO 100 01/21/2025 01:08 PM    AST 27 01/21/2025 01:08 PM    ALT 18 01/21/2025 01:08 PM       Imaging:    Reviewed outside imaging    Impression:      ICD-10-CM    1. 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 Therapeutic drug monitoring  Z51.81       3. 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  She had to transfer her care here from [place] due to insurance issues.    I had a  long discussion with patient and her daughter and explained that her immunotherapy treatment is palliative in intent and not curative.  However given the excellent response would expect she will continue to have good disease control.    Plan:  Continue pembrolizumab  Anticipated side effects of this therapy including autoimmune colitis dermatitis endocrinopathies etc. Were previously discussed.  MRI of the orbits/face and sinuses 2/25 shows no evidence of disease progression  She will return for follow-up prior to her next cycle of pembrolizumab.. Discussed that if she wants, can change to q6wks      Emotional Well Being:    Emotional Well Being discussed, patient aware of support systems available through the Cancer Center. No acute issues.     The diagnosis, prognosis, treatment goals, plan for treatment, and expected response was explained to the patient.       Thank you Dr Subhash Lewis MD for the opportunity to participate in the care of this interesting patient. Please do contact me if I may be of any further assistance    Claudio Sow MD  [facility] Hematology/Oncology    High complexity MDM. Our clinic is continuing focal point for all oncologic services the patient needs.         [1] No Known Allergies

## 2025-02-11 NOTE — PROGRESS NOTES
Pt here for C3 D1 of Keytruda.  Arrives Ambulating independently, accompanied by Family member - daughter     Patient was evaluated today by MD.    Oral medications included in this regimen:  no    Patient confirms comprehension of cancer treatment schedule:  yes    Pregnancy screening:  Denies possibility of pregnancy    Modifications in dose or schedule:  No    Medications appearance and physical integrity checked by RN: yes.    Chemotherapy IV pump settings verified by 2 RNs:  No due to targeted therapy IV administration.  Frequency of blood return and site check throughout administration: Prior to administration and At completion of therapy     Infusion/treatment outcome:  patient tolerated treatment without incident    Education Record    Learner:  Patient and Family Member  Barriers / Limitations:  None  Method:  Brief focused and Discussion  Education / instructions given:  POC  Outcome:  Shows understanding    Discharged Home, Ambulating independently, accompanied by:Family member    Patient/family verbalized understanding of future appointments: by PerSay messaging    Discussed changing future appt times to earlier. Message sent to scheduling.

## 2025-02-24 ENCOUNTER — LAB ENCOUNTER (OUTPATIENT)
Dept: LAB | Age: 85
End: 2025-02-24
Attending: INTERNAL MEDICINE
Payer: MEDICARE

## 2025-02-24 DIAGNOSIS — I10 ESSENTIAL HYPERTENSION: ICD-10-CM

## 2025-02-24 DIAGNOSIS — N18.30 STAGE 3 CHRONIC KIDNEY DISEASE, UNSPECIFIED WHETHER STAGE 3A OR 3B CKD (HCC): Chronic | ICD-10-CM

## 2025-02-24 DIAGNOSIS — I48.91 NEW ONSET A-FIB (HCC): ICD-10-CM

## 2025-02-24 LAB
ALBUMIN SERPL-MCNC: 4.2 G/DL (ref 3.2–4.8)
ALBUMIN/GLOB SERPL: 1.8 {RATIO} (ref 1–2)
ALP LIVER SERPL-CCNC: 93 U/L
ALT SERPL-CCNC: 20 U/L
ANION GAP SERPL CALC-SCNC: 7 MMOL/L (ref 0–18)
AST SERPL-CCNC: 31 U/L (ref ?–34)
BASOPHILS # BLD AUTO: 0.07 X10(3) UL (ref 0–0.2)
BASOPHILS NFR BLD AUTO: 1.6 %
BILIRUB SERPL-MCNC: 0.8 MG/DL (ref 0.2–1.1)
BUN BLD-MCNC: 14 MG/DL (ref 9–23)
BUN/CREAT SERPL: 14 (ref 10–20)
CALCIUM BLD-MCNC: 9 MG/DL (ref 8.7–10.4)
CHLORIDE SERPL-SCNC: 99 MMOL/L (ref 98–112)
CHOLEST SERPL-MCNC: 133 MG/DL (ref ?–200)
CO2 SERPL-SCNC: 30 MMOL/L (ref 21–32)
CREAT BLD-MCNC: 1 MG/DL
DEPRECATED RDW RBC AUTO: 41 FL (ref 35.1–46.3)
EGFRCR SERPLBLD CKD-EPI 2021: 55 ML/MIN/1.73M2 (ref 60–?)
EOSINOPHIL # BLD AUTO: 0.12 X10(3) UL (ref 0–0.7)
EOSINOPHIL NFR BLD AUTO: 2.8 %
ERYTHROCYTE [DISTWIDTH] IN BLOOD BY AUTOMATED COUNT: 14 % (ref 11–15)
FASTING PATIENT LIPID ANSWER: YES
FASTING STATUS PATIENT QL REPORTED: YES
GLOBULIN PLAS-MCNC: 2.3 G/DL (ref 2–3.5)
GLUCOSE BLD-MCNC: 91 MG/DL (ref 70–99)
HCT VFR BLD AUTO: 34.1 %
HDLC SERPL-MCNC: 41 MG/DL (ref 40–59)
HGB BLD-MCNC: 11.2 G/DL
IMM GRANULOCYTES # BLD AUTO: 0.01 X10(3) UL (ref 0–1)
IMM GRANULOCYTES NFR BLD: 0.2 %
LDLC SERPL CALC-MCNC: 76 MG/DL (ref ?–100)
LYMPHOCYTES # BLD AUTO: 0.81 X10(3) UL (ref 1–4)
LYMPHOCYTES NFR BLD AUTO: 18.8 %
MCH RBC QN AUTO: 26.8 PG (ref 26–34)
MCHC RBC AUTO-ENTMCNC: 32.8 G/DL (ref 31–37)
MCV RBC AUTO: 81.6 FL
MONOCYTES # BLD AUTO: 0.41 X10(3) UL (ref 0.1–1)
MONOCYTES NFR BLD AUTO: 9.5 %
NEUTROPHILS # BLD AUTO: 2.89 X10 (3) UL (ref 1.5–7.7)
NEUTROPHILS # BLD AUTO: 2.89 X10(3) UL (ref 1.5–7.7)
NEUTROPHILS NFR BLD AUTO: 67.1 %
NONHDLC SERPL-MCNC: 92 MG/DL (ref ?–130)
OSMOLALITY SERPL CALC.SUM OF ELEC: 282 MOSM/KG (ref 275–295)
PLATELET # BLD AUTO: 272 10(3)UL (ref 150–450)
POTASSIUM SERPL-SCNC: 4 MMOL/L (ref 3.5–5.1)
PROT SERPL-MCNC: 6.5 G/DL (ref 5.7–8.2)
RBC # BLD AUTO: 4.18 X10(6)UL
SODIUM SERPL-SCNC: 136 MMOL/L (ref 136–145)
TRIGL SERPL-MCNC: 82 MG/DL (ref 30–149)
TSI SER-ACNC: 2.4 UIU/ML (ref 0.55–4.78)
VIT B12 SERPL-MCNC: >2000 PG/ML (ref 211–911)
VLDLC SERPL CALC-MCNC: 13 MG/DL (ref 0–30)
WBC # BLD AUTO: 4.3 X10(3) UL (ref 4–11)

## 2025-02-24 PROCEDURE — 80053 COMPREHEN METABOLIC PANEL: CPT

## 2025-02-24 PROCEDURE — 36415 COLL VENOUS BLD VENIPUNCTURE: CPT

## 2025-02-24 PROCEDURE — 84443 ASSAY THYROID STIM HORMONE: CPT

## 2025-02-24 PROCEDURE — 80061 LIPID PANEL: CPT

## 2025-02-24 PROCEDURE — 85025 COMPLETE CBC W/AUTO DIFF WBC: CPT

## 2025-02-24 PROCEDURE — 82607 VITAMIN B-12: CPT

## 2025-02-25 ENCOUNTER — OFFICE VISIT (OUTPATIENT)
Dept: NEUROLOGY | Facility: CLINIC | Age: 85
End: 2025-02-25
Payer: MEDICARE

## 2025-02-25 DIAGNOSIS — G50.0 SUPRAORBITAL NEURALGIA: ICD-10-CM

## 2025-02-25 DIAGNOSIS — G25.81 RLS (RESTLESS LEGS SYNDROME): Primary | ICD-10-CM

## 2025-02-25 DIAGNOSIS — R20.2 PARESTHESIA: ICD-10-CM

## 2025-02-25 PROCEDURE — 1159F MED LIST DOCD IN RCRD: CPT | Performed by: OTHER

## 2025-02-25 PROCEDURE — G2211 COMPLEX E/M VISIT ADD ON: HCPCS | Performed by: OTHER

## 2025-02-25 PROCEDURE — 1160F RVW MEDS BY RX/DR IN RCRD: CPT | Performed by: OTHER

## 2025-02-25 PROCEDURE — 99214 OFFICE O/P EST MOD 30 MIN: CPT | Performed by: OTHER

## 2025-02-25 RX ORDER — GABAPENTIN 300 MG/1
CAPSULE ORAL
Qty: 360 CAPSULE | Refills: 3 | Status: SHIPPED | OUTPATIENT
Start: 2025-02-25

## 2025-02-25 RX ORDER — GABAPENTIN 300 MG/1
CAPSULE ORAL
Qty: 360 CAPSULE | Refills: 3 | Status: SHIPPED | OUTPATIENT
Start: 2025-02-25 | End: 2025-02-25

## 2025-02-25 NOTE — PROGRESS NOTES
Neurology follow up Visit     Referred By: Dr. Donnelly ref. provider found    Chief Complaint:   Chief Complaint   Patient presents with    Neurologic Problem     LOV 2/13/2024 Seen for Supraorbital neuralgia.   Patient here today annual follow up for medication management Gabapentin. Denies numbness, hot/cold sensation, tingling, dizziness or nausea.        HPI:     Carmen Cox is a 85 year old female, who presents for paresthesias, restless legs.  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  She eventually went to see doctors and was worked up including MRI of the brain, echocardiogram, Doppler of carotid arteries, MRI of the brain did show significant amount of white matter changes but no acute strokes.  Patient still has some difficulty with managing her blood pressure primarily.  In the meantime she also was found to have atrial fibrillation and therefore she was placed on Xarelto.  She continued with feeling of paresthesias over the left side of the face.  It is moderately bothersome to her.  Patient also has history of restless leg syndrome, especially at night, difficulty falling asleep, ropinirole at 0.25 mg was already helpful.    Left facial symptoms, coupled with diplopia, raised a strong suspicion for small vessel stroke that might have happened back in August 2021.  Patient was appropriately treated with Xarelto, blood pressure medications, LDL was 92.  Goal is below 70.  However her blood pressure remained poorly controlled.  She was addressing it with her primary care doctor or cardiologist.    To help with both her restless leg syndrome paresthesias we stopped ropinirole and instead start her on gabapentin slowly tapering of the dose went on a follow-up appointment in February 2022 she was on 100 milligrams in the morning, 100 mg at noon and 300 mg at night.  And while  her restless leg symptoms were still well controlled, her pain was still quite bothersome especially at night.     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    Dose of gabapentin was increased.  Patient came back for follow-up in August 2022.  Doing slightly better.  Mainly her symptoms of crawling electrical sensation in the left forehead was appearing when she was going to sleep.    Patient came back for follow-up in May 2023.  Continued with facial symmetry on left side, but also facial pain, both forehead, cheek and top of the head on the left side.  It seems that the supraorbital blocks were not helpful and therefore it was repeated again.    Occasional episodes of lightheadedness.    Patient came back for follow-up in August 2023, facial pain was coming back, but she did not want to do injections again even though they were helpful in the past.  She was developing some kind of left eye deviation she is following with an ophthalmologist.  Her restless was getting worse at night.  We increased the dose of gabapentin especially at night.    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  Not surgically repairable, but treatment was started with chemotherapy.    Patient came back for follow-up in February 2024, doing well with tremors of restless legs and paresthesias in her legs as well as supraorbital pain.  Follow-up in February 2025.  The patient reports taking gabapentin 3 times daily . She mentions forgetting the afternoon dose and compensating by taking 2 doses in the evening instead. The patient experiences occasional leg shaking at night, describing it as uncomfortable and requiring frequent position changes.     Additionally,  the patient reports feeling crawling sensation in her skin in the left side of the face.  The patient's tumor is noted to be shrinking but will not completely resolve. Her care has been transferred from [place] to [place], and she is receiving either immunotherapy, with the doctor pleased with her continued progress.      Past Medical History:    Afib (HCC)    Blurry vision, left eye    Cancer (HCC)    Decreased GFR    Essential hypertension    Heart palpitations    Hypertension    Lipid screening    Malaise and fatigue    Onychia of toe of left foot    Left great toe/Depbridement of at least 2/3 toenail    Skin lesions    Syncope and collapse       Past Surgical History:   Procedure Laterality Date    Appendectomy         Social history:  History   Smoking Status    Never   Smokeless Tobacco    Never       History   Alcohol Use No       History   Drug Use No         Family History   Problem Relation Age of Onset    Glaucoma Neg     Macular degeneration Neg     Diabetes Neg          Current Outpatient Medications:     hydroCHLOROthiazide 25 MG Oral Tab, TAKE 1 TABLET BY MOUTH ONCE DAILY. TAKE AT THE SAME TIME AS LOSARTAN, Disp: , Rfl:     Na Sulfate-K Sulfate-Mg Sulf (SUPREP BOWEL PREP KIT) 17.5-3.13-1.6 GM/177ML Oral Solution, Take as discussed in clinic, Disp: 1 each, Rfl: 0    metoprolol tartrate 100 MG Oral Tab, Take 1 tablet (100 mg total) by mouth 2 (two) times daily., Disp: , Rfl:     famotidine 20 MG Oral Tab, Take 1 tablet (20 mg total) by mouth 2 (two) times daily before meals., Disp: 180 tablet, Rfl: 3    Cyanocobalamin (VITAMIN B-12) 2500 MCG Sublingual SL Tab, Take 1  Tab under the tongue daily, Disp: 90 tablet, Rfl: 2    gabapentin 300 MG Oral Cap, 1 pill TID, Disp: 270 capsule, Rfl: 3    polypropylene glycol- 0.4-0.3 % Ophthalmic Solution, Place 1 drop into both eyes as needed (Dry eyes)., Disp: , Rfl:     losartan 100 MG Oral Tab, Take 1 tablet (100 mg total) by mouth daily., Disp: 90 tablet,  Rfl: 3    rivaroxaban 15 MG Oral Tab, Take 1 tablet (15 mg total) by mouth daily with food., Disp: 30 tablet, Rfl: 0    acetaminophen 500 MG Oral Tab, Take 1 tablet (500 mg total) by mouth every 6 (six) hours as needed for Fever (equal to or greater than 100.4)., Disp: 1 tablet, Rfl: 0    MELATONIN OR, Take 1 tablet by mouth 5 days out of the week.  No weekends, Disp: , Rfl:     Ascorbic Acid (VITAMIN C) 100 MG Oral Tab, Take 1 tablet (100 mg total) by mouth daily., Disp: , Rfl:     Multiple Vitamins-Minerals (MULTIVITAMIN ADULTS 50+) Oral Tab, Take 1 tablet by mouth daily., Disp: , Rfl:     Cholecalciferol (VITAMIN D3) 25 MCG (1000 UT) Oral Cap, Take 1 tablet by mouth daily., Disp: , Rfl:     aspirin 81 MG Oral Tab, Take 1 tablet (81 mg total) by mouth daily., Disp: , Rfl:     No Known Allergies    ROS:   As in HPI, the rest of the 14 system review was done and was negative      Physical Exam:  There were no vitals filed for this visit.    General: No apparent distress, well nourished, well groomed.  Head- Normocephalic, atraumatic  Eyes- No redness or swelling  ENT- Hearing intake, normal glutition  Neck- No masses or adenopathy  Cv: pulses were palpable and normal, no cyanosis or edema     Neurological:     Mental Status- Alert and oriented x3.  Normal attention span and concentration  Thought process intact  Memory intact- recent and remote  Mood intact  Fund of knowledge appropriate for education and age    Language intact including: comprehension, naming, repetition, vocabulary    Cranial Nerves:  II.- Visual fields full to confrontation    III, IV, VI- EOM intact, TAY  V. Facial sensation decreased over V1 left-sided distribution  VII. Face symmetric, no facial weakness  VIII. Hearing intact to whisper.  IX. Pallet elevates symmetrically.  XI. Shoulder shrug is intact  XII. Tongue is midline    Motor Exam:  Muscle tone normal  No atrophy or fasciculations  Strength- upper extremities 5/5 proximally and  distally                  - lower  extremities 5/5 proximally and distally    Sensory Exam:  Light touch sensation- intact in all 4 extremities    No clonus  No Babinski sign    Coordination:  Finger to nose intact  Rapid alternating movements intact    Gait:  Normal posture  Normal physiologic      Labs:    Lab Results   Component Value Date    TSH 2.396 02/24/2025     Lab Results   Component Value Date    HDL 41 02/24/2025    LDL 76 02/24/2025    TRIG 82 02/24/2025     Lab Results   Component Value Date    HGB 11.2 (L) 02/24/2025    HCT 34.1 (L) 02/24/2025    MCV 81.6 02/24/2025    WBC 4.3 02/24/2025    .0 02/24/2025      Lab Results   Component Value Date    BUN 14 02/24/2025    CA 9.0 02/24/2025    ALT 20 02/24/2025    AST 31 02/24/2025    ALKPHOS 63 06/25/2015    ALB 4.2 02/24/2025     02/24/2025    K 4.0 02/24/2025    CL 99 02/24/2025    CO2 30.0 02/24/2025      I have reviewed labs.    Imaging Studies:  I have independently reviewed imaging.  MRI of the brain as above        Assessment   1. RLS (restless legs syndrome)  Patient with restless leg syndrome.  Patient will remain on gabapentin since it was helpful with restless leg syndrome symptoms will increase the dose at night specifically.  Some worsening of symptoms at night, therefore the dose will be increased, she will take 1 in the morning, 1, 2 pills at night.  Hopefully will also help with her left facial paresthesias at night as well.    2. Paresthesia  Left facial, coupled with diplopia, diagnosed with malignancy.  Continues her care at [place] with immunotherapy      3. Diplopia  Myasthenia panel was negative           Education and counseling provided to patient. Instructed patient to call my office or seek medical attention immediately if symptoms worsen.  Patient verbalized understanding of information given. All questions were answered. All side effects of drugs were discussed.     Return to clinic in: No follow-ups on  file.    Thaddeus Cassidy MD

## 2025-02-27 NOTE — TELEPHONE ENCOUNTER
1. Have you been to the ER, urgent care clinic since your last visit?  Hospitalized since your last visit?No    2. Have you seen or consulted any other health care providers outside of the [facility] since your last visit?  Include any pap smears or colon screening. No     Patient feels her left eye has been closing and seems to be wandering for about 2 months or maybe longer. She denies double vision while she is wearing her glasses. Patient calling for and appointment sooner than 8/11/23. Dr. Sharl Dubin does not have anything sooner. I will talk to Dr. Sharl Dubin on 7/28/23 to see what she recommends.

## 2025-03-04 ENCOUNTER — APPOINTMENT (OUTPATIENT)
Age: 85
End: 2025-03-04
Attending: INTERNAL MEDICINE
Payer: MEDICARE

## 2025-03-04 ENCOUNTER — OFFICE VISIT (OUTPATIENT)
Age: 85
End: 2025-03-04
Attending: INTERNAL MEDICINE
Payer: MEDICARE

## 2025-03-04 ENCOUNTER — NURSE ONLY (OUTPATIENT)
Age: 85
End: 2025-03-04
Attending: INTERNAL MEDICINE
Payer: MEDICARE

## 2025-03-04 VITALS
OXYGEN SATURATION: 99 % | HEART RATE: 77 BPM | TEMPERATURE: 98 F | WEIGHT: 104.19 LBS | RESPIRATION RATE: 16 BRPM | SYSTOLIC BLOOD PRESSURE: 123 MMHG | DIASTOLIC BLOOD PRESSURE: 78 MMHG | BODY MASS INDEX: 23 KG/M2

## 2025-03-04 DIAGNOSIS — C30.0 CANCER OF NASAL CAVITY AND SINUS (HCC): Primary | ICD-10-CM

## 2025-03-04 DIAGNOSIS — C31.9 CANCER OF NASAL CAVITY AND SINUS (HCC): Primary | ICD-10-CM

## 2025-03-04 LAB
ALBUMIN SERPL-MCNC: 4.1 G/DL (ref 3.2–4.8)
ALBUMIN/GLOB SERPL: 1.6 {RATIO} (ref 1–2)
ALP LIVER SERPL-CCNC: 89 U/L
ALT SERPL-CCNC: 21 U/L
ANION GAP SERPL CALC-SCNC: 8 MMOL/L (ref 0–18)
AST SERPL-CCNC: 34 U/L (ref ?–34)
BASOPHILS # BLD AUTO: 0.07 X10(3) UL (ref 0–0.2)
BASOPHILS NFR BLD AUTO: 1.7 %
BILIRUB SERPL-MCNC: 0.9 MG/DL (ref 0.2–1.1)
BUN BLD-MCNC: 14 MG/DL (ref 9–23)
BUN/CREAT SERPL: 13.9 (ref 10–20)
CALCIUM BLD-MCNC: 9.2 MG/DL (ref 8.7–10.4)
CHLORIDE SERPL-SCNC: 100 MMOL/L (ref 98–112)
CO2 SERPL-SCNC: 28 MMOL/L (ref 21–32)
CREAT BLD-MCNC: 1.01 MG/DL
DEPRECATED RDW RBC AUTO: 42.6 FL (ref 35.1–46.3)
EGFRCR SERPLBLD CKD-EPI 2021: 55 ML/MIN/1.73M2 (ref 60–?)
EOSINOPHIL # BLD AUTO: 0.09 X10(3) UL (ref 0–0.7)
EOSINOPHIL NFR BLD AUTO: 2.1 %
ERYTHROCYTE [DISTWIDTH] IN BLOOD BY AUTOMATED COUNT: 14.6 % (ref 11–15)
GLOBULIN PLAS-MCNC: 2.5 G/DL (ref 2–3.5)
GLUCOSE BLD-MCNC: 108 MG/DL (ref 70–99)
HCT VFR BLD AUTO: 36.8 %
HGB BLD-MCNC: 11.8 G/DL
IMM GRANULOCYTES # BLD AUTO: 0.01 X10(3) UL (ref 0–1)
IMM GRANULOCYTES NFR BLD: 0.2 %
LYMPHOCYTES # BLD AUTO: 0.81 X10(3) UL (ref 1–4)
LYMPHOCYTES NFR BLD AUTO: 19.3 %
MCH RBC QN AUTO: 26 PG (ref 26–34)
MCHC RBC AUTO-ENTMCNC: 32.1 G/DL (ref 31–37)
MCV RBC AUTO: 81.1 FL
MONOCYTES # BLD AUTO: 0.41 X10(3) UL (ref 0.1–1)
MONOCYTES NFR BLD AUTO: 9.8 %
NEUTROPHILS # BLD AUTO: 2.8 X10 (3) UL (ref 1.5–7.7)
NEUTROPHILS # BLD AUTO: 2.8 X10(3) UL (ref 1.5–7.7)
NEUTROPHILS NFR BLD AUTO: 66.9 %
OSMOLALITY SERPL CALC.SUM OF ELEC: 283 MOSM/KG (ref 275–295)
PLATELET # BLD AUTO: 317 10(3)UL (ref 150–450)
POTASSIUM SERPL-SCNC: 3.7 MMOL/L (ref 3.5–5.1)
PROT SERPL-MCNC: 6.6 G/DL (ref 5.7–8.2)
RBC # BLD AUTO: 4.54 X10(6)UL
SODIUM SERPL-SCNC: 136 MMOL/L (ref 136–145)
WBC # BLD AUTO: 4.2 X10(3) UL (ref 4–11)

## 2025-03-04 NOTE — PROGRESS NOTES
Patient arrives to infusion center for C4D1 Keytruda  Arrives Ambulating independently, accompanied by Family member-daughter. Labs reviewed.    Patient was evaluated today by Treatment Nurse.    Oral medications included in this regimen:  no    Patient confirms comprehension of cancer treatment schedule:  yes    Pregnancy screening:  Not applicable    Modifications in dose or schedule:  No    Medications appearance and physical integrity checked by RN: yes.    Chemotherapy IV pump settings verified by 2 RNs:  No due to targeted therapy IV administration.    Frequency of blood return and site check throughout administration: Prior to administration and At completion of therapy     Infusion/treatment outcome:  patient tolerated treatment without incident    Education Record    Learner:  Patient and Family Member  Barriers / Limitations:  None  Method:  Reinforcement  Education / instructions given: Reinforced infusion process such as ordering immunotherapy from pharmacy. Reviewed upcoming appts.  Outcome:  Shows understanding    Discharged Home, Ambulating independently, accompanied by:Family member    Patient/family verbalized understanding of future appointments: by printed AVS

## 2025-03-25 ENCOUNTER — OFFICE VISIT (OUTPATIENT)
Age: 85
End: 2025-03-25
Attending: INTERNAL MEDICINE
Payer: MEDICARE

## 2025-03-25 ENCOUNTER — NURSE ONLY (OUTPATIENT)
Age: 85
End: 2025-03-25
Attending: INTERNAL MEDICINE
Payer: MEDICARE

## 2025-03-25 VITALS
HEIGHT: 56 IN | RESPIRATION RATE: 16 BRPM | TEMPERATURE: 98 F | SYSTOLIC BLOOD PRESSURE: 139 MMHG | DIASTOLIC BLOOD PRESSURE: 83 MMHG | WEIGHT: 102.81 LBS | OXYGEN SATURATION: 99 % | BODY MASS INDEX: 23.13 KG/M2 | HEART RATE: 78 BPM

## 2025-03-25 DIAGNOSIS — Z51.12 ENCOUNTER FOR ANTINEOPLASTIC IMMUNOTHERAPY: ICD-10-CM

## 2025-03-25 DIAGNOSIS — C30.0 CANCER OF NASAL CAVITY AND SINUS (HCC): Primary | ICD-10-CM

## 2025-03-25 DIAGNOSIS — C31.9 CANCER OF NASAL CAVITY AND SINUS (HCC): Primary | ICD-10-CM

## 2025-03-25 DIAGNOSIS — H04.203 BILATERAL EPIPHORA: ICD-10-CM

## 2025-03-25 LAB
ALBUMIN SERPL-MCNC: 4.4 G/DL (ref 3.2–4.8)
ALBUMIN/GLOB SERPL: 1.7 {RATIO} (ref 1–2)
ALP LIVER SERPL-CCNC: 99 U/L
ALT SERPL-CCNC: 19 U/L
ANION GAP SERPL CALC-SCNC: 7 MMOL/L (ref 0–18)
AST SERPL-CCNC: 33 U/L (ref ?–34)
BASOPHILS # BLD AUTO: 0.06 X10(3) UL (ref 0–0.2)
BASOPHILS NFR BLD AUTO: 1.1 %
BILIRUB SERPL-MCNC: 1 MG/DL (ref 0.2–1.1)
BUN BLD-MCNC: 16 MG/DL (ref 9–23)
BUN/CREAT SERPL: 14.7 (ref 10–20)
CALCIUM BLD-MCNC: 9.4 MG/DL (ref 8.7–10.4)
CHLORIDE SERPL-SCNC: 101 MMOL/L (ref 98–112)
CO2 SERPL-SCNC: 28 MMOL/L (ref 21–32)
CREAT BLD-MCNC: 1.09 MG/DL
DEPRECATED RDW RBC AUTO: 45 FL (ref 35.1–46.3)
EGFRCR SERPLBLD CKD-EPI 2021: 50 ML/MIN/1.73M2 (ref 60–?)
EOSINOPHIL # BLD AUTO: 0.12 X10(3) UL (ref 0–0.7)
EOSINOPHIL NFR BLD AUTO: 2.1 %
ERYTHROCYTE [DISTWIDTH] IN BLOOD BY AUTOMATED COUNT: 15.2 % (ref 11–15)
GLOBULIN PLAS-MCNC: 2.6 G/DL (ref 2–3.5)
GLUCOSE BLD-MCNC: 83 MG/DL (ref 70–99)
HCT VFR BLD AUTO: 37.5 %
HGB BLD-MCNC: 12.5 G/DL
IMM GRANULOCYTES # BLD AUTO: 0.02 X10(3) UL (ref 0–1)
IMM GRANULOCYTES NFR BLD: 0.4 %
LYMPHOCYTES # BLD AUTO: 0.73 X10(3) UL (ref 1–4)
LYMPHOCYTES NFR BLD AUTO: 12.9 %
MCH RBC QN AUTO: 27.1 PG (ref 26–34)
MCHC RBC AUTO-ENTMCNC: 33.3 G/DL (ref 31–37)
MCV RBC AUTO: 81.2 FL
MONOCYTES # BLD AUTO: 0.55 X10(3) UL (ref 0.1–1)
MONOCYTES NFR BLD AUTO: 9.7 %
NEUTROPHILS # BLD AUTO: 4.17 X10 (3) UL (ref 1.5–7.7)
NEUTROPHILS # BLD AUTO: 4.17 X10(3) UL (ref 1.5–7.7)
NEUTROPHILS NFR BLD AUTO: 73.8 %
OSMOLALITY SERPL CALC.SUM OF ELEC: 282 MOSM/KG (ref 275–295)
PLATELET # BLD AUTO: 315 10(3)UL (ref 150–450)
POTASSIUM SERPL-SCNC: 4 MMOL/L (ref 3.5–5.1)
PROT SERPL-MCNC: 7 G/DL (ref 5.7–8.2)
RBC # BLD AUTO: 4.62 X10(6)UL
SODIUM SERPL-SCNC: 136 MMOL/L (ref 136–145)
T4 FREE SERPL-MCNC: 1.2 NG/DL (ref 0.8–1.7)
TSI SER-ACNC: 1.26 UIU/ML (ref 0.55–4.78)
WBC # BLD AUTO: 5.7 X10(3) UL (ref 4–11)

## 2025-03-25 NOTE — PROGRESS NOTES
[facility] Office Note      Patient Name: Carmen Cox   YOB: 1940   Medical Record Number: [id number]   CSN: [id number]   Consulting Physician: Claudio Sow MD    Reason for Consultation:  locally advanced cancer of the nasopharynx/orbit.    Current Therapy  Pembrolizumab - C5D1 today;  started 12/8/23      INTERVAL HISTORY  Patient is here for a follow up visit, presents with her daughter, for consideration of next cycle of immunotherapy    Patient is overall doing well. She denies diarrhea, rash, change in breathing. Pt does report 3-4 days of watery eyes, family thinks it may be due to her spending more time outside recently gardening.  Denies vision change or headache. No other acute complaints.    Denies headache, weight loss, fever/chills, sore throat, worsening SOB/MCKEON, chest pain, cough, N/V/D/C, and swelling.  Denies pain.     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  Her oncologic history is detailed below.    9/8/23 she initially presented with diplopia.  Workup included an MRI that showed left orbital mass with large caliber perineural invasion V1 and V2 traveling proximally through the superior orbital fissure and pterygopalatine fossa to the Meckel's cave as well as traveling to deuterated middle cranial fossa to the enterocolostomy 80s.  Clinically she had involvement of cranial nerves III V1 and V2.    She was seen by head and neck surgery and was not thought a surgical candidate.  She therefore underwent a biopsy on 10/17/2023.  Pathology from both the left orbital mass and left pterygoid fossa both showed poorly differentiated carcinoma with IHC favoring squamous differentiation.    She met with radiation oncology and was not thought a candidate for radiation therapy.  She was therefore recommended palliative  systemic immunotherapy with pembrolizumab under the care of Dr. Danny Palmer    She was then initiated on pembrolizumab and has been on this treatment for the last year with an excellent response.  Her recent MRI shows near complete resolution of tumor in the medial aspect left orbit.    Unfortunately due to insurance issues she is having to transfer her care to [facility] which is also much closer to her home.  She denies any significant side effects from her pembrolizumab therapy except for occasional intermittent diarrhea.  She has seen gastroenterology, planned  for a colonoscopy in February    Past Oncologic History  [id number], locally advanced, poorly differentiated carcinoma, favor squamous differentiation, L nasal cavity:  9/8/23 - locally advanced nasal cavity tumor per MRI ([id number])  10/17/23 - L orbital mass bx -> poorly differentiated carcinoma, favor squamous differentiation   12/8/23 - initiated pembrolizumab (200mg q3wks) with Dr. Danny Palmer at [place]  10/21/24 Most recent imaging was MRI Face which shows complete to near complete resolution of tumor in the medial aspect of the left orbit.  Tumor enhancement orbital apex appears mildly decreased and enhancement of the Meckel's cave and T2/foramen rotundum is unchanged  1/28/25- MRI Face/Sinus stable compared to October 2024    Past Medical History:  Past Medical History:    Afib (HCC)    Blurry vision, left eye    Cancer (HCC)    Decreased GFR    Essential hypertension    Heart palpitations    Hypertension    Lipid screening    Malaise and fatigue    Onychia of toe of left foot    Left great toe/Depbridement of at least 2/3 toenail    Skin lesions    Syncope and collapse       Past Surgical History:  Past Surgical History:   Procedure Laterality Date    Appendectomy         Family Medical History:  Family History   Problem Relation Age of Onset    Glaucoma Neg     Macular degeneration Neg     Diabetes Neg        Gyne History:  OB History   No  obstetric history on file.       Social History:  Social History     Socioeconomic History    Marital status:      Spouse name: Not on file    Number of children: Not on file    Years of education: Not on file    Highest education level: Not on file   Occupational History    Not on file   Tobacco Use    Smoking status: Never     Passive exposure: Never    Smokeless tobacco: Never   Vaping Use    Vaping status: Never Used   Substance and Sexual Activity    Alcohol use: No     Alcohol/week: 0.0 standard drinks of alcohol    Drug use: No    Sexual activity: Not on file   Other Topics Concern     Service Not Asked    Blood Transfusions Not Asked    Caffeine Concern Yes     Comment: 1 cup coffee daily    Occupational Exposure Not Asked    Hobby Hazards Not Asked    Sleep Concern Not Asked    Stress Concern Not Asked    Weight Concern Not Asked    Special Diet Not Asked    Back Care Not Asked    Exercise Not Asked    Bike Helmet Not Asked    Seat Belt Not Asked    Self-Exams Not Asked    Grew up on a farm Not Asked    History of tanning Not Asked    Outdoor occupation Not Asked    Pt has a pacemaker No    Pt has a defibrillator No    Breast feeding No    Reaction to local anesthetic No   Social History Narrative    Not on file     Social Drivers of Health     Food Insecurity: No Food Insecurity (11/15/2023)    Food Insecurity     Food Insecurity: Never true   Transportation Needs: No Transportation Needs (11/15/2023)    Transportation Needs     Lack of Transportation: No   Housing Stability: Low Risk  (11/15/2023)    Housing Stability     Housing Instability: No     Housing Instability Emergency: Not on file     Crib or Bassinette: Not on file       Allergies:   Allergies[1]    Current Medications:    Current Outpatient Medications:     gabapentin 300 MG Oral Cap, 1 pill in am and noon and 2 pills at bedtime, Disp: 360 capsule, Rfl: 3    hydroCHLOROthiazide 25 MG Oral Tab, TAKE 1 TABLET BY MOUTH ONCE DAILY.  TAKE AT THE SAME TIME AS LOSARTAN, Disp: , Rfl:     Na Sulfate-K Sulfate-Mg Sulf (SUPREP BOWEL PREP KIT) 17.5-3.13-1.6 GM/177ML Oral Solution, Take as discussed in clinic, Disp: 1 each, Rfl: 0    metoprolol tartrate 100 MG Oral Tab, Take 1 tablet (100 mg total) by mouth 2 (two) times daily., Disp: , Rfl:     famotidine 20 MG Oral Tab, Take 1 tablet (20 mg total) by mouth 2 (two) times daily before meals., Disp: 180 tablet, Rfl: 3    Cyanocobalamin (VITAMIN B-12) 2500 MCG Sublingual SL Tab, Take 1  Tab under the tongue daily, Disp: 90 tablet, Rfl: 2    polypropylene glycol- 0.4-0.3 % Ophthalmic Solution, Place 1 drop into both eyes as needed (Dry eyes)., Disp: , Rfl:     losartan 100 MG Oral Tab, Take 1 tablet (100 mg total) by mouth daily., Disp: 90 tablet, Rfl: 3    rivaroxaban 15 MG Oral Tab, Take 1 tablet (15 mg total) by mouth daily with food., Disp: 30 tablet, Rfl: 0    acetaminophen 500 MG Oral Tab, Take 1 tablet (500 mg total) by mouth every 6 (six) hours as needed for Fever (equal to or greater than 100.4)., Disp: 1 tablet, Rfl: 0    MELATONIN OR, Take 1 tablet by mouth 5 days out of the week.  No weekends, Disp: , Rfl:     Ascorbic Acid (VITAMIN C) 100 MG Oral Tab, Take 1 tablet (100 mg total) by mouth daily., Disp: , Rfl:     Multiple Vitamins-Minerals (MULTIVITAMIN ADULTS 50+) Oral Tab, Take 1 tablet by mouth daily., Disp: , Rfl:     Cholecalciferol (VITAMIN D3) 25 MCG (1000 UT) Oral Cap, Take 1 tablet by mouth daily., Disp: , Rfl:     aspirin 81 MG Oral Tab, Take 1 tablet (81 mg total) by mouth daily., Disp: , Rfl:       Review of Systems:  A comprehensive 14 point review of systems was completed.  Pertinent positives and negatives noted in the the HPI.     Vital Signs:  /83 (BP Location: Left arm, Patient Position: Sitting, Cuff Size: adult)   Pulse 78   Temp 98.1 °F (36.7 °C) (Oral)   Resp 16   Ht 1.422 m (4' 8\")   Wt 46.6 kg (102 lb 12.8 oz)   SpO2 99%   BMI 23.05 kg/m²      Physical Examination:    General: Patient is alert and oriented x 3, not in acute distress.  Psych:  normal mood and affect  HEENT: EOMs intact. Has decreased EOM on the left.  Lymphatics: There is no palpable lymphadenopathy  Chest: Clear to auscultation. No wheezes or rales.  Heart: Regular rate and rhythm. S1S2 normal.  Abdomen: Soft, non tender, no hepatosplenomegaly.  No palpable mass.  Extremities: No BLE edema   Neurological: Grossly intact.     Performance Status:    ECOG-1    Labs:    Lab Results   Component Value Date/Time    WBC 5.7 03/25/2025 11:48 AM    RBC 4.62 03/25/2025 11:48 AM    HGB 12.5 03/25/2025 11:48 AM    HCT 37.5 03/25/2025 11:48 AM    MCV 81.2 03/25/2025 11:48 AM    MCH 27.1 03/25/2025 11:48 AM    MCHC 33.3 03/25/2025 11:48 AM    RDW 15.2 (H) 03/25/2025 11:48 AM    NEPRELIM 4.17 03/25/2025 11:48 AM    .0 03/25/2025 11:48 AM       Lab Results   Component Value Date/Time    GLU 83 03/25/2025 11:48 AM    BUN 16 03/25/2025 11:48 AM    CREATSERUM 1.09 (H) 03/25/2025 11:48 AM    GFRNAA 59 (L) 01/04/2022 11:15 AM    CA 9.4 03/25/2025 11:48 AM    ALB 4.4 03/25/2025 11:48 AM     03/25/2025 11:48 AM    K 4.0 03/25/2025 11:48 AM     03/25/2025 11:48 AM    CO2 28.0 03/25/2025 11:48 AM    ALKPHO 99 03/25/2025 11:48 AM    AST 33 03/25/2025 11:48 AM    ALT 19 03/25/2025 11:48 AM       Imaging:    Reviewed outside imaging    Impression:    84-year-old female with 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  She had to transfer her care here from [place] due to insurance issues.    I had a long discussion with patient and her daughter and explained that her immunotherapy treatment is palliative in intent and not curative.  However given the excellent response would expect she will continue to have good disease control.    Plan:  Continue  pembrolizumab, proceed with C5 today  Anticipated side effects of this therapy including autoimmune colitis dermatitis endocrinopathies etc. Were previously discussed.  MRI of the orbits/face and sinuses 1/28/25 shows no evidence of disease progression  She will return for follow-up prior to her next cycle of pembrolizumab. Discussed that if she wants, can change to q6wks but prefers every 3 weeks for now  Recommend loratidine or other H1 antagonist for hyperlacrimation      Emotional Well Being:    Emotional Well Being discussed, patient aware of support systems available through the Cancer Center. No acute issues.     The diagnosis, prognosis, treatment goals, plan for treatment, and expected response was explained to the patient.       REINIER Diehl  Hematology/Oncology         High complexity MDM. Our clinic is continuing focal point for all oncologic services the patient needs.         [1] No Known Allergies

## 2025-03-25 NOTE — PROGRESS NOTES
Patient arrives to infusion center for C5D1 Keytruda      Arrives Ambulating independently, accompanied by Family member-daughter. Labs reviewed.    Patient was evaluated today by Treatment Nurse.    Oral medications included in this regimen:  no    Patient confirms comprehension of cancer treatment schedule:  yes    Pregnancy screening:  Not applicable    Modifications in dose or schedule:  No    Medications appearance and physical integrity checked by RN: yes.    Chemotherapy IV pump settings verified by 2 RNs:  No due to targeted therapy IV administration.    Frequency of blood return and site check throughout administration: Prior to administration and At completion of therapy     Infusion/treatment outcome:  patient tolerated treatment without incident    Education Record    Learner:  Patient and Family Member  Barriers / Limitations:  None  Method:  Reinforcement  Education / instructions given: Reinforced infusion process such as ordering immunotherapy from pharmacy. Reviewed upcoming appts.  Outcome:  Shows understanding    Discharged Home, Ambulating independently, accompanied by:Family member    Patient/family verbalized understanding of future appointments: by printed AVS

## 2025-04-15 ENCOUNTER — OFFICE VISIT (OUTPATIENT)
Age: 85
End: 2025-04-15
Attending: INTERNAL MEDICINE
Payer: MEDICARE

## 2025-04-15 ENCOUNTER — NURSE ONLY (OUTPATIENT)
Age: 85
End: 2025-04-15
Attending: INTERNAL MEDICINE
Payer: MEDICARE

## 2025-04-15 VITALS
HEIGHT: 56 IN | SYSTOLIC BLOOD PRESSURE: 162 MMHG | BODY MASS INDEX: 23.62 KG/M2 | RESPIRATION RATE: 16 BRPM | HEART RATE: 79 BPM | TEMPERATURE: 98 F | OXYGEN SATURATION: 98 % | DIASTOLIC BLOOD PRESSURE: 87 MMHG | WEIGHT: 105 LBS

## 2025-04-15 DIAGNOSIS — C30.0 CANCER OF NASAL CAVITY AND SINUS (HCC): Primary | ICD-10-CM

## 2025-04-15 DIAGNOSIS — Z51.12 ENCOUNTER FOR ANTINEOPLASTIC IMMUNOTHERAPY: ICD-10-CM

## 2025-04-15 DIAGNOSIS — C31.9 CANCER OF NASAL CAVITY AND SINUS (HCC): Primary | ICD-10-CM

## 2025-04-15 DIAGNOSIS — N28.9 RENAL DYSFUNCTION: ICD-10-CM

## 2025-04-15 DIAGNOSIS — Z51.81 THERAPEUTIC DRUG MONITORING: ICD-10-CM

## 2025-04-15 LAB
ALBUMIN SERPL-MCNC: 4.2 G/DL (ref 3.2–4.8)
ALBUMIN/GLOB SERPL: 2 {RATIO} (ref 1–2)
ALP LIVER SERPL-CCNC: 102 U/L (ref 55–142)
ALT SERPL-CCNC: 20 U/L (ref 10–49)
ANION GAP SERPL CALC-SCNC: 7 MMOL/L (ref 0–18)
AST SERPL-CCNC: 34 U/L (ref ?–34)
BASOPHILS # BLD AUTO: 0.06 X10(3) UL (ref 0–0.2)
BASOPHILS NFR BLD AUTO: 1.1 %
BILIRUB SERPL-MCNC: 0.8 MG/DL (ref 0.2–1.1)
BUN BLD-MCNC: 17 MG/DL (ref 9–23)
BUN/CREAT SERPL: 13.1 (ref 10–20)
CALCIUM BLD-MCNC: 9.2 MG/DL (ref 8.7–10.4)
CHLORIDE SERPL-SCNC: 102 MMOL/L (ref 98–112)
CO2 SERPL-SCNC: 29 MMOL/L (ref 21–32)
CREAT BLD-MCNC: 1.3 MG/DL (ref 0.55–1.02)
DEPRECATED RDW RBC AUTO: 45.7 FL (ref 35.1–46.3)
EGFRCR SERPLBLD CKD-EPI 2021: 40 ML/MIN/1.73M2 (ref 60–?)
EOSINOPHIL # BLD AUTO: 0.08 X10(3) UL (ref 0–0.7)
EOSINOPHIL NFR BLD AUTO: 1.4 %
ERYTHROCYTE [DISTWIDTH] IN BLOOD BY AUTOMATED COUNT: 15.4 % (ref 11–15)
GLOBULIN PLAS-MCNC: 2.1 G/DL (ref 2–3.5)
GLUCOSE BLD-MCNC: 85 MG/DL (ref 70–99)
HCT VFR BLD AUTO: 38.3 % (ref 35–48)
HGB BLD-MCNC: 12.3 G/DL (ref 12–16)
IMM GRANULOCYTES # BLD AUTO: 0.02 X10(3) UL (ref 0–1)
IMM GRANULOCYTES NFR BLD: 0.4 %
LYMPHOCYTES # BLD AUTO: 0.73 X10(3) UL (ref 1–4)
LYMPHOCYTES NFR BLD AUTO: 12.8 %
MCH RBC QN AUTO: 25.9 PG (ref 26–34)
MCHC RBC AUTO-ENTMCNC: 32.1 G/DL (ref 31–37)
MCV RBC AUTO: 80.8 FL (ref 80–100)
MONOCYTES # BLD AUTO: 0.59 X10(3) UL (ref 0.1–1)
MONOCYTES NFR BLD AUTO: 10.4 %
NEUTROPHILS # BLD AUTO: 4.22 X10 (3) UL (ref 1.5–7.7)
NEUTROPHILS # BLD AUTO: 4.22 X10(3) UL (ref 1.5–7.7)
NEUTROPHILS NFR BLD AUTO: 73.9 %
OSMOLALITY SERPL CALC.SUM OF ELEC: 287 MOSM/KG (ref 275–295)
PLATELET # BLD AUTO: 294 10(3)UL (ref 150–450)
POTASSIUM SERPL-SCNC: 3.8 MMOL/L (ref 3.5–5.1)
PROT SERPL-MCNC: 6.3 G/DL (ref 5.7–8.2)
RBC # BLD AUTO: 4.74 X10(6)UL (ref 3.8–5.3)
SODIUM SERPL-SCNC: 138 MMOL/L (ref 136–145)
WBC # BLD AUTO: 5.7 X10(3) UL (ref 4–11)

## 2025-04-15 NOTE — PROGRESS NOTES
[facility] Office Note      Patient Name: Carmen Cox   YOB: 1940   Medical Record Number: [id number]   CSN: [id number]   Consulting Physician: Claudio Sow MD    Reason for Consultation:  locally advanced cancer of the nasopharynx/orbit.    Current Therapy  Pembrolizumab - C6D1 started 4/15/24    INTERVAL HISTORY  Patient returns for follow-up. Since the last visit She has done well.  She tolerated her last dose of pembrolizumab without any major issues.  Diarrhea has improved \"stress induced\" no skin rash.  No recurrence of epistaxis.    Patient denies any nausea, vomiting, fevers, chills or pain. Pt also denies any bleeding, specifically hematuria, hematemesis or hemoptysis.    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  Her oncologic history is detailed below.    9/8/23 she initially presented with diplopia.  Workup included an MRI that showed left orbital mass with large caliber perineural invasion V1 and V2 traveling proximally through the superior orbital fissure and pterygopalatine fossa to the Meckel's cave as well as traveling to deuterated middle cranial fossa to the enterocolostomy 80s.  Clinically she had involvement of cranial nerves III V1 and V2.    She was seen by head and neck surgery and was not thought a surgical candidate.  She therefore underwent a biopsy on 10/17/2023.  Pathology from both the left orbital mass and left pterygoid fossa both showed poorly differentiated carcinoma with IHC favoring squamous differentiation.    She met with radiation oncology and was not thought a candidate for radiation therapy.  She was therefore recommended palliative systemic immunotherapy with pembrolizumab under the care of Dr. Danny Palmer    She was then initiated on pembrolizumab and has been on this treatment for the last  year with an excellent response.  Her recent MRI shows near complete resolution of tumor in the medial aspect left orbit.    Unfortunately due to insurance issues she is having to transfer her care to [facility] which is also much closer to her home.  She denies any significant side effects from her pembrolizumab therapy except for occasional intermittent diarrhea.     1/28/25 MRI of the face/sinus showed no significant change since prior MRI of October.  No residual or recurrent left orbital mass.  There is mild residual enhancement of the left orbital apex and the left forearm and rotundum that is unchanged.    Past Oncologic History  A0hCpHm, locally advanced, poorly differentiated carcinoma, favor squamous differentiation, L nasal cavity:  9/8/23 - locally advanced nasal cavity tumor per MRI ([id number])  10/17/23 - L orbital mass bx -> poorly differentiated carcinoma, favor squamous differentiation   12/8/23 - initiated pembrolizumab (200mg q3wks) with Dr. Danny Palmer at [place]  10/21/24 Most recent imaging was MRI Face which shows complete to near complete resolution of tumor in the medial aspect of the left orbit.  Tumor enhancement orbital apex appears mildly decreased and enhancement of the Meckel's cave and T2/foramen rotundum is unchanged      Past Medical History:  Past Medical History:    Afib (HCC)    Blurry vision, left eye    Cancer (HCC)    Decreased GFR    Essential hypertension    Heart palpitations    Hypertension    Lipid screening    Malaise and fatigue    Onychia of toe of left foot    Left great toe/Depbridement of at least 2/3 toenail    Skin lesions    Syncope and collapse       Past Surgical History:  Past Surgical History:   Procedure Laterality Date    Appendectomy         Family Medical History:  Family History   Problem Relation Age of Onset    Glaucoma Neg     Macular degeneration Neg     Diabetes Neg        Gyne History:  OB History   No obstetric history on file.       Social  History:  Social History     Socioeconomic History    Marital status:      Spouse name: Not on file    Number of children: Not on file    Years of education: Not on file    Highest education level: Not on file   Occupational History    Not on file   Tobacco Use    Smoking status: Never     Passive exposure: Never    Smokeless tobacco: Never   Vaping Use    Vaping status: Never Used   Substance and Sexual Activity    Alcohol use: No     Alcohol/week: 0.0 standard drinks of alcohol    Drug use: No    Sexual activity: Not on file   Other Topics Concern     Service Not Asked    Blood Transfusions Not Asked    Caffeine Concern Yes     Comment: 1 cup coffee daily    Occupational Exposure Not Asked    Hobby Hazards Not Asked    Sleep Concern Not Asked    Stress Concern Not Asked    Weight Concern Not Asked    Special Diet Not Asked    Back Care Not Asked    Exercise Not Asked    Bike Helmet Not Asked    Seat Belt Not Asked    Self-Exams Not Asked    Grew up on a farm Not Asked    History of tanning Not Asked    Outdoor occupation Not Asked    Pt has a pacemaker No    Pt has a defibrillator No    Breast feeding No    Reaction to local anesthetic No   Social History Narrative    Not on file     Social Drivers of Health     Food Insecurity: No Food Insecurity (11/15/2023)    Food Insecurity     Food Insecurity: Never true   Transportation Needs: No Transportation Needs (11/15/2023)    Transportation Needs     Lack of Transportation: No   Housing Stability: Low Risk  (11/15/2023)    Housing Stability     Housing Instability: No     Housing Instability Emergency: Not on file     Crib or Bassinette: Not on file       Allergies:   Allergies[1]    Current Medications:    Current Outpatient Medications:     gabapentin 300 MG Oral Cap, 1 pill in am and noon and 2 pills at bedtime, Disp: 360 capsule, Rfl: 3    hydroCHLOROthiazide 25 MG Oral Tab, , Disp: , Rfl:     Na Sulfate-K Sulfate-Mg Sulf (SUPREP BOWEL PREP KIT)  17.5-3.13-1.6 GM/177ML Oral Solution, Take as discussed in clinic, Disp: 1 each, Rfl: 0    metoprolol tartrate 100 MG Oral Tab, Take 1 tablet (100 mg total) by mouth in the morning and 1 tablet (100 mg total) before bedtime., Disp: , Rfl:     famotidine 20 MG Oral Tab, Take 1 tablet (20 mg total) by mouth 2 (two) times daily before meals., Disp: 180 tablet, Rfl: 3    Cyanocobalamin (VITAMIN B-12) 2500 MCG Sublingual SL Tab, Take 1  Tab under the tongue daily, Disp: 90 tablet, Rfl: 2    polypropylene glycol- 0.4-0.3 % Ophthalmic Solution, Place 1 drop into both eyes as needed (Dry eyes)., Disp: , Rfl:     losartan 100 MG Oral Tab, Take 1 tablet (100 mg total) by mouth daily., Disp: 90 tablet, Rfl: 3    rivaroxaban 15 MG Oral Tab, Take 1 tablet (15 mg total) by mouth daily with food., Disp: 30 tablet, Rfl: 0    acetaminophen 500 MG Oral Tab, Take 1 tablet (500 mg total) by mouth every 6 (six) hours as needed for Fever (equal to or greater than 100.4)., Disp: 1 tablet, Rfl: 0    MELATONIN OR, Take 1 tablet by mouth 5 days out of the week.  No weekends, Disp: , Rfl:     Ascorbic Acid (VITAMIN C) 100 MG Oral Tab, Take 1 tablet (100 mg total) by mouth in the morning., Disp: , Rfl:     Multiple Vitamins-Minerals (MULTIVITAMIN ADULTS 50+) Oral Tab, Take 1 tablet by mouth in the morning., Disp: , Rfl:     Cholecalciferol (VITAMIN D3) 25 MCG (1000 UT) Oral Cap, Take 1 tablet by mouth in the morning., Disp: , Rfl:     aspirin 81 MG Oral Tab, Take 1 tablet (81 mg total) by mouth in the morning., Disp: , Rfl:       Review of Systems:  A comprehensive 14 point review of systems was completed.  Pertinent positives and negatives noted in the the HPI.     Vital Signs:  BP (!) 162/87 (BP Location: Left arm, Patient Position: Sitting, Cuff Size: adult)   Pulse 79   Temp 98.1 °F (36.7 °C) (Oral)   Resp 16   Ht 1.422 m (4' 8\")   Wt 47.6 kg (105 lb)   SpO2 98%   BMI 23.54 kg/m²     Physical Examination:    General: Patient  is alert and oriented x 3, not in acute distress.  Psych:  normal mood and affect  HEENT: EOMs intact. Has decreased EOM on th left  Lymphatics: There is no palpable lymphadenopathy throughout in the cervical, supraclavicular or axillary regions.  Chest: Clear to auscultation. No wheezes or rales.  Heart: Regular rate and rhythm. S1S2 normal.  Extremities: No edema or calf tenderness.  Neurological: Grossly intact.     Performance Status:    ECOG-1    Labs:    Lab Results   Component Value Date/Time    WBC 5.7 04/15/2025 12:00 PM    RBC 4.74 04/15/2025 12:00 PM    HGB 12.3 04/15/2025 12:00 PM    HCT 38.3 04/15/2025 12:00 PM    MCV 80.8 04/15/2025 12:00 PM    MCH 25.9 (L) 04/15/2025 12:00 PM    MCHC 32.1 04/15/2025 12:00 PM    RDW 15.4 (H) 04/15/2025 12:00 PM    NEPRELIM 4.22 04/15/2025 12:00 PM    .0 04/15/2025 12:00 PM       Lab Results   Component Value Date/Time    GLU 85 04/15/2025 12:00 PM    BUN 17 04/15/2025 12:00 PM    CREATSERUM 1.30 (H) 04/15/2025 12:00 PM    GFRNAA 59 (L) 01/04/2022 11:15 AM    CA 9.2 04/15/2025 12:00 PM    ALB 4.2 04/15/2025 12:00 PM     04/15/2025 12:00 PM    K 3.8 04/15/2025 12:00 PM     04/15/2025 12:00 PM    CO2 29.0 04/15/2025 12:00 PM    ALKPHO 102 04/15/2025 12:00 PM    AST 34 (H) 04/15/2025 12:00 PM    ALT 20 04/15/2025 12:00 PM       Imaging:    Reviewed outside imaging    Impression:      ICD-10-CM    1. 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 Encounter for antineoplastic immunotherapy  Z51.12       3. Therapeutic drug monitoring  Z51.81       4. 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  She had to transfer her care here from [place] due to insurance issues.    I had a long discussion with patient and her daughter and explained that her  immunotherapy treatment is palliative in intent and not curative.  However given the excellent response would expect she will continue to have good disease control.    Plan:  Continue pembrolizumab  Anticipated side effects of this therapy including autoimmune colitis dermatitis endocrinopathies etc. Were previously discussed.  MRI of the orbits/face and sinuses 2/25 shows no evidence of disease progression, repeat in June  She will return for follow-up prior to her next cycle of pembrolizumab..   Mildly elevated creatine: hold hydrochlorothiazide and repeat in 1 wk, if still elevated, will need to see her PCP/nephrology  Emotional Well Being:    Emotional Well Being discussed, patient aware of support systems available through the Cancer Center. No acute issues.     The diagnosis, prognosis, treatment goals, plan for treatment, and expected response was explained to the patient.       Thank you Dr Subhash Lewis MD for the opportunity to participate in the care of this interesting patient. Please do contact me if I may be of any further assistance    Claudio Sow MD  [facility]emat[facility]    High complexity MDM. Our clinic is continuing focal point for all oncologic services the patient needs.         [1] No Known Allergies

## 2025-04-15 NOTE — PROGRESS NOTES
Pt here for C6D1 Drug(s)Keytruda.  Arrives Ambulating independently, accompanied by Family member     Patient was evaluated today by MD.    Oral medications included in this regimen:  no    Patient confirms comprehension of cancer treatment schedule:  yes    Pregnancy screening:  Denies possibility of pregnancy    Modifications in dose or schedule:  No    Medications appearance and physical integrity checked by RN: yes.    Chemotherapy IV pump settings verified by 2 RNs:  No due to targeted therapy IV administration.  Frequency of blood return and site check throughout administration: Prior to administration and At completion of therapy     Infusion/treatment outcome:  patient tolerated treatment without incident    Education Record    Learner:  Patient and Family Member  Barriers / Limitations:  None  Method:  Discussion  Education / instructions given:  plan of care  Outcome:  Shows understanding    Discharged Home, Ambulating independently, accompanied by:Family member    Patient/family verbalized understanding of future appointments: by MyChart messaging

## 2025-04-22 ENCOUNTER — LAB ENCOUNTER (OUTPATIENT)
Dept: LAB | Age: 85
End: 2025-04-22
Attending: INTERNAL MEDICINE
Payer: MEDICARE

## 2025-04-22 DIAGNOSIS — C31.9 CANCER OF NASAL CAVITY AND SINUS (HCC): ICD-10-CM

## 2025-04-22 DIAGNOSIS — C30.0 CANCER OF NASAL CAVITY AND SINUS (HCC): ICD-10-CM

## 2025-04-22 DIAGNOSIS — N28.9 RENAL DYSFUNCTION: ICD-10-CM

## 2025-04-22 LAB
ANION GAP SERPL CALC-SCNC: 7 MMOL/L (ref 0–18)
BUN BLD-MCNC: 19 MG/DL (ref 9–23)
BUN/CREAT SERPL: 18.1 (ref 10–20)
CALCIUM BLD-MCNC: 9 MG/DL (ref 8.7–10.4)
CHLORIDE SERPL-SCNC: 105 MMOL/L (ref 98–112)
CO2 SERPL-SCNC: 27 MMOL/L (ref 21–32)
CREAT BLD-MCNC: 1.05 MG/DL (ref 0.55–1.02)
EGFRCR SERPLBLD CKD-EPI 2021: 52 ML/MIN/1.73M2 (ref 60–?)
FASTING STATUS PATIENT QL REPORTED: NO
GLUCOSE BLD-MCNC: 88 MG/DL (ref 70–99)
OSMOLALITY SERPL CALC.SUM OF ELEC: 290 MOSM/KG (ref 275–295)
POTASSIUM SERPL-SCNC: 3.9 MMOL/L (ref 3.5–5.1)
SODIUM SERPL-SCNC: 139 MMOL/L (ref 136–145)

## 2025-04-22 PROCEDURE — 80048 BASIC METABOLIC PNL TOTAL CA: CPT

## 2025-04-22 PROCEDURE — 36415 COLL VENOUS BLD VENIPUNCTURE: CPT

## 2025-05-05 ENCOUNTER — OFFICE VISIT (OUTPATIENT)
Dept: INTERNAL MEDICINE CLINIC | Facility: CLINIC | Age: 85
End: 2025-05-05

## 2025-05-05 VITALS
TEMPERATURE: 98 F | HEART RATE: 82 BPM | DIASTOLIC BLOOD PRESSURE: 80 MMHG | BODY MASS INDEX: 24.07 KG/M2 | HEIGHT: 56 IN | WEIGHT: 107 LBS | SYSTOLIC BLOOD PRESSURE: 130 MMHG

## 2025-05-05 DIAGNOSIS — I48.91 ATRIAL FIBRILLATION, UNSPECIFIED TYPE (HCC): ICD-10-CM

## 2025-05-05 DIAGNOSIS — N18.31 STAGE 3A CHRONIC KIDNEY DISEASE (HCC): Chronic | ICD-10-CM

## 2025-05-05 DIAGNOSIS — C30.0 CANCER OF NASAL CAVITY AND SINUS (HCC): ICD-10-CM

## 2025-05-05 DIAGNOSIS — C31.9 CANCER OF NASAL CAVITY AND SINUS (HCC): ICD-10-CM

## 2025-05-05 DIAGNOSIS — I10 ESSENTIAL HYPERTENSION: Primary | ICD-10-CM

## 2025-05-05 PROCEDURE — 3008F BODY MASS INDEX DOCD: CPT | Performed by: NURSE PRACTITIONER

## 2025-05-05 PROCEDURE — 3079F DIAST BP 80-89 MM HG: CPT | Performed by: NURSE PRACTITIONER

## 2025-05-05 PROCEDURE — 3075F SYST BP GE 130 - 139MM HG: CPT | Performed by: NURSE PRACTITIONER

## 2025-05-05 PROCEDURE — 1160F RVW MEDS BY RX/DR IN RCRD: CPT | Performed by: NURSE PRACTITIONER

## 2025-05-05 PROCEDURE — 1126F AMNT PAIN NOTED NONE PRSNT: CPT | Performed by: NURSE PRACTITIONER

## 2025-05-05 PROCEDURE — 99214 OFFICE O/P EST MOD 30 MIN: CPT | Performed by: NURSE PRACTITIONER

## 2025-05-05 PROCEDURE — 1159F MED LIST DOCD IN RCRD: CPT | Performed by: NURSE PRACTITIONER

## 2025-05-05 RX ORDER — FAMOTIDINE 20 MG/1
20 TABLET, FILM COATED ORAL
Qty: 180 TABLET | Refills: 3 | OUTPATIENT
Start: 2025-05-05

## 2025-05-05 NOTE — PROGRESS NOTES
Carmen Cox is a 85 year old female.  Chief Complaint   Patient presents with    Follow - Up     F/u HTN      HPI:   Patient presents for follow up. She is a patient of Dr Lewis. She was last seen on 11/2024.     She has a history of CKD, HTN, AFIB and nasal cavity and sinus cancer.     GFR on 4/15 was 40  Last GFR 52 on 4/22/2025  She was told to stopped the hydrochlorothiazide for one week due to having decrease kidney function. Her GFR was 40 then increased to 52.     CBC on 4/15/2025 showed HGB of 12.3    Cancer of nasal cavity and sinus. She is taking immunotherapy. The cancer is in remission. They are not able to remove the cancer. She has an MRI scheduled in June. She is following with Dr. Sow    She has arthritis in both hands. She states it is more in her right hand. She is wearing gloves on her hands at night which has helped with her symptoms.     She has RLS and takes gabapentin. She did see a nerve physician who prescribed gabapentin. She also had tingling on her scalp. Gabapentin does not help with this. She was told it could be related to her cancer.     Current Medications[1]   Past Medical History[2]   Past Surgical History[3]   Social History:  Short Social Hx on File[4]   Family History[5]   Allergies[6]     REVIEW OF SYSTEMS:     Review of Systems   Constitutional:  Negative for fever.   HENT: Negative.     Respiratory:  Negative for cough, shortness of breath and wheezing.    Cardiovascular:  Negative for chest pain.   Gastrointestinal:  Negative for abdominal pain.   Genitourinary: Negative.    Musculoskeletal:  Positive for arthralgias.   Skin: Negative.    Neurological: Negative.    Psychiatric/Behavioral: Negative.        Wt Readings from Last 5 Encounters:   05/05/25 107 lb (48.5 kg)   04/15/25 105 lb (47.6 kg)   03/25/25 102 lb 12.8 oz (46.6 kg)   03/04/25 104 lb 3.2 oz (47.3 kg)   02/11/25 105 lb 3.2 oz (47.7 kg)     Body mass index is 23.99 kg/m².      EXAM:   /80   Pulse 82    Temp 97.6 °F (36.4 °C) (Temporal)   Ht 4' 8\" (1.422 m)   Wt 107 lb (48.5 kg)   BMI 23.99 kg/m²     Physical Exam  Vitals reviewed.   Constitutional:       Appearance: Normal appearance.   HENT:      Head: Normocephalic.   Cardiovascular:      Rate and Rhythm: Normal rate and regular rhythm.      Pulses: Normal pulses.   Pulmonary:      Breath sounds: Normal breath sounds. No wheezing.   Musculoskeletal:         General: No swelling. Normal range of motion.   Skin:     General: Skin is warm and dry.   Neurological:      Mental Status: She is alert and oriented to person, place, and time.   Psychiatric:         Mood and Affect: Mood normal.         Behavior: Behavior normal.            ASSESSMENT AND PLAN:   1. Essential hypertension  - BP stable in office   - CPM    2. Stage 3a chronic kidney disease (HCC)  - recent GFR 52 stable  - per patient she does not drink enough water   - held hydrochlorothiazide x 1 week and GFR improved   - patient to discuss with cardiology about possibly discontinuing hydrochlorothiazide and starting another medication to help with blood pressure that wont affect the kidneys.     3. Atrial fibrillation, unspecified type (HCC)  - CPM  - following with cardiology  - continue ASA, xarelto and metoprolol     4. 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)  - following with hematology       The patient indicates understanding of these issues and agrees to the plan.  Return in about 3 months (around 8/5/2025) for for medicare physical with Dr Lewis .         [1]   Current Outpatient Medications   Medication Sig Dispense Refill    gabapentin 300 MG Oral Cap 1 pill in am and noon and 2 pills at bedtime 360 capsule 3    hydroCHLOROthiazide 25 MG Oral Tab       metoprolol tartrate 100 MG Oral Tab Take 1 tablet (100 mg total) by mouth in the morning and 1 tablet (100 mg total) before bedtime.      famotidine 20 MG Oral Tab Take 1 tablet (20 mg total) by mouth 2 (two) times daily before meals.  180 tablet 3    Cyanocobalamin (VITAMIN B-12) 2500 MCG Sublingual SL Tab Take 1  Tab under the tongue daily 90 tablet 2    polypropylene glycol- 0.4-0.3 % Ophthalmic Solution Place 1 drop into both eyes as needed (Dry eyes).      losartan 100 MG Oral Tab Take 1 tablet (100 mg total) by mouth daily. 90 tablet 3    rivaroxaban 15 MG Oral Tab Take 1 tablet (15 mg total) by mouth daily with food. 30 tablet 0    acetaminophen 500 MG Oral Tab Take 1 tablet (500 mg total) by mouth every 6 (six) hours as needed for Fever (equal to or greater than 100.4). 1 tablet 0    MELATONIN OR Take 1 tablet by mouth 5 days out of the week.  No weekends      Ascorbic Acid (VITAMIN C) 100 MG Oral Tab Take 1 tablet (100 mg total) by mouth in the morning.      Multiple Vitamins-Minerals (MULTIVITAMIN ADULTS 50+) Oral Tab Take 1 tablet by mouth in the morning.      Cholecalciferol (VITAMIN D3) 25 MCG (1000 UT) Oral Cap Take 1 tablet by mouth in the morning.      aspirin 81 MG Oral Tab Take 1 tablet (81 mg total) by mouth in the morning.      Na Sulfate-K Sulfate-Mg Sulf (SUPREP BOWEL PREP KIT) 17.5-3.13-1.6 GM/177ML Oral Solution Take as discussed in clinic (Patient not taking: Reported on 5/5/2025) 1 each 0   [2]   Past Medical History:   Afib (HCC)    Blurry vision, left eye    Cancer (HCC)    Decreased GFR    Essential hypertension    Heart palpitations    Hypertension    Lipid screening    Malaise and fatigue    Onychia of toe of left foot    Left great toe/Depbridement of at least 2/3 toenail    Skin lesions    Syncope and collapse   [3]   Past Surgical History:  Procedure Laterality Date    Appendectomy     [4]   Social History  Socioeconomic History    Marital status:    Tobacco Use    Smoking status: Never     Passive exposure: Never    Smokeless tobacco: Never   Vaping Use    Vaping status: Never Used   Substance and Sexual Activity    Alcohol use: No     Alcohol/week: 0.0 standard drinks of alcohol    Drug use: No    Other Topics Concern    Caffeine Concern Yes     Comment: 1 cup coffee daily    Pt has a pacemaker No    Pt has a defibrillator No    Breast feeding No    Reaction to local anesthetic No     Social Drivers of Health     Food Insecurity: No Food Insecurity (5/5/2025)    NCSS - Food Insecurity     Worried About Running Out of Food in the Last Year: No     Ran Out of Food in the Last Year: No   Transportation Needs: No Transportation Needs (5/5/2025)    NCSS - Transportation     Lack of Transportation: No   Housing Stability: Not At Risk (5/5/2025)    NCSS - Housing/Utilities     Has Housing: Yes     Worried About Losing Housing: No     Unable to Get Utilities: No   [5]   Family History  Problem Relation Age of Onset    Glaucoma Neg     Macular degeneration Neg     Diabetes Neg    [6] No Known Allergies

## 2025-05-06 ENCOUNTER — NURSE ONLY (OUTPATIENT)
Age: 85
End: 2025-05-06
Attending: INTERNAL MEDICINE
Payer: MEDICARE

## 2025-05-06 ENCOUNTER — OFFICE VISIT (OUTPATIENT)
Age: 85
End: 2025-05-06
Attending: INTERNAL MEDICINE
Payer: MEDICARE

## 2025-05-06 VITALS
HEIGHT: 56 IN | RESPIRATION RATE: 16 BRPM | SYSTOLIC BLOOD PRESSURE: 150 MMHG | HEART RATE: 71 BPM | BODY MASS INDEX: 23.84 KG/M2 | WEIGHT: 106 LBS | DIASTOLIC BLOOD PRESSURE: 70 MMHG | OXYGEN SATURATION: 100 % | TEMPERATURE: 98 F

## 2025-05-06 DIAGNOSIS — C30.0 CANCER OF NASAL CAVITY AND SINUS (HCC): Primary | ICD-10-CM

## 2025-05-06 DIAGNOSIS — C31.9 CANCER OF NASAL CAVITY AND SINUS (HCC): Primary | ICD-10-CM

## 2025-05-06 DIAGNOSIS — Z51.81 THERAPEUTIC DRUG MONITORING: ICD-10-CM

## 2025-05-06 DIAGNOSIS — Z51.12 ENCOUNTER FOR ANTINEOPLASTIC IMMUNOTHERAPY: ICD-10-CM

## 2025-05-06 LAB
ALBUMIN SERPL-MCNC: 4.1 G/DL (ref 3.2–4.8)
ALBUMIN/GLOB SERPL: 1.5 {RATIO} (ref 1–2)
ALP LIVER SERPL-CCNC: 113 U/L (ref 55–142)
ALT SERPL-CCNC: 22 U/L (ref 10–49)
ANION GAP SERPL CALC-SCNC: 8 MMOL/L (ref 0–18)
AST SERPL-CCNC: 35 U/L (ref ?–34)
BASOPHILS # BLD AUTO: 0.07 X10(3) UL (ref 0–0.2)
BASOPHILS NFR BLD AUTO: 1.3 %
BILIRUB SERPL-MCNC: 0.8 MG/DL (ref 0.2–1.1)
BUN BLD-MCNC: 15 MG/DL (ref 9–23)
BUN/CREAT SERPL: 14.3 (ref 10–20)
CALCIUM BLD-MCNC: 9.1 MG/DL (ref 8.7–10.4)
CHLORIDE SERPL-SCNC: 102 MMOL/L (ref 98–112)
CO2 SERPL-SCNC: 27 MMOL/L (ref 21–32)
CREAT BLD-MCNC: 1.05 MG/DL (ref 0.55–1.02)
DEPRECATED RDW RBC AUTO: 46.5 FL (ref 35.1–46.3)
EGFRCR SERPLBLD CKD-EPI 2021: 52 ML/MIN/1.73M2 (ref 60–?)
EOSINOPHIL # BLD AUTO: 0.08 X10(3) UL (ref 0–0.7)
EOSINOPHIL NFR BLD AUTO: 1.5 %
ERYTHROCYTE [DISTWIDTH] IN BLOOD BY AUTOMATED COUNT: 15.4 % (ref 11–15)
GLOBULIN PLAS-MCNC: 2.7 G/DL (ref 2–3.5)
GLUCOSE BLD-MCNC: 81 MG/DL (ref 70–99)
HCT VFR BLD AUTO: 37.1 % (ref 35–48)
HGB BLD-MCNC: 12 G/DL (ref 12–16)
IMM GRANULOCYTES # BLD AUTO: 0.01 X10(3) UL (ref 0–1)
IMM GRANULOCYTES NFR BLD: 0.2 %
LYMPHOCYTES # BLD AUTO: 0.87 X10(3) UL (ref 1–4)
LYMPHOCYTES NFR BLD AUTO: 16 %
MCH RBC QN AUTO: 26.6 PG (ref 26–34)
MCHC RBC AUTO-ENTMCNC: 32.3 G/DL (ref 31–37)
MCV RBC AUTO: 82.3 FL (ref 80–100)
MONOCYTES # BLD AUTO: 0.5 X10(3) UL (ref 0.1–1)
MONOCYTES NFR BLD AUTO: 9.2 %
NEUTROPHILS # BLD AUTO: 3.91 X10 (3) UL (ref 1.5–7.7)
NEUTROPHILS # BLD AUTO: 3.91 X10(3) UL (ref 1.5–7.7)
NEUTROPHILS NFR BLD AUTO: 71.8 %
OSMOLALITY SERPL CALC.SUM OF ELEC: 284 MOSM/KG (ref 275–295)
PLATELET # BLD AUTO: 284 10(3)UL (ref 150–450)
POTASSIUM SERPL-SCNC: 3.6 MMOL/L (ref 3.5–5.1)
PROT SERPL-MCNC: 6.8 G/DL (ref 5.7–8.2)
RBC # BLD AUTO: 4.51 X10(6)UL (ref 3.8–5.3)
SODIUM SERPL-SCNC: 137 MMOL/L (ref 136–145)
T4 FREE SERPL-MCNC: 1.2 NG/DL (ref 0.8–1.7)
TSI SER-ACNC: 1.53 UIU/ML (ref 0.55–4.78)
WBC # BLD AUTO: 5.4 X10(3) UL (ref 4–11)

## 2025-05-06 NOTE — PROGRESS NOTES
Pt here for C7D1 KEYTRUDA.  Arrives Ambulating independently, accompanied by Family member     Patient was evaluated today by MD.    Oral medications included in this regimen:  no    Patient confirms comprehension of cancer treatment schedule:  yes    Pregnancy screening:  Not applicable    Modifications in dose or schedule:  No    Medications appearance and physical integrity checked by RN: yes.    Chemotherapy IV pump settings verified by 2 RNs:  No due to targeted therapy IV administration.  Frequency of blood return and site check throughout administration: Prior to administration and At completion of therapy     Infusion/treatment outcome:  patient tolerated treatment without incident    Education Record    Learner:  Patient and Family Member  Barriers / Limitations:  None  Method:  Discussion  Education / instructions given:  PLAN OF CARE  Outcome:  Shows understanding    Discharged Home, Ambulating independently, accompanied by:Family member    Patient/family verbalized understanding of future appointments: by printed AVS

## 2025-05-06 NOTE — PROGRESS NOTES
[facility]ematology Oncology Group Office Note      Patient Name: Carmen Cox   YOB: 1940   Medical Record Number: [id number]   CSN: [id number]   Consulting Physician: Claudio Sow MD    Reason for Consultation:  locally advanced cancer of the nasopharynx/orbit.    Current Therapy  Pembrolizumab - C7D1 5/6/25    INTERVAL HISTORY  Patient returns for follow-up. Since the last visit She has done well.  She tolerated her last dose of pembrolizumab without any major issues.  Rare bouts of diarrhea.  no skin rash.  No recurrence of epistaxis.    Patient denies any nausea, vomiting, fevers, chills or pain. Pt also denies any bleeding, specifically hematuria, hematemesis or hemoptysis.    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  Her oncologic history is detailed below.    9/8/23 she initially presented with diplopia.  Workup included an MRI that showed left orbital mass with large caliber perineural invasion V1 and V2 traveling proximally through the superior orbital fissure and pterygopalatine fossa to the Meckel's cave as well as traveling to deuterated middle cranial fossa to the enterocolostomy 80s.  Clinically she had involvement of cranial nerves III V1 and V2.    She was seen by head and neck surgery and was not thought a surgical candidate.  She therefore underwent a biopsy on 10/17/2023.  Pathology from both the left orbital mass and left pterygoid fossa both showed poorly differentiated carcinoma with IHC favoring squamous differentiation.    She met with radiation oncology and was not thought a candidate for radiation therapy.  She was therefore recommended palliative systemic immunotherapy with pembrolizumab under the care of Dr. Danny Palmer    She was then initiated on pembrolizumab and has been on this treatment for the last year with an excellent  response.  Her recent MRI shows near complete resolution of tumor in the medial aspect left orbit.    Unfortunately due to insurance issues she is having to transfer her care to [facility] which is also much closer to her home.  She denies any significant side effects from her pembrolizumab therapy except for occasional intermittent diarrhea.     1/28/25 MRI of the face/sinus showed no significant change since prior MRI of October.  No residual or recurrent left orbital mass.  There is mild residual enhancement of the left orbital apex and the left forearm and rotundum that is unchanged.    Past Oncologic History  [id number], locally advanced, poorly differentiated carcinoma, favor squamous differentiation, L nasal cavity:  9/8/23 - locally advanced nasal cavity tumor per MRI (T4bNx)  10/17/23 - L orbital mass bx -> poorly differentiated carcinoma, favor squamous differentiation   12/8/23 - initiated pembrolizumab (200mg q3wks) with Dr. Danny Palmer at [place]  10/21/24 Most recent imaging was MRI Face which shows complete to near complete resolution of tumor in the medial aspect of the left orbit.  Tumor enhancement orbital apex appears mildly decreased and enhancement of the Meckel's cave and T2/foramen rotundum is unchanged      Past Medical History:  Past Medical History:    Afib (HCC)    Blurry vision, left eye    Cancer (HCC)    Decreased GFR    Essential hypertension    Heart palpitations    Hypertension    Lipid screening    Malaise and fatigue    Onychia of toe of left foot    Left great toe/Depbridement of at least 2/3 toenail    Skin lesions    Syncope and collapse       Past Surgical History:  Past Surgical History:   Procedure Laterality Date    Appendectomy         Family Medical History:  Family History   Problem Relation Age of Onset    Glaucoma Neg     Macular degeneration Neg     Diabetes Neg        Gyne History:  OB History   No obstetric history on file.       Social History:  Social History      Socioeconomic History    Marital status:      Spouse name: Not on file    Number of children: Not on file    Years of education: Not on file    Highest education level: Not on file   Occupational History    Not on file   Tobacco Use    Smoking status: Never     Passive exposure: Never    Smokeless tobacco: Never   Vaping Use    Vaping status: Never Used   Substance and Sexual Activity    Alcohol use: No     Alcohol/week: 0.0 standard drinks of alcohol    Drug use: No    Sexual activity: Not on file   Other Topics Concern     Service Not Asked    Blood Transfusions Not Asked    Caffeine Concern Yes     Comment: 1 cup coffee daily    Occupational Exposure Not Asked    Hobby Hazards Not Asked    Sleep Concern Not Asked    Stress Concern Not Asked    Weight Concern Not Asked    Special Diet Not Asked    Back Care Not Asked    Exercise Not Asked    Bike Helmet Not Asked    Seat Belt Not Asked    Self-Exams Not Asked    Grew up on a farm Not Asked    History of tanning Not Asked    Outdoor occupation Not Asked    Pt has a pacemaker No    Pt has a defibrillator No    Breast feeding No    Reaction to local anesthetic No   Social History Narrative    Not on file     Social Drivers of Health     Food Insecurity: No Food Insecurity (5/5/2025)    NCSS - Food Insecurity     Worried About Running Out of Food in the Last Year: No     Ran Out of Food in the Last Year: No   Transportation Needs: No Transportation Needs (5/5/2025)    NCSS - Transportation     Lack of Transportation: No   Housing Stability: Not At Risk (5/5/2025)    NCSS - Housing/Utilities     Has Housing: Yes     Worried About Losing Housing: No     Unable to Get Utilities: No       Allergies:   Allergies[1]    Current Medications:    Current Outpatient Medications:     gabapentin 300 MG Oral Cap, 1 pill in am and noon and 2 pills at bedtime, Disp: 360 capsule, Rfl: 3    hydroCHLOROthiazide 25 MG Oral Tab, , Disp: , Rfl:     metoprolol tartrate  100 MG Oral Tab, Take 1 tablet (100 mg total) by mouth in the morning and 1 tablet (100 mg total) before bedtime., Disp: , Rfl:     famotidine 20 MG Oral Tab, Take 1 tablet (20 mg total) by mouth 2 (two) times daily before meals., Disp: 180 tablet, Rfl: 3    Cyanocobalamin (VITAMIN B-12) 2500 MCG Sublingual SL Tab, Take 1  Tab under the tongue daily, Disp: 90 tablet, Rfl: 2    polypropylene glycol- 0.4-0.3 % Ophthalmic Solution, Place 1 drop into both eyes as needed (Dry eyes)., Disp: , Rfl:     losartan 100 MG Oral Tab, Take 1 tablet (100 mg total) by mouth daily., Disp: 90 tablet, Rfl: 3    rivaroxaban 15 MG Oral Tab, Take 1 tablet (15 mg total) by mouth daily with food., Disp: 30 tablet, Rfl: 0    acetaminophen 500 MG Oral Tab, Take 1 tablet (500 mg total) by mouth every 6 (six) hours as needed for Fever (equal to or greater than 100.4)., Disp: 1 tablet, Rfl: 0    MELATONIN OR, Take 1 tablet by mouth 5 days out of the week.  No weekends, Disp: , Rfl:     Ascorbic Acid (VITAMIN C) 100 MG Oral Tab, Take 1 tablet (100 mg total) by mouth in the morning., Disp: , Rfl:     Multiple Vitamins-Minerals (MULTIVITAMIN ADULTS 50+) Oral Tab, Take 1 tablet by mouth in the morning., Disp: , Rfl:     Cholecalciferol (VITAMIN D3) 25 MCG (1000 UT) Oral Cap, Take 1 tablet by mouth in the morning., Disp: , Rfl:     aspirin 81 MG Oral Tab, Take 1 tablet (81 mg total) by mouth in the morning., Disp: , Rfl:     Na Sulfate-K Sulfate-Mg Sulf (SUPREP BOWEL PREP KIT) 17.5-3.13-1.6 GM/177ML Oral Solution, Take as discussed in clinic (Patient not taking: No sig reported), Disp: 1 each, Rfl: 0      Review of Systems:  A comprehensive 14 point review of systems was completed.  Pertinent positives and negatives noted in the the HPI.     Vital Signs:  /70 (BP Location: Left arm, Patient Position: Sitting, Cuff Size: adult)   Pulse 71   Temp 98.2 °F (36.8 °C) (Oral)   Resp 16   Ht 1.422 m (4' 8\")   Wt 48.1 kg (106 lb)   SpO2  100%   BMI 23.76 kg/m²     Physical Examination:    General: Patient is alert and oriented x 3, not in acute distress.  Psych:  normal mood and affect  HEENT: EOMs intact.   Lymphatics: There is no palpable lymphadenopathy throughout in the cervical, supraclavicular or axillary regions.  Chest: Clear to auscultation. No wheezes or rales.  Heart: Regular rate and rhythm. S1S2 normal.  Extremities: No edema or calf tenderness.  Neurological: Grossly intact.     Performance Status:    ECOG-1    Labs:    Lab Results   Component Value Date/Time    WBC 5.4 05/06/2025 12:36 PM    RBC 4.51 05/06/2025 12:36 PM    HGB 12.0 05/06/2025 12:36 PM    HCT 37.1 05/06/2025 12:36 PM    MCV 82.3 05/06/2025 12:36 PM    MCH 26.6 05/06/2025 12:36 PM    MCHC 32.3 05/06/2025 12:36 PM    RDW 15.4 (H) 05/06/2025 12:36 PM    NEPRELIM 3.91 05/06/2025 12:36 PM    .0 05/06/2025 12:36 PM       Lab Results   Component Value Date/Time    GLU 81 05/06/2025 12:36 PM    BUN 15 05/06/2025 12:36 PM    CREATSERUM 1.05 (H) 05/06/2025 12:36 PM    GFRNAA 59 (L) 01/04/2022 11:15 AM    CA 9.1 05/06/2025 12:36 PM    ALB 4.1 05/06/2025 12:36 PM     05/06/2025 12:36 PM    K 3.6 05/06/2025 12:36 PM     05/06/2025 12:36 PM    CO2 27.0 05/06/2025 12:36 PM    ALKPHO 113 05/06/2025 12:36 PM    AST 35 (H) 05/06/2025 12:36 PM    ALT 22 05/06/2025 12:36 PM       Imaging:    Reviewed outside imaging    Impression:      ICD-10-CM    1. 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 Encounter for antineoplastic immunotherapy  Z51.12       3. 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  She had to transfer her care here from [place] due to insurance issues.    I had a long discussion with patient and her daughter and explained that  her immunotherapy treatment is palliative in intent and not curative.  However given the excellent response would expect she will continue to have good disease control.    Plan:  Continue pembrolizumab  Anticipated side effects of this therapy including autoimmune colitis dermatitis endocrinopathies etc. Were previously discussed.  MRI of the orbits/face and sinuses 2/25 shows no evidence of disease progression, repeat in June  She will return for follow-up prior to her next cycle of pembrolizumab..   Mildly elevated creatine: has improved    Emotional Well Being:    Emotional Well Being discussed, patient aware of support systems available through the Cancer Center. No acute issues.     The diagnosis, prognosis, treatment goals, plan for treatment, and expected response was explained to the patient.       Thank you Dr Subhash Lewis MD for the opportunity to participate in the care of this interesting patient. Please do contact me if I may be of any further assistance    Claudio Sow MD  [facility] Hematology/Oncology    High complexity MDM. Our clinic is continuing focal point for all oncologic services the patient needs.         [1] No Known Allergies

## 2025-05-27 ENCOUNTER — OFFICE VISIT (OUTPATIENT)
Age: 85
End: 2025-05-27
Attending: INTERNAL MEDICINE
Payer: MEDICARE

## 2025-05-27 ENCOUNTER — NURSE ONLY (OUTPATIENT)
Age: 85
End: 2025-05-27
Attending: INTERNAL MEDICINE
Payer: MEDICARE

## 2025-05-27 VITALS
DIASTOLIC BLOOD PRESSURE: 95 MMHG | WEIGHT: 103 LBS | OXYGEN SATURATION: 98 % | HEART RATE: 78 BPM | HEIGHT: 56 IN | BODY MASS INDEX: 23.17 KG/M2 | TEMPERATURE: 95 F | RESPIRATION RATE: 18 BRPM | SYSTOLIC BLOOD PRESSURE: 159 MMHG

## 2025-05-27 DIAGNOSIS — C31.9 CANCER OF NASAL CAVITY AND SINUS (HCC): Primary | ICD-10-CM

## 2025-05-27 DIAGNOSIS — C30.0 CANCER OF NASAL CAVITY AND SINUS (HCC): Primary | ICD-10-CM

## 2025-05-27 DIAGNOSIS — Z51.81 THERAPEUTIC DRUG MONITORING: ICD-10-CM

## 2025-05-27 DIAGNOSIS — H49.02 LEFT OCULOMOTOR NERVE PALSY: ICD-10-CM

## 2025-05-27 DIAGNOSIS — Z51.12 ENCOUNTER FOR ANTINEOPLASTIC IMMUNOTHERAPY: ICD-10-CM

## 2025-05-27 LAB
ALBUMIN SERPL-MCNC: 4.3 G/DL (ref 3.2–4.8)
ALBUMIN/GLOB SERPL: 1.7 {RATIO} (ref 1–2)
ALP LIVER SERPL-CCNC: 103 U/L (ref 55–142)
ALT SERPL-CCNC: 17 U/L (ref 10–49)
ANION GAP SERPL CALC-SCNC: 8 MMOL/L (ref 0–18)
AST SERPL-CCNC: 35 U/L (ref ?–34)
BASOPHILS # BLD AUTO: 0.04 X10(3) UL (ref 0–0.2)
BASOPHILS NFR BLD AUTO: 1.1 %
BILIRUB SERPL-MCNC: 0.7 MG/DL (ref 0.2–1.1)
BUN BLD-MCNC: 10 MG/DL (ref 9–23)
BUN/CREAT SERPL: 10.8 (ref 10–20)
CALCIUM BLD-MCNC: 9 MG/DL (ref 8.7–10.4)
CHLORIDE SERPL-SCNC: 100 MMOL/L (ref 98–112)
CO2 SERPL-SCNC: 28 MMOL/L (ref 21–32)
CREAT BLD-MCNC: 0.93 MG/DL (ref 0.55–1.02)
DEPRECATED RDW RBC AUTO: 43.9 FL (ref 35.1–46.3)
EGFRCR SERPLBLD CKD-EPI 2021: 60 ML/MIN/1.73M2 (ref 60–?)
EOSINOPHIL # BLD AUTO: 0.04 X10(3) UL (ref 0–0.7)
EOSINOPHIL NFR BLD AUTO: 1.1 %
ERYTHROCYTE [DISTWIDTH] IN BLOOD BY AUTOMATED COUNT: 14.4 % (ref 11–15)
GLOBULIN PLAS-MCNC: 2.6 G/DL (ref 2–3.5)
GLUCOSE BLD-MCNC: 88 MG/DL (ref 70–99)
HCT VFR BLD AUTO: 40.2 % (ref 35–48)
HGB BLD-MCNC: 12.8 G/DL (ref 12–16)
IMM GRANULOCYTES # BLD AUTO: 0.01 X10(3) UL (ref 0–1)
IMM GRANULOCYTES NFR BLD: 0.3 %
LYMPHOCYTES # BLD AUTO: 0.73 X10(3) UL (ref 1–4)
LYMPHOCYTES NFR BLD AUTO: 20.4 %
MCH RBC QN AUTO: 26.6 PG (ref 26–34)
MCHC RBC AUTO-ENTMCNC: 31.8 G/DL (ref 31–37)
MCV RBC AUTO: 83.4 FL (ref 80–100)
MONOCYTES # BLD AUTO: 0.28 X10(3) UL (ref 0.1–1)
MONOCYTES NFR BLD AUTO: 7.8 %
NEUTROPHILS # BLD AUTO: 2.48 X10 (3) UL (ref 1.5–7.7)
NEUTROPHILS # BLD AUTO: 2.48 X10(3) UL (ref 1.5–7.7)
NEUTROPHILS NFR BLD AUTO: 69.3 %
OSMOLALITY SERPL CALC.SUM OF ELEC: 280 MOSM/KG (ref 275–295)
PLATELET # BLD AUTO: 274 10(3)UL (ref 150–450)
POTASSIUM SERPL-SCNC: 3.7 MMOL/L (ref 3.5–5.1)
PROT SERPL-MCNC: 6.9 G/DL (ref 5.7–8.2)
RBC # BLD AUTO: 4.82 X10(6)UL (ref 3.8–5.3)
SODIUM SERPL-SCNC: 136 MMOL/L (ref 136–145)
WBC # BLD AUTO: 3.6 X10(3) UL (ref 4–11)

## 2025-05-27 NOTE — PROGRESS NOTES
Pt here for C8D1 Drug(s)Keytruda.  Arrives Ambulating independently, accompanied by Family member     Patient was evaluated today by MD.    Oral medications included in this regimen:  no    Patient confirms comprehension of cancer treatment schedule:  yes    Pregnancy screening:  Denies possibility of pregnancy    Modifications in dose or schedule:  No    Medications appearance and physical integrity checked by RN: yes.    Chemotherapy IV pump settings verified by 2 RNs:  No due to targeted therapy IV administration.  Frequency of blood return and site check throughout administration: Prior to administration and At completion of therapy     Infusion/treatment outcome:  patient tolerated treatment without incident    Education Record    Learner:  Patient and Family Member  Barriers / Limitations:  None  Method:  Discussion  Education / instructions given:  plan of care  Outcome:  Shows understanding    Discharged Home, Ambulating independently, accompanied by:Family member    Patient/family verbalized understanding of future appointments: by MyChart messaging

## 2025-05-27 NOTE — PROGRESS NOTES
[facility] Hematology Oncology Group Office Note      Patient Name: Carmen Cox   YOB: 1940   Medical Record Number: [id number]   CSN: [id number]   Consulting Physician: Claudio Sow MD    Diagnosis  1. 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 Encounter for antineoplastic immunotherapy    3. Therapeutic drug monitoring    4. Left oculomotor nerve palsy      Current Therapy  Pembrolizumab - C8D1 5/27/25    INTERVAL HISTORY  Patient returns for follow-up. Since the last visit She has done well.  She tolerated her last dose of pembrolizumab without any major issues.  Rare  diarrhea.  no skin rash.  No recurrence of epistaxis.    Patient denies any nausea, vomiting, fevers, chills or pain. Pt also denies any bleeding, specifically hematuria, hematemesis or hemoptysis.    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  Her oncologic history is detailed below.    9/8/23 she initially presented with diplopia.  Workup included an MRI that showed left orbital mass with large caliber perineural invasion V1 and V2 traveling proximally through the superior orbital fissure and pterygopalatine fossa to the Meckel's cave as well as traveling to deuterated middle cranial fossa to the enterocolostomy 80s.  Clinically she had involvement of cranial nerves III V1 and V2.    She was seen by head and neck surgery and was not thought a surgical candidate.  She therefore underwent a biopsy on 10/17/2023.  Pathology from both the left orbital mass and left pterygoid fossa both showed poorly differentiated carcinoma with IHC favoring squamous differentiation.    She met with radiation oncology and was not thought a candidate for radiation therapy.  She was therefore recommended palliative systemic immunotherapy with pembrolizumab under the care of Dr. Danny Palmer    She was then  initiated on pembrolizumab and has been on this treatment for the last year with an excellent response.  Her recent MRI shows near complete resolution of tumor in the medial aspect left orbit.    Unfortunately due to insurance issues she is having to transfer her care to [facility] which is also much closer to her home.  She denies any significant side effects from her pembrolizumab therapy except for occasional intermittent diarrhea.     1/28/25 MRI of the face/sinus showed no significant change since prior MRI of October.  No residual or recurrent left orbital mass.  There is mild residual enhancement of the left orbital apex and the left forearm and rotundum that is unchanged.    Past Oncologic History  [id number], locally advanced, poorly differentiated carcinoma, favor squamous differentiation, L nasal cavity:  9/8/23 - locally advanced nasal cavity tumor per MRI (T4bNx)  10/17/23 - L orbital mass bx -> poorly differentiated carcinoma, favor squamous differentiation   12/8/23 - initiated pembrolizumab (200mg q3wks) with Dr. Danny Palmer at [place]  10/21/24 Most recent imaging was MRI Face which shows complete to near complete resolution of tumor in the medial aspect of the left orbit.  Tumor enhancement orbital apex appears mildly decreased and enhancement of the Meckel's cave and T2/foramen rotundum is unchanged      Past Medical History:  Past Medical History:    Afib (HCC)    Blurry vision, left eye    Cancer (HCC)    Decreased GFR    Essential hypertension    Heart palpitations    Hypertension    Lipid screening    Malaise and fatigue    Onychia of toe of left foot    Left great toe/Depbridement of at least 2/3 toenail    Skin lesions    Syncope and collapse       Past Surgical History:  Past Surgical History:   Procedure Laterality Date    Appendectomy         Family Medical History:  Family History   Problem Relation Age of Onset    Glaucoma Neg     Macular degeneration Neg     Diabetes Neg         Gyne History:  OB History   No obstetric history on file.       Social History:  Social History     Socioeconomic History    Marital status:      Spouse name: Not on file    Number of children: Not on file    Years of education: Not on file    Highest education level: Not on file   Occupational History    Not on file   Tobacco Use    Smoking status: Never     Passive exposure: Never    Smokeless tobacco: Never   Vaping Use    Vaping status: Never Used   Substance and Sexual Activity    Alcohol use: No     Alcohol/week: 0.0 standard drinks of alcohol    Drug use: No    Sexual activity: Not on file   Other Topics Concern     Service Not Asked    Blood Transfusions Not Asked    Caffeine Concern Yes     Comment: 1 cup coffee daily    Occupational Exposure Not Asked    Hobby Hazards Not Asked    Sleep Concern Not Asked    Stress Concern Not Asked    Weight Concern Not Asked    Special Diet Not Asked    Back Care Not Asked    Exercise Not Asked    Bike Helmet Not Asked    Seat Belt Not Asked    Self-Exams Not Asked    Grew up on a farm Not Asked    History of tanning Not Asked    Outdoor occupation Not Asked    Pt has a pacemaker No    Pt has a defibrillator No    Breast feeding No    Reaction to local anesthetic No   Social History Narrative    Not on file     Social Drivers of Health     Food Insecurity: No Food Insecurity (5/5/2025)    NCSS - Food Insecurity     Worried About Running Out of Food in the Last Year: No     Ran Out of Food in the Last Year: No   Transportation Needs: No Transportation Needs (5/5/2025)    NCSS - Transportation     Lack of Transportation: No   Housing Stability: Not At Risk (5/5/2025)    NCSS - Housing/Utilities     Has Housing: Yes     Worried About Losing Housing: No     Unable to Get Utilities: No       Allergies:   Allergies[1]    Current Medications:    Current Outpatient Medications:     gabapentin 300 MG Oral Cap, 1 pill in am and noon and 2 pills at bedtime,  Disp: 360 capsule, Rfl: 3    hydroCHLOROthiazide 25 MG Oral Tab, , Disp: , Rfl:     metoprolol tartrate 100 MG Oral Tab, Take 1 tablet (100 mg total) by mouth in the morning and 1 tablet (100 mg total) before bedtime., Disp: , Rfl:     famotidine 20 MG Oral Tab, Take 1 tablet (20 mg total) by mouth 2 (two) times daily before meals., Disp: 180 tablet, Rfl: 3    Cyanocobalamin (VITAMIN B-12) 2500 MCG Sublingual SL Tab, Take 1  Tab under the tongue daily, Disp: 90 tablet, Rfl: 2    polypropylene glycol- 0.4-0.3 % Ophthalmic Solution, Place 1 drop into both eyes as needed (Dry eyes)., Disp: , Rfl:     losartan 100 MG Oral Tab, Take 1 tablet (100 mg total) by mouth daily., Disp: 90 tablet, Rfl: 3    rivaroxaban 15 MG Oral Tab, Take 1 tablet (15 mg total) by mouth daily with food., Disp: 30 tablet, Rfl: 0    acetaminophen 500 MG Oral Tab, Take 1 tablet (500 mg total) by mouth every 6 (six) hours as needed for Fever (equal to or greater than 100.4)., Disp: 1 tablet, Rfl: 0    MELATONIN OR, Take 1 tablet by mouth 5 days out of the week.  No weekends, Disp: , Rfl:     Ascorbic Acid (VITAMIN C) 100 MG Oral Tab, Take 1 tablet (100 mg total) by mouth in the morning., Disp: , Rfl:     Multiple Vitamins-Minerals (MULTIVITAMIN ADULTS 50+) Oral Tab, Take 1 tablet by mouth in the morning., Disp: , Rfl:     Cholecalciferol (VITAMIN D3) 25 MCG (1000 UT) Oral Cap, Take 1 tablet by mouth in the morning., Disp: , Rfl:     aspirin 81 MG Oral Tab, Take 1 tablet (81 mg total) by mouth in the morning., Disp: , Rfl:       Review of Systems:  A comprehensive 14 point review of systems was completed.  Pertinent positives and negatives noted in the the HPI.     Vital Signs:  BP (!) 159/95 (BP Location: Left arm, Patient Position: Sitting, Cuff Size: adult)   Pulse 78   Temp (!) 94.8 °F (34.9 °C) (Tympanic)   Resp 18   Ht 1.422 m (4' 8\")   Wt 46.7 kg (103 lb)   SpO2 98%   BMI 23.09 kg/m²     Physical Examination:    General: Patient  is alert and oriented x 3, not in acute distress.  Psych:  normal mood and affect  HEENT: EOMs intact.   Lymphatics: There is no palpable lymphadenopathy throughout in the cervical, supraclavicular or axillary regions.  Chest: Clear to auscultation. No wheezes or rales.  Heart: Regular rate and rhythm. S1S2 normal.  Extremities: No edema or calf tenderness.  Neurological: Grossly intact.     Performance Status:    ECOG-1    Labs:    Lab Results   Component Value Date/Time    WBC 3.6 (L) 05/27/2025 12:41 PM    RBC 4.82 05/27/2025 12:41 PM    HGB 12.8 05/27/2025 12:41 PM    HCT 40.2 05/27/2025 12:41 PM    MCV 83.4 05/27/2025 12:41 PM    MCH 26.6 05/27/2025 12:41 PM    MCHC 31.8 05/27/2025 12:41 PM    RDW 14.4 05/27/2025 12:41 PM    NEPRELIM 2.48 05/27/2025 12:41 PM    .0 05/27/2025 12:41 PM       Lab Results   Component Value Date/Time    GLU 88 05/27/2025 12:41 PM    BUN 10 05/27/2025 12:41 PM    CREATSERUM 0.93 05/27/2025 12:41 PM    GFRNAA 59 (L) 01/04/2022 11:15 AM    CA 9.0 05/27/2025 12:41 PM    ALB 4.3 05/27/2025 12:41 PM     05/27/2025 12:41 PM    K 3.7 05/27/2025 12:41 PM     05/27/2025 12:41 PM    CO2 28.0 05/27/2025 12:41 PM    ALKPHO 103 05/27/2025 12:41 PM    AST 35 (H) 05/27/2025 12:41 PM    ALT 17 05/27/2025 12:41 PM       Imaging:    Reviewed outside imaging    Impression:      ICD-10-CM    1. 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 Encounter for antineoplastic immunotherapy  Z51.12       3. Therapeutic drug monitoring  Z51.81       4. 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  She had to transfer her care here from [place] due to insurance issues.    I had a long discussion with patient and her daughter and explained that her immunotherapy treatment  is palliative in intent and not curative.  However given the excellent response would expect she will continue to have good disease control.    Plan:  Continue pembrolizumab  Anticipated side effects of this therapy including autoimmune colitis dermatitis endocrinopathies etc. Were previously discussed.  Repeat MRI of the orbits/face and sinuses in June   She will return for follow-up prior to her next cycle of pembrolizumab..   HTN: apparently normal recently with PCP. ?white coat HTN, recommend she monitor this at home    Emotional Well Being:    Emotional Well Being discussed, patient aware of support systems available through the Cancer Center. No acute issues.     The diagnosis, prognosis, treatment goals, plan for treatment, and expected response was explained to the patient.       Thank you Dr Subhash Lewis MD for the opportunity to participate in the care of this interesting patient. Please do contact me if I may be of any further assistance    Claudio Sow MD  [facility] Hematology/Oncology    High complexity MDM. Our clinic is continuing focal point for all oncologic services the patient needs.         [1] No Known Allergies

## 2025-06-11 ENCOUNTER — HOSPITAL ENCOUNTER (OUTPATIENT)
Dept: MRI IMAGING | Age: 85
Discharge: HOME | End: 2025-06-11
Attending: INTERNAL MEDICINE
Payer: MEDICARE

## 2025-06-11 DIAGNOSIS — C31.9 CANCER OF NASAL CAVITY AND SINUS (HCC): ICD-10-CM

## 2025-06-11 DIAGNOSIS — C30.0 CANCER OF NASAL CAVITY AND SINUS (HCC): ICD-10-CM

## 2025-06-11 DIAGNOSIS — Z51.12 ENCOUNTER FOR ANTINEOPLASTIC IMMUNOTHERAPY: ICD-10-CM

## 2025-06-11 PROCEDURE — 70543 MRI ORBT/FAC/NCK W/O &W/DYE: CPT | Performed by: INTERNAL MEDICINE

## 2025-06-11 PROCEDURE — A9575 INJ GADOTERATE MEGLUMI 0.1ML: HCPCS | Performed by: INTERNAL MEDICINE

## 2025-06-11 RX ORDER — DIPHENHYDRAMINE HYDROCHLORIDE 50 MG/ML
10 INJECTION, SOLUTION INTRAMUSCULAR; INTRAVENOUS
Status: COMPLETED | OUTPATIENT
Start: 2025-06-11 | End: 2025-06-11

## 2025-06-11 RX ADMIN — DIPHENHYDRAMINE HYDROCHLORIDE 9 ML: 50 INJECTION, SOLUTION INTRAMUSCULAR; INTRAVENOUS at 15:37:00

## 2025-06-15 ENCOUNTER — PATIENT MESSAGE (OUTPATIENT)
Dept: INTERNAL MEDICINE CLINIC | Facility: CLINIC | Age: 85
End: 2025-06-15

## 2025-06-15 DIAGNOSIS — C30.0 CANCER OF NASAL CAVITY AND SINUS (HCC): ICD-10-CM

## 2025-06-15 DIAGNOSIS — I10 ESSENTIAL HYPERTENSION: ICD-10-CM

## 2025-06-15 DIAGNOSIS — H05.89 ORBITAL MASS: ICD-10-CM

## 2025-06-15 DIAGNOSIS — C31.9 CANCER OF NASAL CAVITY AND SINUS (HCC): ICD-10-CM

## 2025-06-15 DIAGNOSIS — Z09 FOLLOW-UP EXAM: Primary | ICD-10-CM

## 2025-06-17 ENCOUNTER — OFFICE VISIT (OUTPATIENT)
Age: 85
End: 2025-06-17
Attending: INTERNAL MEDICINE
Payer: MEDICARE

## 2025-06-17 ENCOUNTER — NURSE ONLY (OUTPATIENT)
Age: 85
End: 2025-06-17
Attending: INTERNAL MEDICINE
Payer: MEDICARE

## 2025-06-17 VITALS
BODY MASS INDEX: 23 KG/M2 | RESPIRATION RATE: 18 BRPM | SYSTOLIC BLOOD PRESSURE: 125 MMHG | DIASTOLIC BLOOD PRESSURE: 82 MMHG | TEMPERATURE: 98 F | OXYGEN SATURATION: 98 % | WEIGHT: 104 LBS | HEART RATE: 83 BPM

## 2025-06-17 DIAGNOSIS — C30.0 CANCER OF NASAL CAVITY AND SINUS (HCC): Primary | ICD-10-CM

## 2025-06-17 DIAGNOSIS — C31.9 CANCER OF NASAL CAVITY AND SINUS (HCC): Primary | ICD-10-CM

## 2025-06-17 DIAGNOSIS — H49.02 LEFT OCULOMOTOR NERVE PALSY: ICD-10-CM

## 2025-06-17 DIAGNOSIS — J01.00 ACUTE NON-RECURRENT MAXILLARY SINUSITIS: ICD-10-CM

## 2025-06-17 DIAGNOSIS — Z51.12 ENCOUNTER FOR ANTINEOPLASTIC IMMUNOTHERAPY: ICD-10-CM

## 2025-06-17 LAB
ALBUMIN SERPL-MCNC: 4 G/DL (ref 3.2–4.8)
ALBUMIN/GLOB SERPL: 1.6 {RATIO} (ref 1–2)
ALP LIVER SERPL-CCNC: 95 U/L (ref 55–142)
ALT SERPL-CCNC: 16 U/L (ref 10–49)
ANION GAP SERPL CALC-SCNC: 9 MMOL/L (ref 0–18)
AST SERPL-CCNC: 28 U/L (ref ?–34)
BASOPHILS # BLD AUTO: 0.06 X10(3) UL (ref 0–0.2)
BASOPHILS NFR BLD AUTO: 1 %
BILIRUB SERPL-MCNC: 0.6 MG/DL (ref 0.2–1.1)
BUN BLD-MCNC: 20 MG/DL (ref 9–23)
BUN/CREAT SERPL: 17.7 (ref 10–20)
CALCIUM BLD-MCNC: 9.1 MG/DL (ref 8.7–10.4)
CHLORIDE SERPL-SCNC: 103 MMOL/L (ref 98–112)
CO2 SERPL-SCNC: 25 MMOL/L (ref 21–32)
CREAT BLD-MCNC: 1.13 MG/DL (ref 0.55–1.02)
DEPRECATED RDW RBC AUTO: 44.9 FL (ref 35.1–46.3)
EGFRCR SERPLBLD CKD-EPI 2021: 48 ML/MIN/1.73M2 (ref 60–?)
EOSINOPHIL # BLD AUTO: 0.15 X10(3) UL (ref 0–0.7)
EOSINOPHIL NFR BLD AUTO: 2.6 %
ERYTHROCYTE [DISTWIDTH] IN BLOOD BY AUTOMATED COUNT: 15.2 % (ref 11–15)
GLOBULIN PLAS-MCNC: 2.5 G/DL (ref 2–3.5)
GLUCOSE BLD-MCNC: 115 MG/DL (ref 70–99)
HCT VFR BLD AUTO: 36.4 % (ref 35–48)
HGB BLD-MCNC: 11.8 G/DL (ref 12–16)
IMM GRANULOCYTES # BLD AUTO: 0.02 X10(3) UL (ref 0–1)
IMM GRANULOCYTES NFR BLD: 0.3 %
LYMPHOCYTES # BLD AUTO: 0.85 X10(3) UL (ref 1–4)
LYMPHOCYTES NFR BLD AUTO: 14.5 %
MCH RBC QN AUTO: 26.6 PG (ref 26–34)
MCHC RBC AUTO-ENTMCNC: 32.4 G/DL (ref 31–37)
MCV RBC AUTO: 82 FL (ref 80–100)
MONOCYTES # BLD AUTO: 0.46 X10(3) UL (ref 0.1–1)
MONOCYTES NFR BLD AUTO: 7.8 %
NEUTROPHILS # BLD AUTO: 4.34 X10 (3) UL (ref 1.5–7.7)
NEUTROPHILS # BLD AUTO: 4.34 X10(3) UL (ref 1.5–7.7)
NEUTROPHILS NFR BLD AUTO: 73.8 %
OSMOLALITY SERPL CALC.SUM OF ELEC: 288 MOSM/KG (ref 275–295)
PLATELET # BLD AUTO: 397 10(3)UL (ref 150–450)
POTASSIUM SERPL-SCNC: 3.4 MMOL/L (ref 3.5–5.1)
PROT SERPL-MCNC: 6.5 G/DL (ref 5.7–8.2)
RBC # BLD AUTO: 4.44 X10(6)UL (ref 3.8–5.3)
SODIUM SERPL-SCNC: 137 MMOL/L (ref 136–145)
T4 FREE SERPL-MCNC: 1.1 NG/DL (ref 0.8–1.7)
TSI SER-ACNC: 1.46 UIU/ML (ref 0.55–4.78)
WBC # BLD AUTO: 5.9 X10(3) UL (ref 4–11)

## 2025-06-17 NOTE — PROGRESS NOTES
Pt here for C9D1 Drug(s)KEYTRUDA.  Arrives Ambulating independently, accompanied by Family member     Patient was evaluated today by MD and Treatment Nurse.    L FA PIV started in lab, good blood return noted.    Oral medications included in this regimen:  no    Patient confirms comprehension of cancer treatment schedule:  yes    Pregnancy screening:  Not applicable    Modifications in dose or schedule:  Yes,  is changing Keytruda from every 3 weeks to every 6 weeks, starting after C10. Made schedule for 7/8 in 3 weeks and then 8/19, 6 weeks from then.    Medications appearance and physical integrity checked by RN: yes.    Chemotherapy IV pump settings verified by 2 RNs:  No due to targeted therapy IV administration. Filter used.  Frequency of blood return and site check throughout administration: Prior to administration, Prior to each drug, and At completion of therapy     Infusion/treatment outcome:  patient tolerated treatment without incident    PIV removed.    Education Record    Learner:  Patient and Family Member  Barriers / Limitations:  None  Method:  Discussion and Printed material  Education / instructions given:  medication, plan of care, schedule  Outcome:  Shows understanding    Discharged Home, Ambulating independently, accompanied by:Family member    Patient/family verbalized understanding of future appointments: by printed AVS

## 2025-07-08 ENCOUNTER — OFFICE VISIT (OUTPATIENT)
Age: 85
End: 2025-07-08
Attending: INTERNAL MEDICINE
Payer: MEDICARE

## 2025-07-08 ENCOUNTER — APPOINTMENT (OUTPATIENT)
Facility: LOCATION | Age: 85
End: 2025-07-08
Attending: INTERNAL MEDICINE
Payer: MEDICARE

## 2025-07-08 ENCOUNTER — NURSE ONLY (OUTPATIENT)
Age: 85
End: 2025-07-08
Attending: INTERNAL MEDICINE
Payer: MEDICARE

## 2025-07-08 VITALS
SYSTOLIC BLOOD PRESSURE: 142 MMHG | DIASTOLIC BLOOD PRESSURE: 79 MMHG | WEIGHT: 107.38 LBS | OXYGEN SATURATION: 97 % | HEART RATE: 96 BPM | BODY MASS INDEX: 24 KG/M2 | TEMPERATURE: 98 F | RESPIRATION RATE: 18 BRPM

## 2025-07-08 DIAGNOSIS — C30.0 CANCER OF NASAL CAVITY AND SINUS (HCC): Primary | ICD-10-CM

## 2025-07-08 DIAGNOSIS — C31.9 CANCER OF NASAL CAVITY AND SINUS (HCC): Primary | ICD-10-CM

## 2025-07-08 DIAGNOSIS — Z51.12 ENCOUNTER FOR ANTINEOPLASTIC IMMUNOTHERAPY: ICD-10-CM

## 2025-07-08 LAB
ALBUMIN SERPL-MCNC: 4 G/DL (ref 3.2–4.8)
ALBUMIN/GLOB SERPL: 1.7 {RATIO} (ref 1–2)
ALP LIVER SERPL-CCNC: 90 U/L (ref 55–142)
ALT SERPL-CCNC: 16 U/L (ref 10–49)
ANION GAP SERPL CALC-SCNC: 5 MMOL/L (ref 0–18)
AST SERPL-CCNC: 29 U/L (ref ?–34)
BASOPHILS # BLD AUTO: 0.1 X10(3) UL (ref 0–0.2)
BASOPHILS NFR BLD AUTO: 1.8 %
BILIRUB SERPL-MCNC: 0.8 MG/DL (ref 0.2–1.1)
BUN BLD-MCNC: 15 MG/DL (ref 9–23)
BUN/CREAT SERPL: 12.2 (ref 10–20)
CALCIUM BLD-MCNC: 9.1 MG/DL (ref 8.7–10.4)
CHLORIDE SERPL-SCNC: 102 MMOL/L (ref 98–112)
CO2 SERPL-SCNC: 28 MMOL/L (ref 21–32)
CREAT BLD-MCNC: 1.23 MG/DL (ref 0.55–1.02)
DEPRECATED RDW RBC AUTO: 48.5 FL (ref 35.1–46.3)
EGFRCR SERPLBLD CKD-EPI 2021: 43 ML/MIN/1.73M2 (ref 60–?)
EOSINOPHIL # BLD AUTO: 0.13 X10(3) UL (ref 0–0.7)
EOSINOPHIL NFR BLD AUTO: 2.3 %
ERYTHROCYTE [DISTWIDTH] IN BLOOD BY AUTOMATED COUNT: 15.5 % (ref 11–15)
GLOBULIN PLAS-MCNC: 2.4 G/DL (ref 2–3.5)
GLUCOSE BLD-MCNC: 89 MG/DL (ref 70–99)
HCT VFR BLD AUTO: 34.4 % (ref 35–48)
HGB BLD-MCNC: 11.1 G/DL (ref 12–16)
IMM GRANULOCYTES # BLD AUTO: 0.01 X10(3) UL (ref 0–1)
IMM GRANULOCYTES NFR BLD: 0.2 %
LYMPHOCYTES # BLD AUTO: 0.89 X10(3) UL (ref 1–4)
LYMPHOCYTES NFR BLD AUTO: 15.9 %
MCH RBC QN AUTO: 27.4 PG (ref 26–34)
MCHC RBC AUTO-ENTMCNC: 32.3 G/DL (ref 31–37)
MCV RBC AUTO: 84.9 FL (ref 80–100)
MONOCYTES # BLD AUTO: 0.52 X10(3) UL (ref 0.1–1)
MONOCYTES NFR BLD AUTO: 9.3 %
NEUTROPHILS # BLD AUTO: 3.95 X10 (3) UL (ref 1.5–7.7)
NEUTROPHILS # BLD AUTO: 3.95 X10(3) UL (ref 1.5–7.7)
NEUTROPHILS NFR BLD AUTO: 70.5 %
OSMOLALITY SERPL CALC.SUM OF ELEC: 280 MOSM/KG (ref 275–295)
PLATELET # BLD AUTO: 267 10(3)UL (ref 150–450)
POTASSIUM SERPL-SCNC: 3.8 MMOL/L (ref 3.5–5.1)
PROT SERPL-MCNC: 6.4 G/DL (ref 5.7–8.2)
RBC # BLD AUTO: 4.05 X10(6)UL (ref 3.8–5.3)
SODIUM SERPL-SCNC: 135 MMOL/L (ref 136–145)
WBC # BLD AUTO: 5.6 X10(3) UL (ref 4–11)

## 2025-07-08 NOTE — PROGRESS NOTES
Pt here for C10 D1 Keytruda.  Arrives Ambulating independently, accompanied by Family member     Patient was evaluated today by SHANTE and Treatment Nurse.    Oral medications included in this regimen:  no    Patient confirms comprehension of cancer treatment schedule:  yes    Pregnancy screening:  Not applicable    Modifications in dose or schedule:  Yes, scheduling changed from q3wk to q6wk starting this cycle, 400mg given today.     Medications appearance and physical integrity checked by RN: yes.    Chemotherapy IV pump settings verified by 2 RNs:  No due to targeted therapy IV administration.  Frequency of blood return and site check throughout administration: Prior to administration and At completion of therapy     Infusion/treatment outcome:  patient tolerated treatment without incident    Education Record    Learner:  Patient and Family Member  Barriers / Limitations:  None  Method:  Discussion and Reinforcement  Education / instructions given:  Plan of care reviewed  Outcome:  Shows understanding    Discharged Home, Ambulating independently, accompanied by:Family member    Patient/family verbalized understanding of future appointments: by Cantimer messaging

## 2025-07-08 NOTE — PROGRESS NOTES
[facility] Office Note      Patient Name: Carmen Cox   YOB: 1940   Medical Record Number: [id number]   CSN: [id number]   Consulting Physician: Claudio Sow MD    Diagnosis  1. 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 Encounter for antineoplastic immunotherapy      Current Therapy  Pembrolizumab - C9D1 6/17/25    INTERVAL HISTORY  Here for follow up with her daughter. She continues to overall do well. She denies diarrhea, itching, rash, bleeding, pain, fevers, chills, n/v/d, changes in bowel or bladder habits. She feels tired by the end of the day but remains relatively active.    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  Her oncologic history is detailed below.    9/8/23 she initially presented with diplopia.  Workup included an MRI that showed left orbital mass with large caliber perineural invasion V1 and V2 traveling proximally through the superior orbital fissure and pterygopalatine fossa to the Meckel's cave as well as traveling to deuterated middle cranial fossa to the enterocolostomy 80s.  Clinically she had involvement of cranial nerves III V1 and V2.    She was seen by head and neck surgery and was not thought a surgical candidate.  She therefore underwent a biopsy on 10/17/2023.  Pathology from both the left orbital mass and left pterygoid fossa both showed poorly differentiated carcinoma with IHC favoring squamous differentiation.    She met with radiation oncology and was not thought a candidate for radiation therapy.  She was therefore recommended palliative systemic immunotherapy with pembrolizumab under the care of Dr. Danny Palmer    She was then initiated on pembrolizumab and has been on this treatment for the last year with an excellent response.  Her recent MRI shows near complete resolution of tumor in the  medial aspect left orbit.    Unfortunately due to insurance issues she is having to transfer her care to [facility] which is also much closer to her home.  She denies any significant side effects from her pembrolizumab therapy except for occasional intermittent diarrhea.     1/28/25 MRI of the face/sinus showed no significant change since prior MRI of October.  No residual or recurrent left orbital mass.  There is mild residual enhancement of the left orbital apex and the left forearm and rotundum that is unchanged.    Past Oncologic History  [id number], locally advanced, poorly differentiated carcinoma, favor squamous differentiation, L nasal cavity:  9/8/23 - locally advanced nasal cavity tumor per MRI ([id number])  10/17/23 - L orbital mass bx -> poorly differentiated carcinoma, favor squamous differentiation   12/8/23 - initiated pembrolizumab (200mg q3wks) with Dr. Danny Palmer at [place]  10/21/24 Most recent imaging was MRI Face which shows complete to near complete resolution of tumor in the medial aspect of the left orbit.  Tumor enhancement orbital apex appears mildly decreased and enhancement of the Meckel's cave and T2/foramen rotundum is unchanged      Past Medical History:  Past Medical History:    Afib (HCC)    Blurry vision, left eye    Cancer (HCC)    Decreased GFR    Essential hypertension    Heart palpitations    Hypertension    Lipid screening    Malaise and fatigue    Onychia of toe of left foot    Left great toe/Depbridement of at least 2/3 toenail    Skin lesions    Syncope and collapse       Past Surgical History:  Past Surgical History:   Procedure Laterality Date    Appendectomy         Family Medical History:  Family History   Problem Relation Age of Onset    Glaucoma Neg     Macular degeneration Neg     Diabetes Neg        Gyne History:  OB History   No obstetric history on file.       Social History:  Social History     Socioeconomic History    Marital status:      Spouse name: Not  on file    Number of children: Not on file    Years of education: Not on file    Highest education level: Not on file   Occupational History    Not on file   Tobacco Use    Smoking status: Never     Passive exposure: Never    Smokeless tobacco: Never   Vaping Use    Vaping status: Never Used   Substance and Sexual Activity    Alcohol use: No     Alcohol/week: 0.0 standard drinks of alcohol    Drug use: No    Sexual activity: Not on file   Other Topics Concern     Service Not Asked    Blood Transfusions Not Asked    Caffeine Concern Yes     Comment: 1 cup coffee daily    Occupational Exposure Not Asked    Hobby Hazards Not Asked    Sleep Concern Not Asked    Stress Concern Not Asked    Weight Concern Not Asked    Special Diet Not Asked    Back Care Not Asked    Exercise Not Asked    Bike Helmet Not Asked    Seat Belt Not Asked    Self-Exams Not Asked    Grew up on a farm Not Asked    History of tanning Not Asked    Outdoor occupation Not Asked    Pt has a pacemaker No    Pt has a defibrillator No    Breast feeding No    Reaction to local anesthetic No   Social History Narrative    Not on file     Social Drivers of Health     Food Insecurity: No Food Insecurity (5/5/2025)    NCSS - Food Insecurity     Worried About Running Out of Food in the Last Year: No     Ran Out of Food in the Last Year: No   Transportation Needs: No Transportation Needs (5/5/2025)    NCSS - Transportation     Lack of Transportation: No   Housing Stability: Not At Risk (5/5/2025)    NCSS - Housing/Utilities     Has Housing: Yes     Worried About Losing Housing: No     Unable to Get Utilities: No       Allergies:   Allergies[1]    Current Medications:    Current Outpatient Medications:     amoxicillin clavulanate 875-125 MG Oral Tab, Take 1 tablet by mouth 2 (two) times daily., Disp: 14 tablet, Rfl: 0    gabapentin 300 MG Oral Cap, 1 pill in am and noon and 2 pills at bedtime, Disp: 360 capsule, Rfl: 3    hydroCHLOROthiazide 25 MG Oral  Tab, , Disp: , Rfl:     metoprolol tartrate 100 MG Oral Tab, Take 1 tablet (100 mg total) by mouth in the morning and 1 tablet (100 mg total) before bedtime., Disp: , Rfl:     famotidine 20 MG Oral Tab, Take 1 tablet (20 mg total) by mouth 2 (two) times daily before meals., Disp: 180 tablet, Rfl: 3    Cyanocobalamin (VITAMIN B-12) 2500 MCG Sublingual SL Tab, Take 1  Tab under the tongue daily, Disp: 90 tablet, Rfl: 2    polypropylene glycol- 0.4-0.3 % Ophthalmic Solution, Place 1 drop into both eyes as needed (Dry eyes)., Disp: , Rfl:     losartan 100 MG Oral Tab, Take 1 tablet (100 mg total) by mouth daily., Disp: 90 tablet, Rfl: 3    rivaroxaban 15 MG Oral Tab, Take 1 tablet (15 mg total) by mouth daily with food., Disp: 30 tablet, Rfl: 0    acetaminophen 500 MG Oral Tab, Take 1 tablet (500 mg total) by mouth every 6 (six) hours as needed for Fever (equal to or greater than 100.4)., Disp: 1 tablet, Rfl: 0    MELATONIN OR, Take 1 tablet by mouth 5 days out of the week.  No weekends, Disp: , Rfl:     Ascorbic Acid (VITAMIN C) 100 MG Oral Tab, Take 1 tablet (100 mg total) by mouth in the morning., Disp: , Rfl:     Multiple Vitamins-Minerals (MULTIVITAMIN ADULTS 50+) Oral Tab, Take 1 tablet by mouth in the morning., Disp: , Rfl:     Cholecalciferol (VITAMIN D3) 25 MCG (1000 UT) Oral Cap, Take 1 tablet by mouth in the morning., Disp: , Rfl:     aspirin 81 MG Oral Tab, Take 1 tablet (81 mg total) by mouth in the morning., Disp: , Rfl:       Review of Systems:  A comprehensive 14 point review of systems was completed.  Pertinent positives and negatives noted in the the HPI.     Vital Signs:  /79 (BP Location: Left arm, Patient Position: Sitting, Cuff Size: adult)   Pulse 96   Temp 98 °F (36.7 °C) (Tympanic)   Resp 18   Wt 48.7 kg (107 lb 6.4 oz)   SpO2 97%   BMI 24.08 kg/m²     Physical Examination:    General: Patient is alert and oriented x 3, not in acute distress.  Psych:  normal mood and  affect  HEENT: EOMs intact.   Lymphatics: There is no palpable lymphadenopathy throughout in the cervical, supraclavicular or axillary regions.  Chest: Clear to auscultation. No wheezes or rales.  Heart: Regular rate and rhythm. S1S2 normal.  Extremities: No edema or calf tenderness.  Neurological: Grossly intact.     Performance Status:    ECOG-1    Labs:    Lab Results   Component Value Date/Time    WBC 5.6 07/08/2025 01:14 PM    RBC 4.05 07/08/2025 01:14 PM    HGB 11.1 (L) 07/08/2025 01:14 PM    HCT 34.4 (L) 07/08/2025 01:14 PM    MCV 84.9 07/08/2025 01:14 PM    MCH 27.4 07/08/2025 01:14 PM    MCHC 32.3 07/08/2025 01:14 PM    RDW 15.5 (H) 07/08/2025 01:14 PM    NEPRELIM 3.95 07/08/2025 01:14 PM    .0 07/08/2025 01:14 PM       Lab Results   Component Value Date/Time    GLU 89 07/08/2025 01:14 PM    BUN 15 07/08/2025 01:14 PM    CREATSERUM 1.23 (H) 07/08/2025 01:14 PM    GFRNAA 59 (L) 01/04/2022 11:15 AM    CA 9.1 07/08/2025 01:14 PM    ALB 4.0 07/08/2025 01:14 PM     (L) 07/08/2025 01:14 PM    K 3.8 07/08/2025 01:14 PM     07/08/2025 01:14 PM    CO2 28.0 07/08/2025 01:14 PM    ALKPHO 90 07/08/2025 01:14 PM    AST 29 07/08/2025 01:14 PM    ALT 16 07/08/2025 01:14 PM       Imaging:    Reviewed outside imaging    Impression:      ICD-10-CM    1. 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  She had to transfer her care here from [place] due to insurance issues.    I had a long discussion with patient and her daughter and explained that her immunotherapy treatment is palliative in intent and not curative.  However given the excellent response would expect she will continue to have good disease  control.    Plan:  Continue pembrolizumab, transition to q6 week dosing.  Anticipated side effects of this therapy including autoimmune colitis dermatitis endocrinopathies etc. were previously discussed.  MRI of the orbits/face and sinuses 6/11/25 showed cancer in remission.  Does have maxillary sinus thickening and given the purulent discharge she completed course of augmentin. Symptoms have resolved.  HTN: apparently normal recently with PCP. ?white coat HTN, recommend she monitor this at home, controlled today.  She/family are interesting in potentially stopping the pembrolizumab.  We discussed that patients with disease control would we recommend staying on this for up to 2 years as long as no side effects are noted.  She would prefer to have this less often so we will dose Pembro 400 mg every 6 weeks to minimize her visits. Follow up in 6 weeks for next cycle. Call prn in the interim.  Will plan subsequent MRI in 6 months if she remains asymptomatic    Emotional Well Being:    Emotional Well Being discussed, patient aware of support systems available through the Cancer Center. No acute issues.     The diagnosis, prognosis, treatment goals, plan for treatment, and expected response was explained to the patient.       Thank you Dr Subhash Lewis MD for the opportunity to participate in the care of this interesting patient. Please do contact me if I may be of any further assistance    CASSIUS Maharaj  [facility]    High complexity MDM. Our clinic is continuing focal point for all oncologic services the patient needs.         [1] No Known Allergies

## 2025-07-21 RX ORDER — FAMOTIDINE 20 MG/1
20 TABLET, FILM COATED ORAL
Qty: 180 TABLET | Refills: 3 | Status: SHIPPED | OUTPATIENT
Start: 2025-07-21

## 2025-07-21 NOTE — TELEPHONE ENCOUNTER
Refill Per Protocol     Requested Prescriptions   Pending Prescriptions Disp Refills    famotidine 20 MG Oral Tab 180 tablet 3     Sig: Take 1 tablet (20 mg total) by mouth 2 (two) times daily before meals.       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 SD- [id number]    In 4 weeks [facility] [place] PRE CHEMO VISIT    In 4 weeks [facility] INFRN 1 [facility] SD-[id number]    In 2 months Subhash Lewis MD [facility], [address], [place] Medicare physical. Weight loss.                    Passed - Medication is active on med list

## 2025-07-24 RX ORDER — FAMOTIDINE 20 MG/1
20 TABLET, FILM COATED ORAL
Qty: 30 TABLET | Refills: 0 | Status: SHIPPED | OUTPATIENT
Start: 2025-07-24

## 2025-07-29 ENCOUNTER — APPOINTMENT (OUTPATIENT)
Facility: LOCATION | Age: 85
End: 2025-07-29
Attending: INTERNAL MEDICINE
Payer: MEDICARE

## 2025-08-06 ENCOUNTER — TELEPHONE (OUTPATIENT)
Dept: CASE MANAGEMENT | Age: 85
End: 2025-08-06

## 2025-08-06 DIAGNOSIS — C31.9 CANCER OF NASAL CAVITY AND SINUS (HCC): Primary | ICD-10-CM

## 2025-08-06 DIAGNOSIS — H05.89 ORBITAL MASS: ICD-10-CM

## 2025-08-06 DIAGNOSIS — C30.0 CANCER OF NASAL CAVITY AND SINUS (HCC): Primary | ICD-10-CM

## 2025-08-14 ENCOUNTER — TELEPHONE (OUTPATIENT)
Facility: LOCATION | Age: 85
End: 2025-08-14

## 2025-08-19 ENCOUNTER — NURSE ONLY (OUTPATIENT)
Facility: LOCATION | Age: 85
End: 2025-08-19
Attending: INTERNAL MEDICINE

## 2025-08-19 ENCOUNTER — OFFICE VISIT (OUTPATIENT)
Facility: LOCATION | Age: 85
End: 2025-08-19
Attending: INTERNAL MEDICINE

## 2025-08-19 ENCOUNTER — APPOINTMENT (OUTPATIENT)
Facility: LOCATION | Age: 85
End: 2025-08-19
Attending: INTERNAL MEDICINE

## 2025-08-19 VITALS
WEIGHT: 108.5 LBS | RESPIRATION RATE: 18 BRPM | SYSTOLIC BLOOD PRESSURE: 147 MMHG | HEIGHT: 56 IN | TEMPERATURE: 98 F | OXYGEN SATURATION: 99 % | HEART RATE: 72 BPM | DIASTOLIC BLOOD PRESSURE: 91 MMHG | BODY MASS INDEX: 24.4 KG/M2

## 2025-08-19 DIAGNOSIS — C31.9 CANCER OF NASAL CAVITY AND SINUS (HCC): Primary | ICD-10-CM

## 2025-08-19 DIAGNOSIS — C30.0 CANCER OF NASAL CAVITY AND SINUS (HCC): Primary | ICD-10-CM

## 2025-08-19 DIAGNOSIS — H49.02 LEFT OCULOMOTOR NERVE PALSY: ICD-10-CM

## 2025-08-19 DIAGNOSIS — Z51.81 THERAPEUTIC DRUG MONITORING: ICD-10-CM

## 2025-08-19 DIAGNOSIS — Z51.12 ENCOUNTER FOR ANTINEOPLASTIC IMMUNOTHERAPY: ICD-10-CM

## 2025-08-19 LAB
ALBUMIN SERPL-MCNC: 4.2 G/DL (ref 3.2–4.8)
ALBUMIN/GLOB SERPL: 1.7 (ref 1–2)
ALP LIVER SERPL-CCNC: 96 U/L (ref 55–142)
ALT SERPL-CCNC: 17 U/L (ref 10–49)
ANION GAP SERPL CALC-SCNC: 4 MMOL/L (ref 0–18)
AST SERPL-CCNC: 29 U/L (ref ?–34)
BASOPHILS # BLD AUTO: 0.07 X10(3) UL (ref 0–0.2)
BASOPHILS NFR BLD AUTO: 1.6 %
BILIRUB SERPL-MCNC: 0.9 MG/DL (ref 0.2–1.1)
BUN BLD-MCNC: 14 MG/DL (ref 9–23)
BUN/CREAT SERPL: 14.1 (ref 10–20)
CALCIUM BLD-MCNC: 9.1 MG/DL (ref 8.7–10.4)
CHLORIDE SERPL-SCNC: 98 MMOL/L (ref 98–112)
CO2 SERPL-SCNC: 30 MMOL/L (ref 21–32)
CREAT BLD-MCNC: 0.99 MG/DL (ref 0.55–1.02)
DEPRECATED RDW RBC AUTO: 46.7 FL (ref 35.1–46.3)
EGFRCR SERPLBLD CKD-EPI 2021: 56 ML/MIN/1.73M2 (ref 60–?)
EOSINOPHIL # BLD AUTO: 0.09 X10(3) UL (ref 0–0.7)
EOSINOPHIL NFR BLD AUTO: 2 %
ERYTHROCYTE [DISTWIDTH] IN BLOOD BY AUTOMATED COUNT: 15.1 % (ref 11–15)
GLOBULIN PLAS-MCNC: 2.5 G/DL (ref 2–3.5)
GLUCOSE BLD-MCNC: 85 MG/DL (ref 70–99)
HCT VFR BLD AUTO: 37.5 % (ref 35–48)
HGB BLD-MCNC: 12.1 G/DL (ref 12–16)
IMM GRANULOCYTES # BLD AUTO: 0.01 X10(3) UL (ref 0–1)
IMM GRANULOCYTES NFR BLD: 0.2 %
LYMPHOCYTES # BLD AUTO: 0.77 X10(3) UL (ref 1–4)
LYMPHOCYTES NFR BLD AUTO: 17.3 %
MCH RBC QN AUTO: 27.3 PG (ref 26–34)
MCHC RBC AUTO-ENTMCNC: 32.3 G/DL (ref 31–37)
MCV RBC AUTO: 84.7 FL (ref 80–100)
MONOCYTES # BLD AUTO: 0.46 X10(3) UL (ref 0.1–1)
MONOCYTES NFR BLD AUTO: 10.3 %
NEUTROPHILS # BLD AUTO: 3.06 X10 (3) UL (ref 1.5–7.7)
NEUTROPHILS # BLD AUTO: 3.06 X10(3) UL (ref 1.5–7.7)
NEUTROPHILS NFR BLD AUTO: 68.6 %
OSMOLALITY SERPL CALC.SUM OF ELEC: 274 MOSM/KG (ref 275–295)
PLATELET # BLD AUTO: 278 10(3)UL (ref 150–450)
POTASSIUM SERPL-SCNC: 3.8 MMOL/L (ref 3.5–5.1)
PROT SERPL-MCNC: 6.7 G/DL (ref 5.7–8.2)
RBC # BLD AUTO: 4.43 X10(6)UL (ref 3.8–5.3)
SODIUM SERPL-SCNC: 132 MMOL/L (ref 136–145)
T4 FREE SERPL-MCNC: 1.1 NG/DL (ref 0.8–1.7)
TSI SER-ACNC: 1.72 UIU/ML (ref 0.55–4.78)
WBC # BLD AUTO: 4.5 X10(3) UL (ref 4–11)

## (undated) DIAGNOSIS — G25.81 RLS (RESTLESS LEGS SYNDROME): Primary | ICD-10-CM

## (undated) DIAGNOSIS — R20.2 PARESTHESIA: ICD-10-CM

## (undated) DIAGNOSIS — G50.0 SUPRAORBITAL NEURALGIA: ICD-10-CM

## (undated) DIAGNOSIS — I10 ESSENTIAL HYPERTENSION: ICD-10-CM

## (undated) NOTE — LETTER
12/6/2024          Carmen Cox    [address] [facility] [address]         Dear Carmen,    Our records indicate that the tests ordered for you by VENICE Tobias  have not been done.  If you have, in fact, already completed the tests or you do not wish to have the tests done, please contact our office at THE NUMBER LISTED BELOW.  Otherwise, please proceed with the testing.  Enclosed is a duplicate order for your convenience.    CT ABDOMEN+PELVIS(CONTRAST ONLY)(CPT=74177) (Order #[id number]) on 10/24/24   To schedule a test, call Central Scheduling at [phone]      Sincerely,    VENICE Tobias  [facility], [facility]  [address] [id number] [phone]

## (undated) NOTE — LETTER
07/10/19        Abe Cox  [address] [place] [address]      Dear Abe,    Our records indicate that you have outstanding lab work and or testing that was ordered for you and has not yet been completed:  Orders Placed This Encounter      Comp

## (undated) NOTE — LETTER
12/04/19        Yvonne Cox  [address] [place] [address]      Dear Yvonne Bourgeois,    Our records indicate that you have outstanding lab work and or testing that was ordered for you and has not yet been completed:  Orders Placed This Encounter      CBC

## (undated) NOTE — LETTER
AUTHORIZATION FOR SURGICAL OPERATION OR OTHER PROCEDURE    1. I hereby authorize Dr. Kenji Martinez and the [facility] staff assigned to my case to perform the following operation and/or procedure at the [facility]:    Occipital nerve block   _______________________________________________________________________________________________      _______________________________________________________________________________________________    2. My physician has explained the nature and purpose of the operation or other procedure, possible alternative methods of treatment, the risks involved, and the possibility of complication to me. I acknowledge that no guarantee has been made as to the result that may be obtained. 3.  I recognize that, during the course of this operation, or other procedure, unforseen conditions may necessitate additional or different procedure than those listed above. I, therefore, further authorize and request that the above named physician, his/her physician assistants or designees perform such procedures as are, in his/her professional opinion, necessary and desirable. 4.  Any tissue or organs removed in the operation or other procedure may be disposed of by and at the discretion of the [facility] staff and [facility]. 5.  I understand that in the event of a medical emergency, I will be transported by local paramedics to [facility] or other hospitals emergency department. 6.  I certify that I have read and fully understand the above consent to operation and/or other procedure. 7.  I acknowledge that my physician has explained sedation/analgesia administration to me including the risks and benefits. I consent to the administration of sedation/analgesia as may be necessary or desirable in the judgement of my physician. Witness signature: ___________________________________________________ Date:  ______/______/_____                    Time:  ________ A. M. P.M.       Patient Name:  ______________________________________________________  (please print)       Patient signature:  ___________________________________________________             Relationship to Patient:           []  Parent    Responsible person                          []  Spouse  In case of minor or                    [] Other  _____________   Incompetent name:  __________________________________________________                               (please print)      _____________      Responsible person  In case of minor or  Incompetent signature:  _______________________________________________    Statement of Physician  My signature below affirms that prior to the time of the procedure, I have explained to the patient and/or his/her guardian, the risks and benefits involved in the proposed treatment and any reasonable alternative to the proposed treatment. I have also explained the risks and benefits involved in the refusal of the proposed treatment and have answered the patient's questions.                         Date:  ______/______/_______  Provider                      Signature:  __________________________________________________________       Time:  ___________ A.M    P.M.

## (undated) NOTE — LETTER
January 3, 2022    Ruel Peralta MD  [facility]     Patient: Dc Felipe   YOB: 1940   Date of Visit: 1/3/2022       Dear Dr. Taylor Christensen MD:    Thank you for referring Dc Felipe to me for evaluation.  Here is Left great toe/Depbridement of at least 2/3 toenail       Surgical History: Danuta Vanegas has a past surgical history that includes appendectomy.     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 1/3/2022  Scribed by: Aggie Bongo Doreatha Prader, MD        If you have q

## (undated) NOTE — LETTER
01/08/18        Fidel Cox  [address]      Dear Fidel Jones,    [facility] records indicate that you have outstanding lab work and or testing that was ordered for you and has not yet been completed:          CBC W Differential W Platelet

## (undated) NOTE — LETTER
12/28/20        Cyril Cox  [address] [facility]      Dear Cyril Correa,    [facility] records indicate that you have outstanding lab work and or testing that was ordered for you and has not yet been completed:  Orders Placed This Encounter      Comp

## (undated) NOTE — LETTER
November 15, 2021    Angelic Price MD  [address] [place] [address]     Patient: Luis M Art   YOB: 1940   Date of Visit: 11/15/2021       Dear Dr. Stephanie Hicks MD:    Thank you for referring Luis M Art to me for evaluation.  Here Tab Take 1 tablet (20 mg total) by mouth 2 (two) times daily. 30 minutes before meals  In am an pm 60 tablet 2   • Lisinopril-hydroCHLOROthiazide 20-25 MG Oral Tab Take 1 tablet by mouth daily.  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 MRI showed microvascular ischemic changes. History of Hypertension and recent atrial fib. Hyperopia of both eyes with astigmatism  Continue same glasses which are new.  Fresnel prism placed on left [place]

## (undated) NOTE — MR AVS SNAPSHOT
[facility] SPECIALTY [facility] [place] [address] [place]  [id number] [phone]               Thank you for choosing us for your health care visit with Bruce Ward MD.  We are glad to serve you and happy to provide you with this summary o One to two four times a day as needed for muscle relaxation   Commonly known as:  ROBAXIN-750           * methocarbamol 750 MG Tabs   One to two four times a day as needed for muscle relaxation   Commonly known as:  ROBAXIN-750           Metoprolol Tartrat

## (undated) NOTE — MR AVS SNAPSHOT
[facility] SPECIALTY [facility] - [place] [address]  [id number] [phone]               Thank you for choosing us for your health care visit with Adryan Berry MD.  We are glad to serve you and happy to provide you with this summary o This list is accurate as of: 3/24/17 11:36 AM.  Always use your most recent med list.                AmLODIPine Besylate 5 MG Tabs   Take 1 tablet (5 mg total) by mouth daily.    Commonly known as:  NORVASC           aspirin 81 MG Tabs   Take 81 mg by mouth information, go to [email or web address] and click on the Sign Up Now link in the [employer] box. Enter your [employer] Activation Code exactly as it appears below along with your Zip Code and Date of Birth to complete the sign-up process.  If you do

## (undated) NOTE — LETTER
October 12, 2021    Jignesh Gomez MD  [facility]     Patient: Kendall Nichols   YOB: 1940   Date of Visit: 10/12/2021       Dear Dr. Beck Ennis MD:    Thank you for referring Kendall Nichols to me for evaluation.  Here Smoking status: Never Smoker      Smokeless tobacco: Never Used    Vaping Use      Vaping Use: Never used    Alcohol use: No      Alcohol/week: 0.0 standard drinks    Drug use: No      Medications:  Current Outpatient Medications   Medication Sig Dispense Additional Tests     Amsler       Right Left     \"double lines\" \"double lines\"   10/12/21             Slit Lamp and Fundus Exam     Slit Lamp Exam       Right Left    Lids/Lashes Dermatochalasis, Meibomian gland dysfunction Dermatochalasis, Meibomian types were placed in this encounter. Meds This Visit:  Requested Prescriptions      No prescriptions requested or ordered in this encounter        Follow up instructions:  Return for double vision with Dr. Jeanette Almanza and 1 year EE with Dr. Lalita Jacobson. Kelsey Reed     1

## (undated) NOTE — LETTER
AUTHORIZATION FOR SURGICAL OPERATION OR OTHER PROCEDURE    1. I hereby authorize Dr. Jinny Phalen and the [facility] staff assigned to my case to perform the following operation and/or procedure at the [facility]:    ______Nerve Block_______________________________________________________________________________      _______________________________________________________________________________________________    2. My physician has explained the nature and purpose of the operation or other procedure, possible alternative methods of treatment, the risks involved, and the possibility of complication to me. I acknowledge that no guarantee has been made as to the result that may be obtained. 3.  I recognize that, during the course of this operation, or other procedure, unforseen conditions may necessitate additional or different procedure than those listed above. I, therefore, further authorize and request that the above named physician, his/her physician assistants or designees perform such procedures as are, in his/her professional opinion, necessary and desirable. 4.  Any tissue or organs removed in the operation or other procedure may be disposed of by and at the discretion of the [facility] staff and [facility] AT [facility]. 5.  I understand that in the event of a medical emergency, I will be transported by local paramedics to [facility] or other hospital emergency department. 6.  I certify that I have read and fully understand the above consent to operation and/or other procedure. 7.  I acknowledge that my physician has explained sedation/analgesia administration to me including the risks and benefits. I consent to the administration of sedation/analgesia as may be necessary or desirable in the judgement of my physician. Witness signature: ___________________________________________________ Date:  ______/______/_____                    Time:  ________ A. M.  P.M.        Patient Name:  _______Tina Kelsey Dach _______________________________________________  (please print)       Patient signature:  ___________________________________________________             Relationship to Patient:           []  Parent    Responsible person                          []  Spouse  In case of minor or                    [] Other  _____________   Incompetent name:  __________________________________________________                               (please print)      _____________      Responsible person  In case of minor or  Incompetent signature:  _______________________________________________    Statement of Physician  My signature below affirms that prior to the time of the procedure, I have explained to the patient and/or his/her guardian, the risks and benefits involved in the proposed treatment and any reasonable alternative to the proposed treatment. I have also explained the risks and benefits involved in the refusal of the proposed treatment and have answered the patient's questions.                         Date:  ______/______/_______  Provider                      Signature:  __________________________________________________________       Time:  ___________ A.M    P.M.

## (undated) NOTE — LETTER
Date: May 3, 2023      Patient Name: Adria Fajardo      : 1940        Thank you for choosing [facility] as your health care provider. Your physician has deemed the following medical service(s) necessary. However, your insurance plan may not pay for all of your health care and costs and may deny payment for this service. The fact that your insurance plan does not pay for an item or service does not mean you should not receive it. The purpose of this form is to help you make an informed decision about whether or not you want to receive this service(s) that may not be paid for by your insurance plan. CPT Code Description     Cost     _________ ____Nerve Block________________  ___$400______      _________ ______________________________ _____________      _________ ______________________________ _____________      I understand that the above mentioned service(s) or supply may not be covered by my insurance company.  I agree to be financially responsible for the cost of this service or supply in the event of my insurance denies payment as a non-covered benefit.        ______________________________________________________________________  Signature of Patient or Patient's Representative  Relationship  Date    ______________________________________________________________________  Signature of Witness to signing of form   Printed Name

## (undated) NOTE — LETTER
09/20/18        Cuong Cox  [address] [facility]      Dear Cuong Gregorio,    [address] [facility] records indicate that you have outstanding lab work and or testing that was ordered for you and has not yet been completed:  Orders Placed This Encounter      CBC

## (undated) NOTE — LETTER
03/05/21        Carley Cox  [address] [facility]      Dear Carley Chacon,    [address] [facility] records indicate that you have outstanding lab work and or testing that was ordered for you and has not yet been completed:  Orders Placed This Encounter

## (undated) NOTE — MR AVS SNAPSHOT
[facility] - [place] [address] [address]  [id number] [phone]               Thank you for choosing us for your health care visit with Jorje Morillo MD.  We are glad to serve you and happy to provide you with this summary of * methocarbamol 750 MG Tabs   One to two four times a day as needed for muscle relaxation   Commonly known as:  ROBAXIN-750           Metoprolol Tartrate 50 MG Tabs   Take 1 tablet (50 mg total) by mouth daily.    Commonly known as:  LOPRESSOR